# Patient Record
Sex: FEMALE | Race: WHITE | NOT HISPANIC OR LATINO
[De-identification: names, ages, dates, MRNs, and addresses within clinical notes are randomized per-mention and may not be internally consistent; named-entity substitution may affect disease eponyms.]

---

## 2017-03-23 ENCOUNTER — APPOINTMENT (OUTPATIENT)
Dept: UROLOGY | Facility: CLINIC | Age: 82
End: 2017-03-23

## 2017-06-01 ENCOUNTER — OUTPATIENT (OUTPATIENT)
Dept: OUTPATIENT SERVICES | Facility: HOSPITAL | Age: 82
LOS: 1 days | Discharge: HOME | End: 2017-06-01

## 2017-06-01 DIAGNOSIS — K92.2 GASTROINTESTINAL HEMORRHAGE, UNSPECIFIED: ICD-10-CM

## 2017-06-01 DIAGNOSIS — M15.9 POLYOSTEOARTHRITIS, UNSPECIFIED: ICD-10-CM

## 2017-06-01 DIAGNOSIS — E86.0 DEHYDRATION: ICD-10-CM

## 2017-06-01 DIAGNOSIS — R00.2 PALPITATIONS: ICD-10-CM

## 2017-06-01 DIAGNOSIS — K57.30 DIVERTICULOSIS OF LARGE INTESTINE WITHOUT PERFORATION OR ABSCESS WITHOUT BLEEDING: ICD-10-CM

## 2017-06-01 DIAGNOSIS — H81.10 BENIGN PAROXYSMAL VERTIGO, UNSPECIFIED EAR: ICD-10-CM

## 2017-06-28 DIAGNOSIS — L97.821 NON-PRESSURE CHRONIC ULCER OF OTHER PART OF LEFT LOWER LEG LIMITED TO BREAKDOWN OF SKIN: ICD-10-CM

## 2017-09-28 ENCOUNTER — OUTPATIENT (OUTPATIENT)
Dept: OUTPATIENT SERVICES | Facility: HOSPITAL | Age: 82
LOS: 1 days | Discharge: HOME | End: 2017-09-28

## 2017-09-28 DIAGNOSIS — E64.9 SEQUELAE OF UNSPECIFIED NUTRITIONAL DEFICIENCY: ICD-10-CM

## 2017-09-28 DIAGNOSIS — E03.9 HYPOTHYROIDISM, UNSPECIFIED: ICD-10-CM

## 2017-09-28 DIAGNOSIS — E86.0 DEHYDRATION: ICD-10-CM

## 2017-09-28 DIAGNOSIS — R00.2 PALPITATIONS: ICD-10-CM

## 2017-09-28 DIAGNOSIS — K92.2 GASTROINTESTINAL HEMORRHAGE, UNSPECIFIED: ICD-10-CM

## 2017-09-28 DIAGNOSIS — M15.9 POLYOSTEOARTHRITIS, UNSPECIFIED: ICD-10-CM

## 2017-09-28 DIAGNOSIS — K57.30 DIVERTICULOSIS OF LARGE INTESTINE WITHOUT PERFORATION OR ABSCESS WITHOUT BLEEDING: ICD-10-CM

## 2017-09-28 DIAGNOSIS — D50.9 IRON DEFICIENCY ANEMIA, UNSPECIFIED: ICD-10-CM

## 2017-09-28 DIAGNOSIS — H81.10 BENIGN PAROXYSMAL VERTIGO, UNSPECIFIED EAR: ICD-10-CM

## 2017-09-29 ENCOUNTER — INPATIENT (INPATIENT)
Facility: HOSPITAL | Age: 82
LOS: 2 days | Discharge: HOME | End: 2017-10-02
Attending: INTERNAL MEDICINE | Admitting: INTERNAL MEDICINE

## 2017-09-29 DIAGNOSIS — K92.2 GASTROINTESTINAL HEMORRHAGE, UNSPECIFIED: ICD-10-CM

## 2017-09-29 DIAGNOSIS — E86.0 DEHYDRATION: ICD-10-CM

## 2017-09-29 DIAGNOSIS — R00.2 PALPITATIONS: ICD-10-CM

## 2017-09-29 DIAGNOSIS — K57.30 DIVERTICULOSIS OF LARGE INTESTINE WITHOUT PERFORATION OR ABSCESS WITHOUT BLEEDING: ICD-10-CM

## 2017-09-29 DIAGNOSIS — H81.10 BENIGN PAROXYSMAL VERTIGO, UNSPECIFIED EAR: ICD-10-CM

## 2017-09-29 DIAGNOSIS — M15.9 POLYOSTEOARTHRITIS, UNSPECIFIED: ICD-10-CM

## 2017-10-02 DIAGNOSIS — Z02.9 ENCOUNTER FOR ADMINISTRATIVE EXAMINATIONS, UNSPECIFIED: ICD-10-CM

## 2017-10-05 DIAGNOSIS — N18.9 CHRONIC KIDNEY DISEASE, UNSPECIFIED: ICD-10-CM

## 2017-10-05 DIAGNOSIS — R53.1 WEAKNESS: ICD-10-CM

## 2017-10-05 DIAGNOSIS — Z85.3 PERSONAL HISTORY OF MALIGNANT NEOPLASM OF BREAST: ICD-10-CM

## 2017-10-05 DIAGNOSIS — N17.9 ACUTE KIDNEY FAILURE, UNSPECIFIED: ICD-10-CM

## 2017-10-05 DIAGNOSIS — I25.10 ATHEROSCLEROTIC HEART DISEASE OF NATIVE CORONARY ARTERY WITHOUT ANGINA PECTORIS: ICD-10-CM

## 2017-10-05 DIAGNOSIS — Z90.2 ACQUIRED ABSENCE OF LUNG [PART OF]: ICD-10-CM

## 2017-10-05 DIAGNOSIS — E87.5 HYPERKALEMIA: ICD-10-CM

## 2017-10-05 DIAGNOSIS — Z85.43 PERSONAL HISTORY OF MALIGNANT NEOPLASM OF OVARY: ICD-10-CM

## 2017-10-05 DIAGNOSIS — Z96.652 PRESENCE OF LEFT ARTIFICIAL KNEE JOINT: ICD-10-CM

## 2017-10-05 DIAGNOSIS — Z85.118 PERSONAL HISTORY OF OTHER MALIGNANT NEOPLASM OF BRONCHUS AND LUNG: ICD-10-CM

## 2017-10-05 DIAGNOSIS — Z96.641 PRESENCE OF RIGHT ARTIFICIAL HIP JOINT: ICD-10-CM

## 2017-10-05 DIAGNOSIS — Z85.528 PERSONAL HISTORY OF OTHER MALIGNANT NEOPLASM OF KIDNEY: ICD-10-CM

## 2017-10-05 DIAGNOSIS — Z90.5 ACQUIRED ABSENCE OF KIDNEY: ICD-10-CM

## 2017-10-05 DIAGNOSIS — Z87.891 PERSONAL HISTORY OF NICOTINE DEPENDENCE: ICD-10-CM

## 2017-10-05 DIAGNOSIS — J44.9 CHRONIC OBSTRUCTIVE PULMONARY DISEASE, UNSPECIFIED: ICD-10-CM

## 2017-10-05 DIAGNOSIS — E03.9 HYPOTHYROIDISM, UNSPECIFIED: ICD-10-CM

## 2017-10-05 DIAGNOSIS — R62.7 ADULT FAILURE TO THRIVE: ICD-10-CM

## 2017-10-05 DIAGNOSIS — E86.0 DEHYDRATION: ICD-10-CM

## 2017-10-23 ENCOUNTER — INPATIENT (INPATIENT)
Facility: HOSPITAL | Age: 82
LOS: 1 days | Discharge: HOME | End: 2017-10-25
Attending: HOSPITALIST | Admitting: INTERNAL MEDICINE

## 2017-10-23 DIAGNOSIS — R00.2 PALPITATIONS: ICD-10-CM

## 2017-10-23 DIAGNOSIS — K92.2 GASTROINTESTINAL HEMORRHAGE, UNSPECIFIED: ICD-10-CM

## 2017-10-23 DIAGNOSIS — M15.9 POLYOSTEOARTHRITIS, UNSPECIFIED: ICD-10-CM

## 2017-10-23 DIAGNOSIS — E86.0 DEHYDRATION: ICD-10-CM

## 2017-10-23 DIAGNOSIS — K57.30 DIVERTICULOSIS OF LARGE INTESTINE WITHOUT PERFORATION OR ABSCESS WITHOUT BLEEDING: ICD-10-CM

## 2017-10-23 DIAGNOSIS — H81.10 BENIGN PAROXYSMAL VERTIGO, UNSPECIFIED EAR: ICD-10-CM

## 2017-11-06 DIAGNOSIS — Z87.891 PERSONAL HISTORY OF NICOTINE DEPENDENCE: ICD-10-CM

## 2017-11-06 DIAGNOSIS — Z90.2 ACQUIRED ABSENCE OF LUNG [PART OF]: ICD-10-CM

## 2017-11-06 DIAGNOSIS — N17.9 ACUTE KIDNEY FAILURE, UNSPECIFIED: ICD-10-CM

## 2017-11-06 DIAGNOSIS — Z85.3 PERSONAL HISTORY OF MALIGNANT NEOPLASM OF BREAST: ICD-10-CM

## 2017-11-06 DIAGNOSIS — I25.10 ATHEROSCLEROTIC HEART DISEASE OF NATIVE CORONARY ARTERY WITHOUT ANGINA PECTORIS: ICD-10-CM

## 2017-11-06 DIAGNOSIS — Z96.652 PRESENCE OF LEFT ARTIFICIAL KNEE JOINT: ICD-10-CM

## 2017-11-06 DIAGNOSIS — Z90.5 ACQUIRED ABSENCE OF KIDNEY: ICD-10-CM

## 2017-11-06 DIAGNOSIS — Z96.641 PRESENCE OF RIGHT ARTIFICIAL HIP JOINT: ICD-10-CM

## 2017-11-06 DIAGNOSIS — I12.9 HYPERTENSIVE CHRONIC KIDNEY DISEASE WITH STAGE 1 THROUGH STAGE 4 CHRONIC KIDNEY DISEASE, OR UNSPECIFIED CHRONIC KIDNEY DISEASE: ICD-10-CM

## 2017-11-06 DIAGNOSIS — R00.2 PALPITATIONS: ICD-10-CM

## 2017-11-06 DIAGNOSIS — E03.9 HYPOTHYROIDISM, UNSPECIFIED: ICD-10-CM

## 2017-11-06 DIAGNOSIS — N18.9 CHRONIC KIDNEY DISEASE, UNSPECIFIED: ICD-10-CM

## 2017-11-06 DIAGNOSIS — J44.9 CHRONIC OBSTRUCTIVE PULMONARY DISEASE, UNSPECIFIED: ICD-10-CM

## 2017-11-06 DIAGNOSIS — T50.905A ADVERSE EFFECT OF UNSPECIFIED DRUGS, MEDICAMENTS AND BIOLOGICAL SUBSTANCES, INITIAL ENCOUNTER: ICD-10-CM

## 2017-11-06 DIAGNOSIS — Z85.528 PERSONAL HISTORY OF OTHER MALIGNANT NEOPLASM OF KIDNEY: ICD-10-CM

## 2017-11-06 DIAGNOSIS — Z85.118 PERSONAL HISTORY OF OTHER MALIGNANT NEOPLASM OF BRONCHUS AND LUNG: ICD-10-CM

## 2017-11-06 DIAGNOSIS — N39.0 URINARY TRACT INFECTION, SITE NOT SPECIFIED: ICD-10-CM

## 2017-11-06 DIAGNOSIS — E83.52 HYPERCALCEMIA: ICD-10-CM

## 2017-11-07 ENCOUNTER — OUTPATIENT (OUTPATIENT)
Dept: OUTPATIENT SERVICES | Facility: HOSPITAL | Age: 82
LOS: 1 days | Discharge: HOME | End: 2017-11-07

## 2017-11-07 DIAGNOSIS — K92.2 GASTROINTESTINAL HEMORRHAGE, UNSPECIFIED: ICD-10-CM

## 2017-11-07 DIAGNOSIS — R00.2 PALPITATIONS: ICD-10-CM

## 2017-11-07 DIAGNOSIS — R06.02 SHORTNESS OF BREATH: ICD-10-CM

## 2017-11-07 DIAGNOSIS — H81.10 BENIGN PAROXYSMAL VERTIGO, UNSPECIFIED EAR: ICD-10-CM

## 2017-11-07 DIAGNOSIS — M15.9 POLYOSTEOARTHRITIS, UNSPECIFIED: ICD-10-CM

## 2017-11-07 DIAGNOSIS — E86.0 DEHYDRATION: ICD-10-CM

## 2017-11-07 DIAGNOSIS — I10 ESSENTIAL (PRIMARY) HYPERTENSION: ICD-10-CM

## 2017-11-07 DIAGNOSIS — I35.0 NONRHEUMATIC AORTIC (VALVE) STENOSIS: ICD-10-CM

## 2017-11-07 DIAGNOSIS — K57.30 DIVERTICULOSIS OF LARGE INTESTINE WITHOUT PERFORATION OR ABSCESS WITHOUT BLEEDING: ICD-10-CM

## 2017-12-12 ENCOUNTER — EMERGENCY (EMERGENCY)
Facility: HOSPITAL | Age: 82
LOS: 0 days | Discharge: HOME | End: 2017-12-13
Admitting: INTERNAL MEDICINE

## 2017-12-12 ENCOUNTER — INPATIENT (INPATIENT)
Facility: HOSPITAL | Age: 82
LOS: 0 days | Discharge: HOME | End: 2017-12-13
Attending: INTERNAL MEDICINE | Admitting: INTERNAL MEDICINE

## 2017-12-12 DIAGNOSIS — E86.0 DEHYDRATION: ICD-10-CM

## 2017-12-12 DIAGNOSIS — I10 ESSENTIAL (PRIMARY) HYPERTENSION: ICD-10-CM

## 2017-12-12 DIAGNOSIS — R00.2 PALPITATIONS: ICD-10-CM

## 2017-12-12 DIAGNOSIS — M15.9 POLYOSTEOARTHRITIS, UNSPECIFIED: ICD-10-CM

## 2017-12-12 DIAGNOSIS — H81.10 BENIGN PAROXYSMAL VERTIGO, UNSPECIFIED EAR: ICD-10-CM

## 2017-12-12 DIAGNOSIS — R53.1 WEAKNESS: ICD-10-CM

## 2017-12-12 DIAGNOSIS — R11.0 NAUSEA: ICD-10-CM

## 2017-12-12 DIAGNOSIS — R42 DIZZINESS AND GIDDINESS: ICD-10-CM

## 2017-12-12 DIAGNOSIS — K92.2 GASTROINTESTINAL HEMORRHAGE, UNSPECIFIED: ICD-10-CM

## 2017-12-12 DIAGNOSIS — K57.30 DIVERTICULOSIS OF LARGE INTESTINE WITHOUT PERFORATION OR ABSCESS WITHOUT BLEEDING: ICD-10-CM

## 2017-12-12 DIAGNOSIS — R51 HEADACHE: ICD-10-CM

## 2017-12-27 DIAGNOSIS — Z85.528 PERSONAL HISTORY OF OTHER MALIGNANT NEOPLASM OF KIDNEY: ICD-10-CM

## 2017-12-27 DIAGNOSIS — I12.9 HYPERTENSIVE CHRONIC KIDNEY DISEASE WITH STAGE 1 THROUGH STAGE 4 CHRONIC KIDNEY DISEASE, OR UNSPECIFIED CHRONIC KIDNEY DISEASE: ICD-10-CM

## 2017-12-27 DIAGNOSIS — I25.10 ATHEROSCLEROTIC HEART DISEASE OF NATIVE CORONARY ARTERY WITHOUT ANGINA PECTORIS: ICD-10-CM

## 2017-12-27 DIAGNOSIS — Z90.5 ACQUIRED ABSENCE OF KIDNEY: ICD-10-CM

## 2017-12-27 DIAGNOSIS — N18.3 CHRONIC KIDNEY DISEASE, STAGE 3 (MODERATE): ICD-10-CM

## 2017-12-27 DIAGNOSIS — Z90.2 ACQUIRED ABSENCE OF LUNG [PART OF]: ICD-10-CM

## 2017-12-27 DIAGNOSIS — E87.1 HYPO-OSMOLALITY AND HYPONATREMIA: ICD-10-CM

## 2017-12-27 DIAGNOSIS — Z87.891 PERSONAL HISTORY OF NICOTINE DEPENDENCE: ICD-10-CM

## 2017-12-27 DIAGNOSIS — Z96.641 PRESENCE OF RIGHT ARTIFICIAL HIP JOINT: ICD-10-CM

## 2017-12-27 DIAGNOSIS — Z79.52 LONG TERM (CURRENT) USE OF SYSTEMIC STEROIDS: ICD-10-CM

## 2017-12-27 DIAGNOSIS — Z85.3 PERSONAL HISTORY OF MALIGNANT NEOPLASM OF BREAST: ICD-10-CM

## 2017-12-27 DIAGNOSIS — Z96.652 PRESENCE OF LEFT ARTIFICIAL KNEE JOINT: ICD-10-CM

## 2017-12-27 DIAGNOSIS — Z90.49 ACQUIRED ABSENCE OF OTHER SPECIFIED PARTS OF DIGESTIVE TRACT: ICD-10-CM

## 2017-12-27 DIAGNOSIS — J44.9 CHRONIC OBSTRUCTIVE PULMONARY DISEASE, UNSPECIFIED: ICD-10-CM

## 2017-12-27 DIAGNOSIS — H81.10 BENIGN PAROXYSMAL VERTIGO, UNSPECIFIED EAR: ICD-10-CM

## 2017-12-27 DIAGNOSIS — Z85.118 PERSONAL HISTORY OF OTHER MALIGNANT NEOPLASM OF BRONCHUS AND LUNG: ICD-10-CM

## 2017-12-27 DIAGNOSIS — R42 DIZZINESS AND GIDDINESS: ICD-10-CM

## 2017-12-27 DIAGNOSIS — E03.9 HYPOTHYROIDISM, UNSPECIFIED: ICD-10-CM

## 2017-12-27 DIAGNOSIS — Z91.041 RADIOGRAPHIC DYE ALLERGY STATUS: ICD-10-CM

## 2018-02-01 ENCOUNTER — APPOINTMENT (OUTPATIENT)
Dept: UROLOGY | Facility: CLINIC | Age: 83
End: 2018-02-01

## 2018-02-13 ENCOUNTER — OUTPATIENT (OUTPATIENT)
Dept: OUTPATIENT SERVICES | Facility: HOSPITAL | Age: 83
LOS: 1 days | Discharge: HOME | End: 2018-02-13

## 2018-02-13 DIAGNOSIS — I10 ESSENTIAL (PRIMARY) HYPERTENSION: ICD-10-CM

## 2018-02-13 DIAGNOSIS — I34.0 NONRHEUMATIC MITRAL (VALVE) INSUFFICIENCY: ICD-10-CM

## 2018-02-13 DIAGNOSIS — I51.7 CARDIOMEGALY: ICD-10-CM

## 2018-02-13 DIAGNOSIS — I35.2 NONRHEUMATIC AORTIC (VALVE) STENOSIS WITH INSUFFICIENCY: ICD-10-CM

## 2018-03-22 ENCOUNTER — APPOINTMENT (OUTPATIENT)
Dept: UROLOGY | Facility: CLINIC | Age: 83
End: 2018-03-22

## 2018-03-27 ENCOUNTER — OUTPATIENT (OUTPATIENT)
Dept: OUTPATIENT SERVICES | Facility: HOSPITAL | Age: 83
LOS: 1 days | Discharge: HOME | End: 2018-03-27

## 2018-03-27 DIAGNOSIS — R07.89 OTHER CHEST PAIN: ICD-10-CM

## 2018-03-27 DIAGNOSIS — R06.02 SHORTNESS OF BREATH: ICD-10-CM

## 2018-03-27 DIAGNOSIS — I73.9 PERIPHERAL VASCULAR DISEASE, UNSPECIFIED: ICD-10-CM

## 2018-03-27 DIAGNOSIS — I10 ESSENTIAL (PRIMARY) HYPERTENSION: ICD-10-CM

## 2018-03-27 DIAGNOSIS — R42 DIZZINESS AND GIDDINESS: ICD-10-CM

## 2018-03-27 DIAGNOSIS — I51.7 CARDIOMEGALY: ICD-10-CM

## 2018-03-27 DIAGNOSIS — I35.0 NONRHEUMATIC AORTIC (VALVE) STENOSIS: ICD-10-CM

## 2018-03-27 DIAGNOSIS — I36.1 NONRHEUMATIC TRICUSPID (VALVE) INSUFFICIENCY: ICD-10-CM

## 2018-03-27 DIAGNOSIS — I35.2 NONRHEUMATIC AORTIC (VALVE) STENOSIS WITH INSUFFICIENCY: ICD-10-CM

## 2018-03-27 DIAGNOSIS — R00.2 PALPITATIONS: ICD-10-CM

## 2018-04-26 ENCOUNTER — APPOINTMENT (OUTPATIENT)
Dept: UROLOGY | Facility: CLINIC | Age: 83
End: 2018-04-26
Payer: MEDICARE

## 2018-04-26 DIAGNOSIS — R35.0 FREQUENCY OF MICTURITION: ICD-10-CM

## 2018-04-26 PROCEDURE — 99213 OFFICE O/P EST LOW 20 MIN: CPT

## 2018-05-15 ENCOUNTER — OUTPATIENT (OUTPATIENT)
Dept: OUTPATIENT SERVICES | Facility: HOSPITAL | Age: 83
LOS: 1 days | Discharge: HOME | End: 2018-05-15

## 2018-05-15 DIAGNOSIS — I10 ESSENTIAL (PRIMARY) HYPERTENSION: ICD-10-CM

## 2018-05-15 DIAGNOSIS — R00.2 PALPITATIONS: ICD-10-CM

## 2018-05-15 DIAGNOSIS — I35.0 NONRHEUMATIC AORTIC (VALVE) STENOSIS: ICD-10-CM

## 2018-05-15 DIAGNOSIS — R42 DIZZINESS AND GIDDINESS: ICD-10-CM

## 2018-06-07 ENCOUNTER — APPOINTMENT (OUTPATIENT)
Dept: UROLOGY | Facility: CLINIC | Age: 83
End: 2018-06-07
Payer: MEDICARE

## 2018-06-07 VITALS
SYSTOLIC BLOOD PRESSURE: 194 MMHG | WEIGHT: 142 LBS | DIASTOLIC BLOOD PRESSURE: 80 MMHG | HEIGHT: 64 IN | BODY MASS INDEX: 24.24 KG/M2 | HEART RATE: 64 BPM

## 2018-06-07 LAB
BILIRUB UR QL STRIP: NORMAL
CLARITY UR: CLEAR
COLLECTION METHOD: NORMAL
GLUCOSE UR-MCNC: NORMAL
HCG UR QL: NORMAL EU/DL
HGB UR QL STRIP.AUTO: NORMAL
KETONES UR-MCNC: NORMAL
LEUKOCYTE ESTERASE UR QL STRIP: NORMAL
NITRITE UR QL STRIP: NORMAL
PH UR STRIP: 5
PROT UR STRIP-MCNC: NORMAL
SP GR UR STRIP: 1.02

## 2018-06-07 PROCEDURE — 81003 URINALYSIS AUTO W/O SCOPE: CPT | Mod: QW

## 2018-06-07 PROCEDURE — 52000 CYSTOURETHROSCOPY: CPT

## 2018-06-07 RX ORDER — MIRABEGRON 50 MG/1
50 TABLET, FILM COATED, EXTENDED RELEASE ORAL
Qty: 30 | Refills: 6 | Status: ACTIVE | COMMUNITY
Start: 2018-06-07 | End: 1900-01-01

## 2018-06-07 RX ORDER — AMOXICILLIN AND CLAVULANATE POTASSIUM 875; 125 MG/1; MG/1
875-125 TABLET, COATED ORAL
Qty: 6 | Refills: 0 | Status: ACTIVE | COMMUNITY
Start: 2018-06-07 | End: 1900-01-01

## 2018-09-05 ENCOUNTER — APPOINTMENT (OUTPATIENT)
Dept: UROLOGY | Facility: HOSPITAL | Age: 83
End: 2018-09-05

## 2018-09-26 ENCOUNTER — FORM ENCOUNTER (OUTPATIENT)
Age: 83
End: 2018-09-26

## 2018-09-27 ENCOUNTER — OUTPATIENT (OUTPATIENT)
Dept: OUTPATIENT SERVICES | Facility: HOSPITAL | Age: 83
LOS: 1 days | Discharge: HOME | End: 2018-09-27

## 2018-09-27 VITALS
DIASTOLIC BLOOD PRESSURE: 56 MMHG | HEIGHT: 64 IN | HEART RATE: 69 BPM | RESPIRATION RATE: 18 BRPM | TEMPERATURE: 98 F | OXYGEN SATURATION: 100 % | SYSTOLIC BLOOD PRESSURE: 119 MMHG | WEIGHT: 138.89 LBS

## 2018-09-27 DIAGNOSIS — Z01.818 ENCOUNTER FOR OTHER PREPROCEDURAL EXAMINATION: ICD-10-CM

## 2018-09-27 DIAGNOSIS — C67.9 MALIGNANT NEOPLASM OF BLADDER, UNSPECIFIED: ICD-10-CM

## 2018-09-27 DIAGNOSIS — Z96.652 PRESENCE OF LEFT ARTIFICIAL KNEE JOINT: Chronic | ICD-10-CM

## 2018-09-27 DIAGNOSIS — Z98.890 OTHER SPECIFIED POSTPROCEDURAL STATES: Chronic | ICD-10-CM

## 2018-09-27 DIAGNOSIS — Z96.641 PRESENCE OF RIGHT ARTIFICIAL HIP JOINT: Chronic | ICD-10-CM

## 2018-09-27 DIAGNOSIS — C67.9 MALIGNANT NEOPLASM OF BLADDER, UNSPECIFIED: Chronic | ICD-10-CM

## 2018-09-27 DIAGNOSIS — Z90.5 ACQUIRED ABSENCE OF KIDNEY: Chronic | ICD-10-CM

## 2018-09-27 LAB
ALBUMIN SERPL ELPH-MCNC: 4.6 G/DL — SIGNIFICANT CHANGE UP (ref 3.5–5.2)
ALP SERPL-CCNC: 110 U/L — SIGNIFICANT CHANGE UP (ref 30–115)
ALT FLD-CCNC: 7 U/L — SIGNIFICANT CHANGE UP (ref 0–41)
ANION GAP SERPL CALC-SCNC: 14 MMOL/L — SIGNIFICANT CHANGE UP (ref 7–14)
APPEARANCE UR: CLEAR — SIGNIFICANT CHANGE UP
APTT BLD: 36.5 SEC — SIGNIFICANT CHANGE UP (ref 27–39.2)
AST SERPL-CCNC: 12 U/L — SIGNIFICANT CHANGE UP (ref 0–41)
BACTERIA # UR AUTO: SIGNIFICANT CHANGE UP /HPF
BASOPHILS # BLD AUTO: 0.05 K/UL — SIGNIFICANT CHANGE UP (ref 0–0.2)
BASOPHILS NFR BLD AUTO: 0.8 % — SIGNIFICANT CHANGE UP (ref 0–1)
BILIRUB SERPL-MCNC: 0.2 MG/DL — SIGNIFICANT CHANGE UP (ref 0.2–1.2)
BILIRUB UR-MCNC: NEGATIVE — SIGNIFICANT CHANGE UP
BUN SERPL-MCNC: 52 MG/DL — HIGH (ref 10–20)
CALCIUM SERPL-MCNC: 10 MG/DL — SIGNIFICANT CHANGE UP (ref 8.5–10.1)
CHLORIDE SERPL-SCNC: 99 MMOL/L — SIGNIFICANT CHANGE UP (ref 98–110)
CO2 SERPL-SCNC: 25 MMOL/L — SIGNIFICANT CHANGE UP (ref 17–32)
COLOR SPEC: YELLOW — SIGNIFICANT CHANGE UP
CREAT SERPL-MCNC: 1.9 MG/DL — HIGH (ref 0.7–1.5)
DIFF PNL FLD: NEGATIVE — SIGNIFICANT CHANGE UP
EOSINOPHIL # BLD AUTO: 0.13 K/UL — SIGNIFICANT CHANGE UP (ref 0–0.7)
EOSINOPHIL NFR BLD AUTO: 2 % — SIGNIFICANT CHANGE UP (ref 0–8)
EPI CELLS # UR: ABNORMAL /HPF
GLUCOSE SERPL-MCNC: 113 MG/DL — HIGH (ref 70–99)
GLUCOSE UR QL: NEGATIVE MG/DL — SIGNIFICANT CHANGE UP
HCT VFR BLD CALC: 26.3 % — LOW (ref 37–47)
HGB BLD-MCNC: 8.9 G/DL — LOW (ref 12–16)
IMM GRANULOCYTES NFR BLD AUTO: 0.6 % — HIGH (ref 0.1–0.3)
INR BLD: 1.02 RATIO — SIGNIFICANT CHANGE UP (ref 0.65–1.3)
KETONES UR-MCNC: NEGATIVE — SIGNIFICANT CHANGE UP
LEUKOCYTE ESTERASE UR-ACNC: ABNORMAL
LYMPHOCYTES # BLD AUTO: 1.53 K/UL — SIGNIFICANT CHANGE UP (ref 1.2–3.4)
LYMPHOCYTES # BLD AUTO: 24 % — SIGNIFICANT CHANGE UP (ref 20.5–51.1)
MCHC RBC-ENTMCNC: 31.3 PG — HIGH (ref 27–31)
MCHC RBC-ENTMCNC: 33.8 G/DL — SIGNIFICANT CHANGE UP (ref 32–37)
MCV RBC AUTO: 92.6 FL — SIGNIFICANT CHANGE UP (ref 81–99)
MONOCYTES # BLD AUTO: 0.68 K/UL — HIGH (ref 0.1–0.6)
MONOCYTES NFR BLD AUTO: 10.7 % — HIGH (ref 1.7–9.3)
NEUTROPHILS # BLD AUTO: 3.94 K/UL — SIGNIFICANT CHANGE UP (ref 1.4–6.5)
NEUTROPHILS NFR BLD AUTO: 61.9 % — SIGNIFICANT CHANGE UP (ref 42.2–75.2)
NITRITE UR-MCNC: NEGATIVE — SIGNIFICANT CHANGE UP
NRBC # BLD: 0 /100 WBCS — SIGNIFICANT CHANGE UP (ref 0–0)
PH UR: 6 — SIGNIFICANT CHANGE UP (ref 5–8)
PLATELET # BLD AUTO: 222 K/UL — SIGNIFICANT CHANGE UP (ref 130–400)
POTASSIUM SERPL-MCNC: 4.9 MMOL/L — SIGNIFICANT CHANGE UP (ref 3.5–5)
POTASSIUM SERPL-SCNC: 4.9 MMOL/L — SIGNIFICANT CHANGE UP (ref 3.5–5)
PROT SERPL-MCNC: 7.2 G/DL — SIGNIFICANT CHANGE UP (ref 6–8)
PROT UR-MCNC: 100 MG/DL
PROTHROM AB SERPL-ACNC: 11 SEC — SIGNIFICANT CHANGE UP (ref 9.95–12.87)
RBC # BLD: 2.84 M/UL — LOW (ref 4.2–5.4)
RBC # FLD: 16.5 % — HIGH (ref 11.5–14.5)
SODIUM SERPL-SCNC: 138 MMOL/L — SIGNIFICANT CHANGE UP (ref 135–146)
SP GR SPEC: 1.01 — SIGNIFICANT CHANGE UP (ref 1.01–1.03)
UROBILINOGEN FLD QL: 0.2 MG/DL — SIGNIFICANT CHANGE UP (ref 0.2–0.2)
WBC # BLD: 6.37 K/UL — SIGNIFICANT CHANGE UP (ref 4.8–10.8)
WBC # FLD AUTO: 6.37 K/UL — SIGNIFICANT CHANGE UP (ref 4.8–10.8)
WBC UR QL: ABNORMAL /HPF

## 2018-09-27 NOTE — H&P PST ADULT - NSANTHOSAYNRD_GEN_A_CORE
No. JEIMY screening performed.  STOP BANG Legend: 0-2 = LOW Risk; 3-4 = INTERMEDIATE Risk; 5-8 = HIGH Risk

## 2018-09-27 NOTE — H&P PST ADULT - REASON FOR ADMISSION
89 yo f presents to PAST for cystoscopy, bladder biopsy fulguration, possible transurethral resection of bladder tumor under GA by Dr. Russ

## 2018-09-27 NOTE — H&P PST ADULT - HISTORY OF PRESENT ILLNESS
Patient with h/o bladder cancer since 1981and removed x few times at [facility]. Recntly changed urologist and found a regrow of bladder tumor and scheduled for the above procedure. Patient denies any c/o cp, palpitations fever, uti or Dysuria. Patient c/o Sob and SNYDER.   No JEIMY. Patient with h/o recurrent bladder cancer since 1981and removed few times at [facility]. Recently changed urologist and found a regrow of bladder tumor and scheduled for the above procedure. Patient denies any c/o cp, palpitations fever, uti or Dysuria. Patient c/o Sob and SNYDER. Ambulate with cane.   No JEIMY.

## 2018-09-27 NOTE — H&P PST ADULT - PSH
H/O breast surgery  25% calcification removed  History of hip replacement, total, right    History of nephrectomy  Left  History of total left knee replacement (TKR)    Malignant neoplasm of urinary bladder, unspecified site  Removal x 3

## 2018-09-27 NOTE — H&P PST ADULT - PMH
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

## 2018-09-29 LAB
CULTURE RESULTS: SIGNIFICANT CHANGE UP
SPECIMEN SOURCE: SIGNIFICANT CHANGE UP

## 2018-10-10 ENCOUNTER — APPOINTMENT (OUTPATIENT)
Dept: UROLOGY | Facility: HOSPITAL | Age: 83
End: 2018-10-10
Payer: MEDICARE

## 2018-10-10 ENCOUNTER — RESULT REVIEW (OUTPATIENT)
Age: 83
End: 2018-10-10

## 2018-10-10 ENCOUNTER — OUTPATIENT (OUTPATIENT)
Dept: OUTPATIENT SERVICES | Facility: HOSPITAL | Age: 83
LOS: 1 days | Discharge: HOME | End: 2018-10-10

## 2018-10-10 VITALS
DIASTOLIC BLOOD PRESSURE: 88 MMHG | RESPIRATION RATE: 16 BRPM | HEART RATE: 57 BPM | TEMPERATURE: 98 F | WEIGHT: 139.99 LBS | SYSTOLIC BLOOD PRESSURE: 172 MMHG | HEIGHT: 64 IN

## 2018-10-10 VITALS — SYSTOLIC BLOOD PRESSURE: 141 MMHG | HEART RATE: 51 BPM | RESPIRATION RATE: 20 BRPM | DIASTOLIC BLOOD PRESSURE: 74 MMHG

## 2018-10-10 DIAGNOSIS — C67.9 MALIGNANT NEOPLASM OF BLADDER, UNSPECIFIED: Chronic | ICD-10-CM

## 2018-10-10 DIAGNOSIS — Z98.890 OTHER SPECIFIED POSTPROCEDURAL STATES: Chronic | ICD-10-CM

## 2018-10-10 DIAGNOSIS — Z96.652 PRESENCE OF LEFT ARTIFICIAL KNEE JOINT: Chronic | ICD-10-CM

## 2018-10-10 DIAGNOSIS — Z96.641 PRESENCE OF RIGHT ARTIFICIAL HIP JOINT: Chronic | ICD-10-CM

## 2018-10-10 DIAGNOSIS — Z90.5 ACQUIRED ABSENCE OF KIDNEY: Chronic | ICD-10-CM

## 2018-10-10 PROCEDURE — 52224 CYSTOSCOPY AND TREATMENT: CPT | Mod: 59

## 2018-10-10 PROCEDURE — 52234 CYSTOSCOPY AND TREATMENT: CPT

## 2018-10-10 RX ORDER — MEPERIDINE HYDROCHLORIDE 50 MG/ML
12.5 INJECTION INTRAMUSCULAR; INTRAVENOUS; SUBCUTANEOUS
Qty: 0 | Refills: 0 | Status: DISCONTINUED | OUTPATIENT
Start: 2018-10-10 | End: 2018-10-10

## 2018-10-10 RX ORDER — SODIUM CHLORIDE 9 MG/ML
1000 INJECTION, SOLUTION INTRAVENOUS
Qty: 0 | Refills: 0 | Status: DISCONTINUED | OUTPATIENT
Start: 2018-10-10 | End: 2018-10-10

## 2018-10-10 RX ORDER — ONDANSETRON 8 MG/1
4 TABLET, FILM COATED ORAL ONCE
Qty: 0 | Refills: 0 | Status: DISCONTINUED | OUTPATIENT
Start: 2018-10-10 | End: 2018-10-10

## 2018-10-10 RX ORDER — MORPHINE SULFATE 50 MG/1
2 CAPSULE, EXTENDED RELEASE ORAL
Qty: 0 | Refills: 0 | Status: DISCONTINUED | OUTPATIENT
Start: 2018-10-10 | End: 2018-10-10

## 2018-10-10 RX ORDER — ACETAMINOPHEN 500 MG
650 TABLET ORAL ONCE
Qty: 0 | Refills: 0 | Status: DISCONTINUED | OUTPATIENT
Start: 2018-10-10 | End: 2018-10-25

## 2018-10-10 RX ORDER — MORPHINE SULFATE 50 MG/1
4 CAPSULE, EXTENDED RELEASE ORAL
Qty: 0 | Refills: 0 | Status: DISCONTINUED | OUTPATIENT
Start: 2018-10-10 | End: 2018-10-10

## 2018-10-10 RX ORDER — DEXAMETHASONE 0.5 MG/5ML
8 ELIXIR ORAL ONCE
Qty: 0 | Refills: 0 | Status: DISCONTINUED | OUTPATIENT
Start: 2018-10-10 | End: 2018-10-10

## 2018-10-10 RX ADMIN — SODIUM CHLORIDE 125 MILLILITER(S): 9 INJECTION, SOLUTION INTRAVENOUS at 12:40

## 2018-10-10 NOTE — ASU PATIENT PROFILE, ADULT - PROVIDER NOTIFICATION
Pt given urine speciman cup, mariusz soap towelettewipe, and instructions for [facility]; understanding verbalized     Declines

## 2018-10-10 NOTE — BRIEF OPERATIVE NOTE - PROCEDURE
<<-----Click on this checkbox to enter Procedure JERRIT, using monopolar cautery probe  10/10/2018    Active  SILVIA

## 2018-10-11 LAB — SURGICAL PATHOLOGY STUDY: SIGNIFICANT CHANGE UP

## 2018-10-15 DIAGNOSIS — J44.9 CHRONIC OBSTRUCTIVE PULMONARY DISEASE, UNSPECIFIED: ICD-10-CM

## 2018-10-15 DIAGNOSIS — C67.9 MALIGNANT NEOPLASM OF BLADDER, UNSPECIFIED: ICD-10-CM

## 2018-10-15 DIAGNOSIS — D49.4 NEOPLASM OF UNSPECIFIED BEHAVIOR OF BLADDER: ICD-10-CM

## 2018-10-15 DIAGNOSIS — I10 ESSENTIAL (PRIMARY) HYPERTENSION: ICD-10-CM

## 2019-07-20 ENCOUNTER — INPATIENT (INPATIENT)
Facility: HOSPITAL | Age: 84
LOS: 4 days | Discharge: HOME | End: 2019-07-25
Attending: INTERNAL MEDICINE | Admitting: INTERNAL MEDICINE
Payer: MEDICARE

## 2019-07-20 VITALS
SYSTOLIC BLOOD PRESSURE: 148 MMHG | HEART RATE: 78 BPM | TEMPERATURE: 98 F | DIASTOLIC BLOOD PRESSURE: 68 MMHG | RESPIRATION RATE: 18 BRPM | OXYGEN SATURATION: 96 % | WEIGHT: 139.99 LBS

## 2019-07-20 DIAGNOSIS — Z96.641 PRESENCE OF RIGHT ARTIFICIAL HIP JOINT: Chronic | ICD-10-CM

## 2019-07-20 DIAGNOSIS — Z96.652 PRESENCE OF LEFT ARTIFICIAL KNEE JOINT: Chronic | ICD-10-CM

## 2019-07-20 DIAGNOSIS — C67.9 MALIGNANT NEOPLASM OF BLADDER, UNSPECIFIED: Chronic | ICD-10-CM

## 2019-07-20 DIAGNOSIS — Z90.5 ACQUIRED ABSENCE OF KIDNEY: Chronic | ICD-10-CM

## 2019-07-20 DIAGNOSIS — Z98.890 OTHER SPECIFIED POSTPROCEDURAL STATES: Chronic | ICD-10-CM

## 2019-07-20 PROCEDURE — 99285 EMERGENCY DEPT VISIT HI MDM: CPT

## 2019-07-21 PROBLEM — I10 ESSENTIAL (PRIMARY) HYPERTENSION: Chronic | Status: ACTIVE | Noted: 2018-09-27

## 2019-07-21 LAB
ALBUMIN SERPL ELPH-MCNC: 4.1 G/DL — SIGNIFICANT CHANGE UP (ref 3.5–5.2)
ALP SERPL-CCNC: 150 U/L — HIGH (ref 30–115)
ALT FLD-CCNC: 8 U/L — SIGNIFICANT CHANGE UP (ref 0–41)
ANION GAP SERPL CALC-SCNC: 15 MMOL/L — HIGH (ref 7–14)
ANION GAP SERPL CALC-SCNC: 16 MMOL/L — HIGH (ref 7–14)
ANION GAP SERPL CALC-SCNC: 16 MMOL/L — HIGH (ref 7–14)
APTT BLD: 30.9 SEC — SIGNIFICANT CHANGE UP (ref 27–39.2)
AST SERPL-CCNC: 14 U/L — SIGNIFICANT CHANGE UP (ref 0–41)
BASOPHILS # BLD AUTO: 0.01 K/UL — SIGNIFICANT CHANGE UP (ref 0–0.2)
BASOPHILS NFR BLD AUTO: 0.1 % — SIGNIFICANT CHANGE UP (ref 0–1)
BILIRUB SERPL-MCNC: <0.2 MG/DL — SIGNIFICANT CHANGE UP (ref 0.2–1.2)
BUN SERPL-MCNC: 54 MG/DL — HIGH (ref 10–20)
BUN SERPL-MCNC: 56 MG/DL — HIGH (ref 10–20)
BUN SERPL-MCNC: 56 MG/DL — HIGH (ref 10–20)
C DIFF BY PCR RESULT: POSITIVE
C DIFF TOX GENS STL QL NAA+PROBE: SIGNIFICANT CHANGE UP
CALCIUM SERPL-MCNC: 8.8 MG/DL — SIGNIFICANT CHANGE UP (ref 8.5–10.1)
CALCIUM SERPL-MCNC: 9 MG/DL — SIGNIFICANT CHANGE UP (ref 8.5–10.1)
CALCIUM SERPL-MCNC: 9.6 MG/DL — SIGNIFICANT CHANGE UP (ref 8.5–10.1)
CHLORIDE SERPL-SCNC: 102 MMOL/L — SIGNIFICANT CHANGE UP (ref 98–110)
CHLORIDE SERPL-SCNC: 103 MMOL/L — SIGNIFICANT CHANGE UP (ref 98–110)
CHLORIDE SERPL-SCNC: 104 MMOL/L — SIGNIFICANT CHANGE UP (ref 98–110)
CO2 SERPL-SCNC: 16 MMOL/L — LOW (ref 17–32)
CO2 SERPL-SCNC: 17 MMOL/L — SIGNIFICANT CHANGE UP (ref 17–32)
CO2 SERPL-SCNC: 18 MMOL/L — SIGNIFICANT CHANGE UP (ref 17–32)
CREAT SERPL-MCNC: 2.4 MG/DL — HIGH (ref 0.7–1.5)
CREAT SERPL-MCNC: 2.7 MG/DL — HIGH (ref 0.7–1.5)
CREAT SERPL-MCNC: 2.9 MG/DL — HIGH (ref 0.7–1.5)
EOSINOPHIL # BLD AUTO: 0.01 K/UL — SIGNIFICANT CHANGE UP (ref 0–0.7)
EOSINOPHIL NFR BLD AUTO: 0.1 % — SIGNIFICANT CHANGE UP (ref 0–8)
GLUCOSE BLDC GLUCOMTR-MCNC: 99 MG/DL — SIGNIFICANT CHANGE UP (ref 70–99)
GLUCOSE SERPL-MCNC: 215 MG/DL — HIGH (ref 70–99)
GLUCOSE SERPL-MCNC: 228 MG/DL — HIGH (ref 70–99)
GLUCOSE SERPL-MCNC: 95 MG/DL — SIGNIFICANT CHANGE UP (ref 70–99)
HCT VFR BLD CALC: 27.7 % — LOW (ref 37–47)
HGB BLD-MCNC: 9.2 G/DL — LOW (ref 12–16)
IMM GRANULOCYTES NFR BLD AUTO: 0.4 % — HIGH (ref 0.1–0.3)
INR BLD: 1.04 RATIO — SIGNIFICANT CHANGE UP (ref 0.65–1.3)
LACTATE SERPL-SCNC: 1.8 MMOL/L — SIGNIFICANT CHANGE UP (ref 0.5–2.2)
LYMPHOCYTES # BLD AUTO: 0.25 K/UL — LOW (ref 1.2–3.4)
LYMPHOCYTES # BLD AUTO: 2.4 % — LOW (ref 20.5–51.1)
MCHC RBC-ENTMCNC: 30.9 PG — SIGNIFICANT CHANGE UP (ref 27–31)
MCHC RBC-ENTMCNC: 33.2 G/DL — SIGNIFICANT CHANGE UP (ref 32–37)
MCV RBC AUTO: 93 FL — SIGNIFICANT CHANGE UP (ref 81–99)
MONOCYTES # BLD AUTO: 0.8 K/UL — HIGH (ref 0.1–0.6)
MONOCYTES NFR BLD AUTO: 7.6 % — SIGNIFICANT CHANGE UP (ref 1.7–9.3)
NEUTROPHILS # BLD AUTO: 9.43 K/UL — HIGH (ref 1.4–6.5)
NEUTROPHILS NFR BLD AUTO: 89.4 % — HIGH (ref 42.2–75.2)
NRBC # BLD: 0 /100 WBCS — SIGNIFICANT CHANGE UP (ref 0–0)
PLATELET # BLD AUTO: 194 K/UL — SIGNIFICANT CHANGE UP (ref 130–400)
POTASSIUM SERPL-MCNC: 4.8 MMOL/L — SIGNIFICANT CHANGE UP (ref 3.5–5)
POTASSIUM SERPL-MCNC: 4.9 MMOL/L — SIGNIFICANT CHANGE UP (ref 3.5–5)
POTASSIUM SERPL-MCNC: 5.2 MMOL/L — HIGH (ref 3.5–5)
POTASSIUM SERPL-SCNC: 4.8 MMOL/L — SIGNIFICANT CHANGE UP (ref 3.5–5)
POTASSIUM SERPL-SCNC: 4.9 MMOL/L — SIGNIFICANT CHANGE UP (ref 3.5–5)
POTASSIUM SERPL-SCNC: 5.2 MMOL/L — HIGH (ref 3.5–5)
PROT SERPL-MCNC: 7 G/DL — SIGNIFICANT CHANGE UP (ref 6–8)
PROTHROM AB SERPL-ACNC: 12 SEC — SIGNIFICANT CHANGE UP (ref 9.95–12.87)
RBC # BLD: 2.98 M/UL — LOW (ref 4.2–5.4)
RBC # FLD: 16.8 % — HIGH (ref 11.5–14.5)
SODIUM SERPL-SCNC: 135 MMOL/L — SIGNIFICANT CHANGE UP (ref 135–146)
SODIUM SERPL-SCNC: 136 MMOL/L — SIGNIFICANT CHANGE UP (ref 135–146)
SODIUM SERPL-SCNC: 136 MMOL/L — SIGNIFICANT CHANGE UP (ref 135–146)
WBC # BLD: 10.54 K/UL — SIGNIFICANT CHANGE UP (ref 4.8–10.8)
WBC # FLD AUTO: 10.54 K/UL — SIGNIFICANT CHANGE UP (ref 4.8–10.8)

## 2019-07-21 PROCEDURE — 74176 CT ABD & PELVIS W/O CONTRAST: CPT | Mod: 26

## 2019-07-21 PROCEDURE — 71045 X-RAY EXAM CHEST 1 VIEW: CPT | Mod: 26

## 2019-07-21 RX ORDER — MORPHINE SULFATE 50 MG/1
2 CAPSULE, EXTENDED RELEASE ORAL ONCE
Refills: 0 | Status: DISCONTINUED | OUTPATIENT
Start: 2019-07-21 | End: 2019-07-21

## 2019-07-21 RX ORDER — CIPROFLOXACIN LACTATE 400MG/40ML
200 VIAL (ML) INTRAVENOUS EVERY 24 HOURS
Refills: 0 | Status: DISCONTINUED | OUTPATIENT
Start: 2019-07-21 | End: 2019-07-21

## 2019-07-21 RX ORDER — LEVOTHYROXINE SODIUM 125 MCG
75 TABLET ORAL DAILY
Refills: 0 | Status: DISCONTINUED | OUTPATIENT
Start: 2019-07-21 | End: 2019-07-25

## 2019-07-21 RX ORDER — ONDANSETRON 8 MG/1
4 TABLET, FILM COATED ORAL ONCE
Refills: 0 | Status: COMPLETED | OUTPATIENT
Start: 2019-07-21 | End: 2019-07-21

## 2019-07-21 RX ORDER — DULOXETINE HYDROCHLORIDE 30 MG/1
60 CAPSULE, DELAYED RELEASE ORAL DAILY
Refills: 0 | Status: DISCONTINUED | OUTPATIENT
Start: 2019-07-21 | End: 2019-07-25

## 2019-07-21 RX ORDER — METOPROLOL TARTRATE 50 MG
50 TABLET ORAL ONCE
Refills: 0 | Status: COMPLETED | OUTPATIENT
Start: 2019-07-21 | End: 2019-07-21

## 2019-07-21 RX ORDER — SODIUM CHLORIDE 9 MG/ML
1000 INJECTION, SOLUTION INTRAVENOUS ONCE
Refills: 0 | Status: COMPLETED | OUTPATIENT
Start: 2019-07-21 | End: 2019-07-21

## 2019-07-21 RX ORDER — METRONIDAZOLE 500 MG
500 TABLET ORAL ONCE
Refills: 0 | Status: COMPLETED | OUTPATIENT
Start: 2019-07-21 | End: 2019-07-21

## 2019-07-21 RX ORDER — PANTOPRAZOLE SODIUM 20 MG/1
40 TABLET, DELAYED RELEASE ORAL
Refills: 0 | Status: DISCONTINUED | OUTPATIENT
Start: 2019-07-21 | End: 2019-07-25

## 2019-07-21 RX ORDER — VANCOMYCIN HCL 1 G
125 VIAL (EA) INTRAVENOUS EVERY 6 HOURS
Refills: 0 | Status: DISCONTINUED | OUTPATIENT
Start: 2019-07-21 | End: 2019-07-25

## 2019-07-21 RX ORDER — IOHEXOL 300 MG/ML
30 INJECTION, SOLUTION INTRAVENOUS ONCE
Refills: 0 | Status: COMPLETED | OUTPATIENT
Start: 2019-07-21 | End: 2019-07-21

## 2019-07-21 RX ORDER — HEPARIN SODIUM 5000 [USP'U]/ML
5000 INJECTION INTRAVENOUS; SUBCUTANEOUS EVERY 8 HOURS
Refills: 0 | Status: DISCONTINUED | OUTPATIENT
Start: 2019-07-21 | End: 2019-07-25

## 2019-07-21 RX ORDER — IPRATROPIUM/ALBUTEROL SULFATE 18-103MCG
3 AEROSOL WITH ADAPTER (GRAM) INHALATION EVERY 6 HOURS
Refills: 0 | Status: DISCONTINUED | OUTPATIENT
Start: 2019-07-21 | End: 2019-07-25

## 2019-07-21 RX ORDER — AMLODIPINE BESYLATE 2.5 MG/1
2.5 TABLET ORAL
Refills: 0 | Status: DISCONTINUED | OUTPATIENT
Start: 2019-07-21 | End: 2019-07-25

## 2019-07-21 RX ORDER — METRONIDAZOLE 500 MG
500 TABLET ORAL EVERY 8 HOURS
Refills: 0 | Status: DISCONTINUED | OUTPATIENT
Start: 2019-07-21 | End: 2019-07-21

## 2019-07-21 RX ORDER — SODIUM CHLORIDE 9 MG/ML
1000 INJECTION, SOLUTION INTRAVENOUS
Refills: 0 | Status: DISCONTINUED | OUTPATIENT
Start: 2019-07-21 | End: 2019-07-21

## 2019-07-21 RX ORDER — SODIUM CHLORIDE 9 MG/ML
1000 INJECTION INTRAMUSCULAR; INTRAVENOUS; SUBCUTANEOUS
Refills: 0 | Status: DISCONTINUED | OUTPATIENT
Start: 2019-07-21 | End: 2019-07-23

## 2019-07-21 RX ORDER — METOPROLOL TARTRATE 50 MG
25 TABLET ORAL
Refills: 0 | Status: DISCONTINUED | OUTPATIENT
Start: 2019-07-21 | End: 2019-07-21

## 2019-07-21 RX ORDER — CHLORHEXIDINE GLUCONATE 213 G/1000ML
1 SOLUTION TOPICAL
Refills: 0 | Status: DISCONTINUED | OUTPATIENT
Start: 2019-07-21 | End: 2019-07-25

## 2019-07-21 RX ORDER — SODIUM CHLORIDE 9 MG/ML
1000 INJECTION INTRAMUSCULAR; INTRAVENOUS; SUBCUTANEOUS
Refills: 0 | Status: DISCONTINUED | OUTPATIENT
Start: 2019-07-21 | End: 2019-07-21

## 2019-07-21 RX ORDER — SODIUM CHLORIDE 9 MG/ML
1000 INJECTION INTRAMUSCULAR; INTRAVENOUS; SUBCUTANEOUS ONCE
Refills: 0 | Status: COMPLETED | OUTPATIENT
Start: 2019-07-21 | End: 2019-07-21

## 2019-07-21 RX ORDER — CIPROFLOXACIN LACTATE 400MG/40ML
400 VIAL (ML) INTRAVENOUS ONCE
Refills: 0 | Status: COMPLETED | OUTPATIENT
Start: 2019-07-21 | End: 2019-07-21

## 2019-07-21 RX ADMIN — ONDANSETRON 4 MILLIGRAM(S): 8 TABLET, FILM COATED ORAL at 01:21

## 2019-07-21 RX ADMIN — SODIUM CHLORIDE 1000 MILLILITER(S): 9 INJECTION, SOLUTION INTRAVENOUS at 02:33

## 2019-07-21 RX ADMIN — SODIUM CHLORIDE 1000 MILLILITER(S): 9 INJECTION INTRAMUSCULAR; INTRAVENOUS; SUBCUTANEOUS at 03:11

## 2019-07-21 RX ADMIN — DULOXETINE HYDROCHLORIDE 60 MILLIGRAM(S): 30 CAPSULE, DELAYED RELEASE ORAL at 13:50

## 2019-07-21 RX ADMIN — AMLODIPINE BESYLATE 2.5 MILLIGRAM(S): 2.5 TABLET ORAL at 17:21

## 2019-07-21 RX ADMIN — Medication 125 MILLIGRAM(S): at 14:53

## 2019-07-21 RX ADMIN — IOHEXOL 30 MILLILITER(S): 300 INJECTION, SOLUTION INTRAVENOUS at 01:21

## 2019-07-21 RX ADMIN — Medication 20 MILLIGRAM(S): at 02:33

## 2019-07-21 RX ADMIN — Medication 50 MILLIGRAM(S): at 17:22

## 2019-07-21 RX ADMIN — Medication 125 MILLIGRAM(S): at 23:52

## 2019-07-21 RX ADMIN — HEPARIN SODIUM 5000 UNIT(S): 5000 INJECTION INTRAVENOUS; SUBCUTANEOUS at 22:48

## 2019-07-21 RX ADMIN — MORPHINE SULFATE 2 MILLIGRAM(S): 50 CAPSULE, EXTENDED RELEASE ORAL at 01:36

## 2019-07-21 RX ADMIN — Medication 500 MILLIGRAM(S): at 13:50

## 2019-07-21 RX ADMIN — Medication 100 MILLIGRAM(S): at 13:50

## 2019-07-21 RX ADMIN — Medication 200 MILLIGRAM(S): at 07:25

## 2019-07-21 RX ADMIN — MORPHINE SULFATE 2 MILLIGRAM(S): 50 CAPSULE, EXTENDED RELEASE ORAL at 01:21

## 2019-07-21 RX ADMIN — SODIUM CHLORIDE 1000 MILLILITER(S): 9 INJECTION, SOLUTION INTRAVENOUS at 03:11

## 2019-07-21 RX ADMIN — SODIUM CHLORIDE 75 MILLILITER(S): 9 INJECTION INTRAMUSCULAR; INTRAVENOUS; SUBCUTANEOUS at 22:47

## 2019-07-21 RX ADMIN — SODIUM CHLORIDE 50 MILLILITER(S): 9 INJECTION INTRAMUSCULAR; INTRAVENOUS; SUBCUTANEOUS at 14:53

## 2019-07-21 RX ADMIN — SODIUM CHLORIDE 1000 MILLILITER(S): 9 INJECTION INTRAMUSCULAR; INTRAVENOUS; SUBCUTANEOUS at 01:22

## 2019-07-21 RX ADMIN — Medication 100 MILLIGRAM(S): at 07:25

## 2019-07-21 NOTE — CONSULT NOTE ADULT - ASSESSMENT
89 F PMH of HTN, OA, COPD on nebs at home (ex-smoker), CKD , lung cancer s/p partial lobectomy, bladder ca s/p nephrectomy, CKD and s/p cholecystectomy present c/o 10/10 lower ab pain associated with nausea/vomiting (1 episode) and diarrhea, since last night. She is hemodynamically stable. Her WBC is normal, C diff is positive and CT abdomen showed Mild circumferential wall thickening of the suboptimally   distended splenic flexure and descending colon compatible with colitis.    Non severe C diff Colitis:   - Keep NPO  -Can start on PO vancomycin 125mg PO four times a day.  - Can DC Cipro and Flagyl.  - Start IVF with RL at 75cc/hr  - monitor vital signs and look for dehydration.  - will follow up. 89 F PMH of HTN, OA, COPD on nebs at home (ex-smoker), CKD , lung cancer s/p partial lobectomy, bladder ca s/p nephrectomy, CKD and s/p cholecystectomy present c/o 10/10 lower ab pain associated with nausea/vomiting (1 episode) and diarrhea, since last night. She is hemodynamically stable. Her WBC is normal, C diff is positive and CT abdomen showed Mild circumferential wall thickening of the suboptimally   distended splenic flexure and descending colon compatible with colitis. Patent is not having diarrhea since last night.    First Episode of Non severe C diff Colitis: Resolving  - advance to clear liquids  -C/w PO vancomycin 125mg PO four times a day fro 14 days  -c/w RL at 75cc/hr and DC when patient is tolerating clear liquids  - monitor vital signs and look for dehydration.  - Repeat WBC and Cr everyday  - Follow up with Dr. Kendall (GI) as outpatient  - Please call prn with any questions.

## 2019-07-21 NOTE — ED PROVIDER NOTE - OBJECTIVE STATEMENT
89 yo female hx of HTN/Colitis/hypothyroid/hx of lung cancer s/p partial lobectomy/s/p nephrectomy and s/p cholecystectomy present c/o lower abdominal pain with constipation this evening. daughter reported patient has had similar complaints in the past and usually improved after she takes her stomach medication. patient usually has diarrhea daily but has had constipation in the past 2 days. also reported vomiting x 1 this evening and concerning for obstruction so they brought patient to ED for evaluation. patient has had diarrhea in ED and felt little better continue with abdominal pain.  denies fever/chill/chest pain/sob and other urinary sxs.

## 2019-07-21 NOTE — H&P ADULT - NSHPPHYSICALEXAM_GEN_ALL_CORE
PHYSICAL EXAM:  GENERAL: No acute distress, well-developed  HEAD:  Atraumatic, Normocephalic  EYES: EOMI, PERRLA, conjunctiva and sclera clear  NECK: Supple, no lymphadenopathy, no JVD  CHEST/LUNG: CTAB; No wheezes, rales, or rhonchi  HEART: Regular rate and rhythm; No murmurs, rubs, or gallops  ABDOMEN: Soft, tender to palpation diffusely however more prominent in lower quadrant, non-distended; normal bowel sounds, no organomegaly  EXTREMITIES:  2+ peripheral pulses b/l, No clubbing, cyanosis, mild edema b/l   NEUROLOGY: A&O x 3, no focal deficits  SKIN: No rashes or lesions PHYSICAL EXAM:  GENERAL: No acute distress, well-developed  HEAD:  Atraumatic, Normocephalic  EYES: EOMI, PERRLA, conjunctiva and sclera clear  NECK: Supple, no lymphadenopathy, no JVD  CHEST/LUNG: CTAB; No wheezes, rales, or rhonchi  HEART: Regular rate and rhythm; No murmurs, rubs, or gallops  ABDOMEN: Soft, tender to palpation diffusely however more prominent in lower quadrant, non-distended; normal bowel sounds, no organomegaly  EXTREMITIES:  2+ peripheral pulses b/l, No clubbing, cyanosis, mild edema b/l   NEUROLOGY:  no focal deficits  PSYCH: A&O x 3

## 2019-07-21 NOTE — H&P ADULT - NSHPLABSRESULTS_GEN_ALL_CORE
Labs:      07-21    135  |  103  |  56<H>  ----------------------------<  228<H>  4.8   |  16<L>  |  2.7<H>    Ca    8.8      21 Jul 2019 03:00    TPro  7.0  /  Alb  4.1  /  TBili  <0.2  /  DBili  x   /  AST  14  /  ALT  8   /  AlkPhos  150<H>  07-21      PT/INR - ( 21 Jul 2019 00:40 )   PT: 12.00 sec;   INR: 1.04 ratio         PTT - ( 21 Jul 2019 00:40 )  PTT:30.9 sec          LIVER FUNCTIONS - ( 21 Jul 2019 00:40 )  Alb: 4.1 g/dL / Pro: 7.0 g/dL / ALK PHOS: 150 U/L / ALT: 8 U/L / AST: 14 U/L / GGT: x             < from: CT Abdomen and Pelvis w/ Oral Cont (07.21.19 @ 04:46) >    IMPRESSION:     Findings compatible with left-sided colitis.    Age indeterminant T10 and L2 vertebra.    Aneurysmal dilatation of the infrarenal abdominal aorta      < end of copied text >    < from: Xray Chest 1 View-PORTABLE IMMEDIATE (07.21.19 @ 02:06) >    Impression:      No radiographic evidence of acute cardiopulmonary disease.

## 2019-07-21 NOTE — H&P ADULT - ATTENDING COMMENTS
PHYSICAL EXAM:    CONSTITUTIONAL: NAD, nontoxic appearing, comfortable lying in stretcher  ENMT: EOMI, PERRLA, No tonsillar erythema, exudates, or enlargement, neck supple, No JVD  PSYCH: Alert & Oriented X3   RESPIRATORY: Clear to percussion bilaterally; No rales, rhonchi, wheezing, or rubs  CARDIOVASCULAR: Regular rate and rhythm; No murmurs, rubs, or gallops, negative edema  GASTROINTESTINAL: Soft, Mild tenderness to palpation over left quadrant, Nondistended; Bowel sounds present  EXTREMITIES:  2+ Peripheral Pulses, No clubbing, cyanosis  SKIN: No rashes     89 yo F with PMHx of HTN, OA, COPD not on home O2, Hypothyroidism, lung cancer s/p partial lobectomy, bladder ca s/p left nephrectomy (states procedure required left nephrectomy), CKD IV, Diverticulosis presents with complaint of worsening diarrhea since yesterday, patient reports having had 4-5 episodes of watery diarrhea associated with lower abdominal pain. Patient states she was recently seen by her PMD last week for reasons she cannot remember and was prescribed a 3 day course of antibiotics. Patient reported one episode of nausea and nonbloody, nonbilious vomitus as well last week. Patient denies fevers, shortness of breath, chest pain, palpitations; endorses baseline chills.      #C. 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 Chronic left inferior pubic ramus and body fracture. Chronic   left sacral ala fracture.  -Patient denies back or hip pain  -Consider neurosurgery and orthopedic follow up as outpatient    # OA  - Tylenol prn    # Hypothyroid  -Continue synthroid     Code Status: Full Code    Disposition: Home when medically stable PHYSICAL EXAM:    CONSTITUTIONAL: NAD, nontoxic appearing, comfortable lying in stretcher  ENMT: EOMI, PERRLA, No tonsillar erythema, exudates, or enlargement, neck supple, No JVD  PSYCH: Alert & Oriented X3   RESPIRATORY: Clear to percussion bilaterally; No rales, rhonchi, wheezing, or rubs  CARDIOVASCULAR: Regular rate and rhythm; No murmurs, rubs, or gallops, negative edema  GASTROINTESTINAL: Soft, Mild tenderness to palpation over left quadrant, Nondistended; Bowel sounds present  EXTREMITIES:  2+ Peripheral Pulses, No clubbing, cyanosis  SKIN: No rashes     89 yo F with PMHx of HTN, OA, COPD not on home O2, Hypothyroidism, lung cancer s/p partial lobectomy, bladder ca s/p left nephrectomy (states procedure required left nephrectomy), CKD IV, Diverticulosis presents with complaint of worsening diarrhea since yesterday, patient reports having had 4-5 episodes of watery diarrhea associated with lower abdominal pain. Patient states she was recently seen by her PMD last week for reasons she cannot remember and was prescribed a 3 day course of antibiotics. Patient reported one episode of nausea and nonbloody, nonbilious vomitus as well last week. Patient denies fevers, shortness of breath, chest pain, palpitations; endorses baseline chills.      #C. 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 Chronic left inferior pubic ramus and body fracture. Chronic   left sacral ala fracture.  -Patient denies back or hip pain  -Consider neurosurgery and orthopedic follow up as outpatient    # OA  - Tylenol prn    # Hypothyroid  -Continue synthroid     Code Status: Full Code    Disposition: Home when medically stable PHYSICAL EXAM:    CONSTITUTIONAL: NAD, nontoxic appearing, comfortable lying in stretcher  ENMT: EOMI, PERRLA, No tonsillar erythema, exudates, or enlargement, neck supple, No JVD  PSYCH: Alert & Oriented X3   RESPIRATORY: Clear to percussion bilaterally; No rales, rhonchi, wheezing, or rubs  CARDIOVASCULAR: Regular rate and rhythm; No murmurs, rubs, or gallops, negative edema  GASTROINTESTINAL: Soft, Mild tenderness to palpation over left quadrant, Nondistended; Bowel sounds present  EXTREMITIES:  2+ Peripheral Pulses, No clubbing, cyanosis  SKIN: No rashes     89 yo F with PMHx of HTN, OA, COPD not on home O2, Hypothyroidism, lung cancer s/p partial lobectomy, bladder ca s/p left nephrectomy (states procedure required left nephrectomy), CKD IV, Diverticulosis presents with complaint of worsening diarrhea since yesterday, patient reports having had 4-5 episodes of watery diarrhea associated with lower abdominal pain. Patient states she was recently seen by her PMD last week for reasons she cannot remember and was prescribed a 3 day course of antibiotics. Patient reported one episode of nausea and nonbloody, nonbilious vomitus as well last week. Patient denies fevers, shortness of breath, chest pain, palpitations; endorses baseline chills.      #C. Diff Colitis  -Gastroenterology recommendations noted  -Continue PO Vancomycin  -NPO, IVF    #MANA on CKD IV  -Baseline Cr around 1.9, Cr trending down  -Continue gentle IVF  -Monitor BMP, urine studies, f/u Renal/Bladder US  -Consider Nephrology evaluation if worsening status.    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 Chronic left inferior pubic ramus and body fracture. Chronic   left sacral ala fracture.  -Patient denies back or hip pain  -Consider neurosurgery and orthopedic follow up as outpatient    # OA  - Tylenol prn    # Hypothyroid  -Continue synthroid     Code Status: Full Code    Disposition: Home when medically stable

## 2019-07-21 NOTE — ED PROVIDER NOTE - CARE PLAN
Principal Discharge DX:	Acute on chronic kidney failure  Secondary Diagnosis:	Colitis Principal Discharge DX:	Acute on chronic kidney failure  Secondary Diagnosis:	Colitis  Secondary Diagnosis:	Dehydration

## 2019-07-21 NOTE — H&P ADULT - NSHPSOCIALHISTORY_GEN_ALL_CORE
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

## 2019-07-21 NOTE — H&P ADULT - HISTORY OF PRESENT ILLNESS
· HPI Objective Statement: 89 yo female hx of HTN/Colitis/hypothyroid/hx of lung cancer s/p partial lobectomy/s/p nephrectomy and s/p cholecystectomy present c/o lower abdominal pain with constipation this evening. daughter reported patient has had similar complaints in the past and usually improved after she takes her stomach medication. patient usually has diarrhea daily but has had constipation in the past 2 days. also reported vomiting x 1 this evening and concerning for obstruction so they brought patient to ED for evaluation. patient has had diarrhea in ED and felt little better continue with abdominal pain. denies fever/chill/chest pain/sob and other urinary sxs. 89 F PMH of HTN, OA, COPD on nebs at home (ex-smoker), Colitis, hypothyroid, lung cancer s/p partial lobectomy, bladder ca s/p nephrectomy, CKD and s/p cholecystectomy present c/o 10/10 lower ab pain associated with nausea/vomiting (1 episode) and diarrhea, since last night. The pt has a h/o of colitis, she follows with Dr. Kendall (GI) every other month and she does have ab pain at baseline. ROS were negative for current nausea, vomiting, watery diarrhea, hematochezia. The pt endorses urinary frequency, denies dysuria, hematuria. However, the pain is worse from baseline. While in the ED, the pt was given 2L IVF, (LR and NS), Cipro and flagyl, Zofran. 89 F PMH of HTN, OA, COPD on nebs at home (ex-smoker), Colitis, hypothyroid, lung cancer s/p partial lobectomy, bladder ca s/p left nephrectomy, CKD and s/p cholecystectomy present c/o 10/10 lower ab pain associated with nausea/vomiting (1 episode) and diarrhea, since last night. The pt has a h/o of colitis, she follows with Dr. Kendall (GI) every other month and she does have ab pain at baseline. ROS were negative for current nausea, vomiting, watery diarrhea, hematochezia. The pt endorses urinary frequency, denies dysuria, hematuria. However, the pain is worse from baseline. While in the ED, the pt was given 2L IVF, (LR and NS), Cipro and flagyl, Zofran.

## 2019-07-21 NOTE — ED ADULT NURSE NOTE - OBJECTIVE STATEMENT
pt. c/o lower abdominal pain w/ constipation, bowel movement in ED x3, MD made aware, pt. c/o tenderness to lower abd area

## 2019-07-21 NOTE — ED PROVIDER NOTE - ATTENDING CONTRIBUTION TO CARE
89f w HTN, hypothyroid, lung cancer in remission s/p L lobe resection, and bladder cancer in remission s/p L nephrectomy. Pt w hx of recurrent colitis. Pt presents w lower abdominal pain tonight. Pain is sharp, moderate, constant, no radiating, no exacerbating/alleviating. Pain is similar to prior episodes of colitis. Pt initially had constipation and vomiting x1 (no blood/bile). Pt currently having diarrhea in ED. No black/bloody stools. No recent travel/hosp/immobilization. Pt currently on abx.     Review of Systems  Constitutional:  No fever or chills.   Eyes:  Negative.   ENMT:  No nasal congestion, discharge, or throat pain.   Cardiac:  No chest pain, syncope, or edema.  Respiratory:  No dyspnea, wheezing, or cough. No hemoptysis.  GI:  See HPI  :  No dysuria or hematuria.   Musculoskeletal:  No joint swelling, joint pain, or back pain.  Skin:  No skin rash, jaundice, or lesions.  Neuro:  No headache, loss of sensation, or focal weakness.  No change in mental status.     Physical Exam  General: Awake, alert, NAD, elderly/frail, non-toxic appearing, NCAT  Eyes: PERRL, EOMI, no icterus, lids and conjunctivae are normal  ENT: External inspection normal, pink/dry membranes, pharynx normal  CV: S1S2, regular rate and rhythm, no murmur/gallops/rubs, no JVD, 2+ pulses b/l, no edema/cords/homans, warm/well-perfused  Respiratory: Normal respiratory rate/effort, no respiratory distress, normal voice, speaking full sentences, lungs clear to auscultation b/l, no wheezing/rales/rhonchi, no retractions, no stridor  Abdomen: Soft abdomen, diffuse lower tender, no distended/guarding/rebound, no CVA tender  Musculoskeletal: FROM all 4 extremities, N/V intact  Neck: FROM neck, supple, no meningismus, trachea midline, no JVD  Integumentary: Color normal for race, warm and dry, no rash  Neuro: Oriented x3, CN 2-12 grossly intact, normal motor, normal sensory  Psych: Oriented x3, mood normal, affect normal     89f w lower abd pain/tender and diarrhea concerning for colitis. --Labs, CT r/o colitis/diverticulitis/abscess/perf, IV fluids, abx, Analgesia/antiemetics as needed, observe/re-assess.

## 2019-07-21 NOTE — H&P ADULT - NSICDXFAMILYHX_GEN_ALL_CORE_FT
FAMILY HISTORY:  Sibling  Still living? No  Family history of cancer in sister, Age at diagnosis: Age Unknown

## 2019-07-21 NOTE — ED PROVIDER NOTE - PROGRESS NOTE DETAILS
last creatinine 1.6 today creatinine 2.9. patient only has one kidney. will admit for further management and monitoring.

## 2019-07-21 NOTE — ED ADULT NURSE NOTE - NSIMPLEMENTINTERV_GEN_ALL_ED
Implemented All Fall with Harm Risk Interventions:  [place] to call system. Call bell, personal items and telephone within reach. Instruct patient to call for assistance. Room bathroom lighting operational. Non-slip footwear when patient is off stretcher. Physically safe environment: no spills, clutter or unnecessary equipment. Stretcher in lowest position, wheels locked, appropriate side rails in place. Provide visual cue, wrist band, yellow gown, etc. Monitor gait and stability. Monitor for mental status changes and reorient to person, place, and time. Review medications for side effects contributing to fall risk. Reinforce activity limits and safety measures with patient and family. Provide visual clues: red socks.

## 2019-07-21 NOTE — H&P ADULT - NSICDXPASTSURGICALHX_GEN_ALL_CORE_FT
PAST SURGICAL HISTORY:  H/O breast surgery 25% calcification removed    History of hip replacement, total, right     History of nephrectomy Left    History of total left knee replacement (TKR)     Malignant neoplasm of urinary bladder, unspecified site Removal x 3

## 2019-07-21 NOTE — H&P ADULT - ASSESSMENT
89 F PMH of HTN, OA, COPD on nebs at home (ex-smoker), Colitis, hypothyroid, lung cancer s/p partial lobectomy, bladder ca s/p nephrectomy, CKD and s/p cholecystectomy presents for increased lower quad ab pain secondary to colitis    # C.diff colitis/ischemic colitis with poss combined acute diverticulitis   - c/w cipro, flagyl and oral vanc   - hydration   - monitor BMs   - C.diff positive (first episode per GI)  - c/s GI service, Dr. Kendall does not come to this hospital   - CT a/p demonstrated L-sided colitis     # COPD, ex-smoker   - uses inhalers at home    # OA  - Tylenol prn    # CKD (one kidney) vs MANA  - baseline crt.     # hypothyroid- c/w synthroid     Diet: Keep NPO   Ambulate as tolerated   Full Code   Dispo: home 89 F PMH of HTN, OA, COPD on nebs at home (ex-smoker), Colitis, hypothyroid, lung cancer s/p partial lobectomy, bladder ca s/p nephrectomy, CKD and s/p cholecystectomy presents for increased lower quad ab pain secondary to colitis    # C.diff colitis/ischemic colitis with poss combined acute diverticulitis   - c/w cipro, flagyl and oral vanc   - hydration   - NPO for now  - monitor BMs   - C.diff positive (first episode per GI)  - c/s GI service, Dr. 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 Russ, on last visit with him, an abnormal lesion was seen however, she never followed up for lesion in the bladder    # T10 and L2 vert compression deformity- unknown chronicity, with chronic L inf pubic ramus and body fx, L sacral ala fx   - pt denies pain    # hypergylcemia, no h/o DM  - f/u FS    # OA hands, knees (s/p L total knee replacement )  - Tylenol prn    # MANA on CKD (one kidney)   - baseline crt. 1.7-1.9  - light hydration (IVF), monitor BMP    # hypothyroid- c/w synthroid     Diet: Keep NPO   Ambulate as tolerated   Full Code   Dispo: home

## 2019-07-21 NOTE — H&P ADULT - NSICDXPASTMEDICALHX_GEN_ALL_CORE_FT
PAST MEDICAL HISTORY:  Cancer Bladder 1981    Colitis with diarrhea    COPD (chronic obstructive pulmonary disease)     COPD with emphysema     HTN (hypertension)     Other type of osteoarthritis

## 2019-07-22 LAB
ANION GAP SERPL CALC-SCNC: 10 MMOL/L — SIGNIFICANT CHANGE UP (ref 7–14)
ANION GAP SERPL CALC-SCNC: 11 MMOL/L — SIGNIFICANT CHANGE UP (ref 7–14)
BASOPHILS # BLD AUTO: 0.02 K/UL — SIGNIFICANT CHANGE UP (ref 0–0.2)
BASOPHILS # BLD AUTO: 0.02 K/UL — SIGNIFICANT CHANGE UP (ref 0–0.2)
BASOPHILS NFR BLD AUTO: 0.2 % — SIGNIFICANT CHANGE UP (ref 0–1)
BASOPHILS NFR BLD AUTO: 0.3 % — SIGNIFICANT CHANGE UP (ref 0–1)
BLD GP AB SCN SERPL QL: SIGNIFICANT CHANGE UP
BUN SERPL-MCNC: 45 MG/DL — HIGH (ref 10–20)
BUN SERPL-MCNC: 47 MG/DL — HIGH (ref 10–20)
CALCIUM SERPL-MCNC: 9 MG/DL — SIGNIFICANT CHANGE UP (ref 8.5–10.1)
CALCIUM SERPL-MCNC: 9.2 MG/DL — SIGNIFICANT CHANGE UP (ref 8.5–10.1)
CHLORIDE SERPL-SCNC: 109 MMOL/L — SIGNIFICANT CHANGE UP (ref 98–110)
CHLORIDE SERPL-SCNC: 110 MMOL/L — SIGNIFICANT CHANGE UP (ref 98–110)
CO2 SERPL-SCNC: 17 MMOL/L — SIGNIFICANT CHANGE UP (ref 17–32)
CO2 SERPL-SCNC: 18 MMOL/L — SIGNIFICANT CHANGE UP (ref 17–32)
CREAT SERPL-MCNC: 2.2 MG/DL — HIGH (ref 0.7–1.5)
CREAT SERPL-MCNC: 2.2 MG/DL — HIGH (ref 0.7–1.5)
EOSINOPHIL # BLD AUTO: 0.1 K/UL — SIGNIFICANT CHANGE UP (ref 0–0.7)
EOSINOPHIL # BLD AUTO: 0.11 K/UL — SIGNIFICANT CHANGE UP (ref 0–0.7)
EOSINOPHIL NFR BLD AUTO: 1.2 % — SIGNIFICANT CHANGE UP (ref 0–8)
EOSINOPHIL NFR BLD AUTO: 1.5 % — SIGNIFICANT CHANGE UP (ref 0–8)
ESTIMATED AVERAGE GLUCOSE: 105 MG/DL — SIGNIFICANT CHANGE UP (ref 68–114)
GLUCOSE BLDC GLUCOMTR-MCNC: 108 MG/DL — HIGH (ref 70–99)
GLUCOSE BLDC GLUCOMTR-MCNC: 109 MG/DL — HIGH (ref 70–99)
GLUCOSE BLDC GLUCOMTR-MCNC: 92 MG/DL — SIGNIFICANT CHANGE UP (ref 70–99)
GLUCOSE SERPL-MCNC: 100 MG/DL — HIGH (ref 70–99)
GLUCOSE SERPL-MCNC: 116 MG/DL — HIGH (ref 70–99)
HBA1C BLD-MCNC: 5.3 % — SIGNIFICANT CHANGE UP (ref 4–5.6)
HCT VFR BLD CALC: 21.8 % — LOW (ref 37–47)
HCT VFR BLD CALC: 22.4 % — LOW (ref 37–47)
HCT VFR BLD CALC: 22.9 % — LOW (ref 37–47)
HGB BLD-MCNC: 6.9 G/DL — CRITICAL LOW (ref 12–16)
HGB BLD-MCNC: 7.1 G/DL — LOW (ref 12–16)
HGB BLD-MCNC: 7.2 G/DL — LOW (ref 12–16)
IMM GRANULOCYTES NFR BLD AUTO: 0.3 % — SIGNIFICANT CHANGE UP (ref 0.1–0.3)
IMM GRANULOCYTES NFR BLD AUTO: 0.5 % — HIGH (ref 0.1–0.3)
LYMPHOCYTES # BLD AUTO: 1.03 K/UL — LOW (ref 1.2–3.4)
LYMPHOCYTES # BLD AUTO: 1.44 K/UL — SIGNIFICANT CHANGE UP (ref 1.2–3.4)
LYMPHOCYTES # BLD AUTO: 12.8 % — LOW (ref 20.5–51.1)
LYMPHOCYTES # BLD AUTO: 20 % — LOW (ref 20.5–51.1)
MCHC RBC-ENTMCNC: 29.8 PG — SIGNIFICANT CHANGE UP (ref 27–31)
MCHC RBC-ENTMCNC: 30.5 PG — SIGNIFICANT CHANGE UP (ref 27–31)
MCHC RBC-ENTMCNC: 30.7 PG — SIGNIFICANT CHANGE UP (ref 27–31)
MCHC RBC-ENTMCNC: 31 G/DL — LOW (ref 32–37)
MCHC RBC-ENTMCNC: 31.7 G/DL — LOW (ref 32–37)
MCHC RBC-ENTMCNC: 32.1 G/DL — SIGNIFICANT CHANGE UP (ref 32–37)
MCV RBC AUTO: 94.9 FL — SIGNIFICANT CHANGE UP (ref 81–99)
MCV RBC AUTO: 96.2 FL — SIGNIFICANT CHANGE UP (ref 81–99)
MCV RBC AUTO: 96.9 FL — SIGNIFICANT CHANGE UP (ref 81–99)
MONOCYTES # BLD AUTO: 0.85 K/UL — HIGH (ref 0.1–0.6)
MONOCYTES # BLD AUTO: 0.98 K/UL — HIGH (ref 0.1–0.6)
MONOCYTES NFR BLD AUTO: 11.8 % — HIGH (ref 1.7–9.3)
MONOCYTES NFR BLD AUTO: 12.2 % — HIGH (ref 1.7–9.3)
NEUTROPHILS # BLD AUTO: 4.77 K/UL — SIGNIFICANT CHANGE UP (ref 1.4–6.5)
NEUTROPHILS # BLD AUTO: 5.89 K/UL — SIGNIFICANT CHANGE UP (ref 1.4–6.5)
NEUTROPHILS NFR BLD AUTO: 66.1 % — SIGNIFICANT CHANGE UP (ref 42.2–75.2)
NEUTROPHILS NFR BLD AUTO: 73.1 % — SIGNIFICANT CHANGE UP (ref 42.2–75.2)
NRBC # BLD: 0 /100 WBCS — SIGNIFICANT CHANGE UP (ref 0–0)
PLATELET # BLD AUTO: 161 K/UL — SIGNIFICANT CHANGE UP (ref 130–400)
PLATELET # BLD AUTO: 167 K/UL — SIGNIFICANT CHANGE UP (ref 130–400)
PLATELET # BLD AUTO: 168 K/UL — SIGNIFICANT CHANGE UP (ref 130–400)
POTASSIUM SERPL-MCNC: 5.3 MMOL/L — HIGH (ref 3.5–5)
POTASSIUM SERPL-MCNC: 5.4 MMOL/L — HIGH (ref 3.5–5)
POTASSIUM SERPL-SCNC: 5.3 MMOL/L — HIGH (ref 3.5–5)
POTASSIUM SERPL-SCNC: 5.4 MMOL/L — HIGH (ref 3.5–5)
RBC # BLD: 2.25 M/UL — LOW (ref 4.2–5.4)
RBC # BLD: 2.36 M/UL — LOW (ref 4.2–5.4)
RBC # BLD: 2.38 M/UL — LOW (ref 4.2–5.4)
RBC # FLD: 17.2 % — HIGH (ref 11.5–14.5)
RBC # FLD: 17.2 % — HIGH (ref 11.5–14.5)
RBC # FLD: 17.3 % — HIGH (ref 11.5–14.5)
SODIUM SERPL-SCNC: 137 MMOL/L — SIGNIFICANT CHANGE UP (ref 135–146)
SODIUM SERPL-SCNC: 138 MMOL/L — SIGNIFICANT CHANGE UP (ref 135–146)
WBC # BLD: 7.21 K/UL — SIGNIFICANT CHANGE UP (ref 4.8–10.8)
WBC # BLD: 8.06 K/UL — SIGNIFICANT CHANGE UP (ref 4.8–10.8)
WBC # BLD: 8.18 K/UL — SIGNIFICANT CHANGE UP (ref 4.8–10.8)
WBC # FLD AUTO: 7.21 K/UL — SIGNIFICANT CHANGE UP (ref 4.8–10.8)
WBC # FLD AUTO: 8.06 K/UL — SIGNIFICANT CHANGE UP (ref 4.8–10.8)
WBC # FLD AUTO: 8.18 K/UL — SIGNIFICANT CHANGE UP (ref 4.8–10.8)

## 2019-07-22 PROCEDURE — 99223 1ST HOSP IP/OBS HIGH 75: CPT | Mod: AI

## 2019-07-22 PROCEDURE — 99223 1ST HOSP IP/OBS HIGH 75: CPT

## 2019-07-22 PROCEDURE — 76770 US EXAM ABDO BACK WALL COMP: CPT | Mod: 26

## 2019-07-22 RX ORDER — DEXTROSE 50 % IN WATER 50 %
50 SYRINGE (ML) INTRAVENOUS ONCE
Refills: 0 | Status: COMPLETED | OUTPATIENT
Start: 2019-07-22 | End: 2019-07-22

## 2019-07-22 RX ORDER — INSULIN HUMAN 100 [IU]/ML
10 INJECTION, SOLUTION SUBCUTANEOUS ONCE
Refills: 0 | Status: COMPLETED | OUTPATIENT
Start: 2019-07-22 | End: 2019-07-22

## 2019-07-22 RX ORDER — METOPROLOL TARTRATE 50 MG
25 TABLET ORAL DAILY
Refills: 0 | Status: COMPLETED | OUTPATIENT
Start: 2019-07-22 | End: 2019-07-22

## 2019-07-22 RX ADMIN — SODIUM CHLORIDE 75 MILLILITER(S): 9 INJECTION INTRAMUSCULAR; INTRAVENOUS; SUBCUTANEOUS at 22:33

## 2019-07-22 RX ADMIN — CHLORHEXIDINE GLUCONATE 1 APPLICATION(S): 213 SOLUTION TOPICAL at 05:21

## 2019-07-22 RX ADMIN — Medication 3 MILLILITER(S): at 10:40

## 2019-07-22 RX ADMIN — DULOXETINE HYDROCHLORIDE 60 MILLIGRAM(S): 30 CAPSULE, DELAYED RELEASE ORAL at 11:27

## 2019-07-22 RX ADMIN — HEPARIN SODIUM 5000 UNIT(S): 5000 INJECTION INTRAVENOUS; SUBCUTANEOUS at 22:33

## 2019-07-22 RX ADMIN — Medication 125 MILLIGRAM(S): at 17:06

## 2019-07-22 RX ADMIN — INSULIN HUMAN 10 UNIT(S): 100 INJECTION, SOLUTION SUBCUTANEOUS at 05:16

## 2019-07-22 RX ADMIN — AMLODIPINE BESYLATE 2.5 MILLIGRAM(S): 2.5 TABLET ORAL at 05:21

## 2019-07-22 RX ADMIN — Medication 3 MILLILITER(S): at 20:29

## 2019-07-22 RX ADMIN — Medication 125 MILLIGRAM(S): at 05:21

## 2019-07-22 RX ADMIN — AMLODIPINE BESYLATE 2.5 MILLIGRAM(S): 2.5 TABLET ORAL at 17:06

## 2019-07-22 RX ADMIN — Medication 50 MILLILITER(S): at 05:15

## 2019-07-22 RX ADMIN — Medication 75 MICROGRAM(S): at 05:21

## 2019-07-22 RX ADMIN — HEPARIN SODIUM 5000 UNIT(S): 5000 INJECTION INTRAVENOUS; SUBCUTANEOUS at 05:20

## 2019-07-22 RX ADMIN — HEPARIN SODIUM 5000 UNIT(S): 5000 INJECTION INTRAVENOUS; SUBCUTANEOUS at 13:12

## 2019-07-22 RX ADMIN — PANTOPRAZOLE SODIUM 40 MILLIGRAM(S): 20 TABLET, DELAYED RELEASE ORAL at 06:12

## 2019-07-22 RX ADMIN — Medication 125 MILLIGRAM(S): at 11:27

## 2019-07-22 NOTE — PROGRESS NOTE ADULT - SUBJECTIVE AND OBJECTIVE BOX
SUBJECTIVE:  Patient is a 89y old Female who presents with a chief complaint of Colitis and MANA (21 Jul 2019 16:10)  Currently admitted to medicine with the primary diagnosis of Acute on chronic kidney failure/C. diff colitis       Today is hospital day 1d. This morning she is resting in bed, denies any BM in last 24 hours. denies any overnight events      PAST MEDICAL & SURGICAL HISTORY  COPD with emphysema  Colitis: with diarrhea  Other type of osteoarthritis  HTN (hypertension)  Cancer: Bladder 1981  COPD (chronic obstructive pulmonary disease)  H/O breast surgery: 25% calcification removed  Malignant neoplasm of urinary bladder, unspecified site: Removal x 3  History of nephrectomy: Left  History of total left knee replacement (TKR)  History of hip replacement, total, right    SOCIAL HISTORY:  Negative for smoking/alcohol/drug use.     ALLERGIES:  Contrast Dye (Hives; Rash)  No Known Drug Allergies    MEDICATIONS:  STANDING MEDICATIONS  ALBUTerol/ipratropium for Nebulization 3 milliLiter(s) Nebulizer every 6 hours  amLODIPine   Tablet 2.5 milliGRAM(s) Oral <User Schedule>  chlorhexidine 4% Liquid 1 Application(s) Topical <User Schedule>  DULoxetine 60 milliGRAM(s) Oral daily  heparin  Injectable 5000 Unit(s) SubCutaneous every 8 hours  levothyroxine 75 MICROGram(s) Oral daily  pantoprazole    Tablet 40 milliGRAM(s) Oral before breakfast  sodium chloride 0.9%. 1000 milliLiter(s) IV Continuous <Continuous>  vancomycin    Solution 125 milliGRAM(s) Oral every 6 hours    PRN MEDICATIONS    Home Medications:  amLODIPine 2.5 mg oral tablet: 1 tab(s) orally once a day (10 Oct 2018 10:29)  anti diarrheal: 2 milligram(s) orally once a day (10 Oct 2018 10:29)  Donnatal oral tablet: 1 tab(s) orally 2 times a day, As Needed (10 Oct 2018 10:29)  DULoxetine 60 mg oral delayed release capsule: 1 cap(s) orally once a day (10 Oct 2018 10:29)  levothyroxine 75 mcg (0.075 mg) oral tablet: 1 tab(s) orally once a day (10 Oct 2018 10:29)  lisinopril 20 mg oral tablet: 1 tab(s) orally once a day (10 Oct 2018 10:29)  metoprolol succinate 25 mg oral tablet, extended release: 1 tab(s) orally 2 times a day (10 Oct 2018 10:29)    Vital Signs Last 24 Hrs  T(C): 36.2 (22 Jul 2019 06:44), Max: 36.7 (22 Jul 2019 00:20)  T(F): 97.2 (22 Jul 2019 06:44), Max: 98.1 (22 Jul 2019 00:20)  HR: 70 (22 Jul 2019 06:44) (65 - 80)  BP: 135/88 (22 Jul 2019 06:44) (118/56 - 136/86)  BP(mean): --  RR: 18 (22 Jul 2019 06:44) (18 - 18)  SpO2: 95% (22 Jul 2019 06:44) (95% - 95%)      LABS:                        7.2    8.18  )-----------( 161      ( 22 Jul 2019 08:18 )             22.4     07-22    138  |  109  |  45<H>  ----------------------------<  100<H>  5.3<H>   |  18  |  2.2<H>    Ca    9.2      22 Jul 2019 08:18    TPro  7.0  /  Alb  4.1  /  TBili  <0.2  /  DBili  x   /  AST  14  /  ALT  8   /  AlkPhos  150<H>  07-21    PT/INR - ( 21 Jul 2019 00:40 )   PT: 12.00 sec;   INR: 1.04 ratio         PTT - ( 21 Jul 2019 00:40 )  PTT:30.9 sec          PHYSICAL EXAM:  GEN: No acute distress  LUNGS: Clear to auscultation bilaterally   HEART: S1/S2 present. RRR.   ABD: Soft, non-tender, non-distended. Bowel sounds present  NEURO: AAO x 3

## 2019-07-22 NOTE — PROGRESS NOTE ADULT - ASSESSMENT
89 F PMH of HTN, OA, COPD on nebs at home (ex-smoker), Colitis, hypothyroid, lung cancer s/p partial lobectomy, bladder ca s/p nephrectomy, CKD and s/p cholecystectomy presents for increased lower quad ab pain secondary to colitis      # Acute drop in Hb, normocytic  - 9.2 > 7.2  - Not sure of baseline as no records in [facility]  - no apparent source of bleeding. Pt hemodynamically stable.  - repeat CBC at 11  - active type and screen  - peripheral 18G iv line x 2      # C. diff colitis/ History of severe diverticulosis (as per GI team, no records in [facility]), Pt follows with Dr Kendall GI  - Dr. Kendall does not come to this hospital, GI team on board  - C. 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 Russ      # T10 and L2 vert compression deformity- unknown chronicity, with chronic L inf pubic ramus and body fx, L sacral ala fx   - pt denies pain      # hypergylcemia, no h/o DM  - f/u FS  - HbA1c ordered      # OA hands, knees (s/p L total knee replacement )  - Tylenol prn      # MANA on CKD (one kidney, s/p left nephrectomy)  - baseline creatt. 1.7-1.9  - cw ivf  - monitor BMP      # hypothyroid- c/w synthroid       DVT PPx: Heparin  GI PPx: PPI  Diet: advanced  Ambulate as tolerated   Full Code   Dispo: home  PT eval pending

## 2019-07-22 NOTE — PATIENT PROFILE ADULT - NSPROPOAPRESSUREINJURY_GEN_A_NUR
may be secondary to UTI or being bedbound and history of diabetes.  patient had difficult catheter placement.  [place] in place and draining .  Flomax 0.4mg daily  keep catheter 1 week to allow healing from swelling prior to TOV yes

## 2019-07-23 LAB
ANION GAP SERPL CALC-SCNC: 8 MMOL/L — SIGNIFICANT CHANGE UP (ref 7–14)
BASOPHILS # BLD AUTO: 0.03 K/UL — SIGNIFICANT CHANGE UP (ref 0–0.2)
BASOPHILS NFR BLD AUTO: 0.6 % — SIGNIFICANT CHANGE UP (ref 0–1)
BUN SERPL-MCNC: 36 MG/DL — HIGH (ref 10–20)
CALCIUM SERPL-MCNC: 9.1 MG/DL — SIGNIFICANT CHANGE UP (ref 8.5–10.1)
CHLORIDE SERPL-SCNC: 110 MMOL/L — SIGNIFICANT CHANGE UP (ref 98–110)
CO2 SERPL-SCNC: 19 MMOL/L — SIGNIFICANT CHANGE UP (ref 17–32)
CREAT SERPL-MCNC: 2.1 MG/DL — HIGH (ref 0.7–1.5)
EOSINOPHIL # BLD AUTO: 0.15 K/UL — SIGNIFICANT CHANGE UP (ref 0–0.7)
EOSINOPHIL NFR BLD AUTO: 2.8 % — SIGNIFICANT CHANGE UP (ref 0–8)
GLUCOSE SERPL-MCNC: 109 MG/DL — HIGH (ref 70–99)
HCT VFR BLD CALC: 23.2 % — LOW (ref 37–47)
HGB BLD-MCNC: 7.4 G/DL — LOW (ref 12–16)
IMM GRANULOCYTES NFR BLD AUTO: 0.4 % — HIGH (ref 0.1–0.3)
LYMPHOCYTES # BLD AUTO: 0.68 K/UL — LOW (ref 1.2–3.4)
LYMPHOCYTES # BLD AUTO: 12.7 % — LOW (ref 20.5–51.1)
MCHC RBC-ENTMCNC: 30.5 PG — SIGNIFICANT CHANGE UP (ref 27–31)
MCHC RBC-ENTMCNC: 31.9 G/DL — LOW (ref 32–37)
MCV RBC AUTO: 95.5 FL — SIGNIFICANT CHANGE UP (ref 81–99)
MONOCYTES # BLD AUTO: 0.73 K/UL — HIGH (ref 0.1–0.6)
MONOCYTES NFR BLD AUTO: 13.6 % — HIGH (ref 1.7–9.3)
NEUTROPHILS # BLD AUTO: 3.76 K/UL — SIGNIFICANT CHANGE UP (ref 1.4–6.5)
NEUTROPHILS NFR BLD AUTO: 69.9 % — SIGNIFICANT CHANGE UP (ref 42.2–75.2)
NRBC # BLD: 0 /100 WBCS — SIGNIFICANT CHANGE UP (ref 0–0)
PLATELET # BLD AUTO: 165 K/UL — SIGNIFICANT CHANGE UP (ref 130–400)
POTASSIUM SERPL-MCNC: 5.2 MMOL/L — HIGH (ref 3.5–5)
POTASSIUM SERPL-SCNC: 5.2 MMOL/L — HIGH (ref 3.5–5)
RBC # BLD: 2.43 M/UL — LOW (ref 4.2–5.4)
RBC # FLD: 17 % — HIGH (ref 11.5–14.5)
SODIUM SERPL-SCNC: 137 MMOL/L — SIGNIFICANT CHANGE UP (ref 135–146)
WBC # BLD: 5.37 K/UL — SIGNIFICANT CHANGE UP (ref 4.8–10.8)
WBC # FLD AUTO: 5.37 K/UL — SIGNIFICANT CHANGE UP (ref 4.8–10.8)

## 2019-07-23 PROCEDURE — 99233 SBSQ HOSP IP/OBS HIGH 50: CPT

## 2019-07-23 RX ORDER — LISINOPRIL 2.5 MG/1
1 TABLET ORAL
Qty: 0 | Refills: 0 | DISCHARGE

## 2019-07-23 RX ORDER — METOPROLOL TARTRATE 50 MG
25 TABLET ORAL ONCE
Refills: 0 | Status: COMPLETED | OUTPATIENT
Start: 2019-07-23 | End: 2019-07-23

## 2019-07-23 RX ORDER — VANCOMYCIN HCL 1 G
1 VIAL (EA) INTRAVENOUS
Qty: 24 | Refills: 0
Start: 2019-07-23 | End: 2019-07-28

## 2019-07-23 RX ORDER — ACETAMINOPHEN 500 MG
650 TABLET ORAL ONCE
Refills: 0 | Status: COMPLETED | OUTPATIENT
Start: 2019-07-23 | End: 2019-07-23

## 2019-07-23 RX ORDER — METOPROLOL TARTRATE 50 MG
25 TABLET ORAL
Refills: 0 | Status: DISCONTINUED | OUTPATIENT
Start: 2019-07-23 | End: 2019-07-25

## 2019-07-23 RX ADMIN — HEPARIN SODIUM 5000 UNIT(S): 5000 INJECTION INTRAVENOUS; SUBCUTANEOUS at 22:06

## 2019-07-23 RX ADMIN — Medication 650 MILLIGRAM(S): at 10:50

## 2019-07-23 RX ADMIN — Medication 25 MILLIGRAM(S): at 17:02

## 2019-07-23 RX ADMIN — HEPARIN SODIUM 5000 UNIT(S): 5000 INJECTION INTRAVENOUS; SUBCUTANEOUS at 13:03

## 2019-07-23 RX ADMIN — Medication 3 MILLILITER(S): at 20:47

## 2019-07-23 RX ADMIN — PANTOPRAZOLE SODIUM 40 MILLIGRAM(S): 20 TABLET, DELAYED RELEASE ORAL at 07:39

## 2019-07-23 RX ADMIN — Medication 75 MICROGRAM(S): at 05:26

## 2019-07-23 RX ADMIN — Medication 125 MILLIGRAM(S): at 11:37

## 2019-07-23 RX ADMIN — Medication 650 MILLIGRAM(S): at 11:20

## 2019-07-23 RX ADMIN — AMLODIPINE BESYLATE 2.5 MILLIGRAM(S): 2.5 TABLET ORAL at 05:26

## 2019-07-23 RX ADMIN — Medication 125 MILLIGRAM(S): at 05:26

## 2019-07-23 RX ADMIN — Medication 125 MILLIGRAM(S): at 17:02

## 2019-07-23 RX ADMIN — DULOXETINE HYDROCHLORIDE 60 MILLIGRAM(S): 30 CAPSULE, DELAYED RELEASE ORAL at 11:37

## 2019-07-23 RX ADMIN — AMLODIPINE BESYLATE 2.5 MILLIGRAM(S): 2.5 TABLET ORAL at 17:03

## 2019-07-23 RX ADMIN — Medication 25 MILLIGRAM(S): at 00:01

## 2019-07-23 RX ADMIN — Medication 125 MILLIGRAM(S): at 23:12

## 2019-07-23 RX ADMIN — HEPARIN SODIUM 5000 UNIT(S): 5000 INJECTION INTRAVENOUS; SUBCUTANEOUS at 05:26

## 2019-07-23 RX ADMIN — Medication 25 MILLIGRAM(S): at 09:25

## 2019-07-23 RX ADMIN — Medication 125 MILLIGRAM(S): at 00:27

## 2019-07-23 NOTE — DIETITIAN INITIAL EVALUATION ADULT. - CONTINUE CURRENT NUTRITION CARE PLAN
pt to consume and tolerate >75% of all meals and snacks upon f/u in 3 days. Meals and snacks. rd to monitor diet order, energy intake, body composition, NFPF (PO tolerance)

## 2019-07-23 NOTE — PROGRESS NOTE ADULT - ASSESSMENT
89 F PMH of HTN, OA, COPD on nebs at home (ex-smoker), Colitis, hypothyroid, lung cancer s/p partial lobectomy, bladder ca s/p nephrectomy, CKD and s/p cholecystectomy presents for increased lower quad ab pain secondary to colitis      # Acute drop in Hb, normocytic  - 9.2 > 7.2 > 6.9  - Pt refused blood transfusion last night  - Not sure of baseline as no records in [facility]  - no apparent source of bleeding. Pt hemodynamically stable.  - active type and screen  - peripheral 18G iv line x 2  - would send the hemolytic panel > LDH, haptoglobin      # C. diff colitis/ History of severe diverticulosis (as per GI team, no records in [facility]), Pt follows with Dr Kendall GI  - Dr. Kendall does not come to this hospital, GI team on board  - C. 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 Russ      # T10 and L2 vert compression deformity- unknown chronicity, with chronic L inf pubic ramus and body fx, L sacral ala fx   - pt denies pain      # hyperglycemia, no h/o DM  - f/u FS  - HbA1c ordered      # OA hands, knees (s/p L total knee replacement )  - Tylenol prn      # MANA on CKD (one kidney, s/p left nephrectomy)  - baseline creatt. 1.7-1.9  - cw ivf  - monitor BMP      # hypothyroid- c/w synthroid         DVT PPx: Heparin  GI PPx: PPI  Diet: advanced  Ambulate as tolerated   Full Code   Dispo: home  PT eval pending 89 F PMH of HTN, OA, COPD on nebs at home (ex-smoker), Colitis, hypothyroid, lung cancer s/p partial lobectomy, bladder ca s/p nephrectomy, CKD and s/p cholecystectomy presents for increased lower quad ab pain secondary to colitis      # Acute drop in Hb, normocytic  - 9.2 > 7.2 > 6.9  - Pt refused blood transfusion last night  - Not sure of baseline as no records in [facility]  - no apparent source of bleeding. Pt hemodynamically stable.  - active type and screen  - peripheral 18G iv line x 2  - would send the hemolytic panel > LDH, haptoglobin      # C. diff colitis/ History of severe diverticulosis (as per GI team, no records in [facility]), Pt follows with Dr Kendall GI  - Dr. Kendall does not come to this hospital, GI team on board  - C. 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 Russ      # T10 and L2 vert compression deformity- unknown chronicity, with chronic L inf pubic ramus and body fx, L sacral ala fx   - pt denies pain      # hyperglycemia, no h/o DM  - f/u FS  - HbA1c ordered      # HTN  - cw metoprolol, amlodipine  - Lisinopril on hold due to MANA      # MANA on CKD (one kidney, s/p left nephrectomy)  - baseline creatt. 1.7-1.9  - cw ivf  - monitor BMP      # OA hands, knees (s/p L total knee replacement )  - Tylenol prn      # hypothyroid  - c/w synthroid and metoprolol        DVT PPx: Heparin  GI PPx: PPI  Diet: advanced  Ambulate as tolerated   Full Code   Dispo: home  PT eval pending 89 F PMH of HTN, OA, COPD on nebs at home (ex-smoker), Colitis, hypothyroid, lung cancer s/p partial lobectomy, bladder ca s/p nephrectomy, CKD and s/p cholecystectomy presents for increased lower quad ab pain secondary to colitis      # Acute drop in Hb, normocytic  - 9.2 > 7.2 > 6.9 > 7.4  - Pt refused blood transfusion last night  - Not sure of baseline as no records in [facility]  - no apparent source of bleeding. Pt hemodynamically stable.  - active type and screen  - peripheral 18G iv line x 2  - would send the hemolytic panel > LDH, haptoglobin      # C. diff colitis/ History of severe diverticulosis (as per GI team, no records in [facility]), Pt follows with Dr Kendall GI  - Dr. Kendall does not come to this hospital, GI team on board  - C. 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 Russ      # T10 and L2 vert compression deformity- unknown chronicity, with chronic L inf pubic ramus and body fx, L sacral ala fx   - pt denies pain      # hyperglycemia, no h/o DM  - f/u FS  - HbA1c ordered      # HTN  - cw metoprolol, amlodipine  - Lisinopril on hold due to MANA      # MANA on CKD (one kidney, s/p left nephrectomy)  - baseline creatt. 1.7-1.9  - cw ivf  - monitor BMP      # OA hands, knees (s/p L total knee replacement )  - Tylenol prn      # hypothyroid  - c/w synthroid and metoprolol        DVT PPx: Heparin  GI PPx: PPI  Diet: advanced  Ambulate as tolerated   Full Code   Dispo: home  PT eval pending

## 2019-07-23 NOTE — DIETITIAN INITIAL EVALUATION ADULT. - ADD RECOMMEND
When medically feasible to adv from clear diet, start SOFT and then add LOW PROTEIN, LOW SODIUM, and mechanical dysphagia 3 cut up with thin.

## 2019-07-23 NOTE — DIETITIAN INITIAL EVALUATION ADULT. - REASON INDICATOR FOR ASSESSMENT
INAPPROPRIATE CONSULT for pressure ulcer - pt is alert and oriented on the chair, c/o being on clear since admission and happy to know she is getting SOFT diet later today. Excoriation, NO pressure injury noted by RN. YUVAL 7/22 per EMR. No edema. Requires full dentures to eat but the bottom section isn't fitting well.

## 2019-07-23 NOTE — CHART NOTE - NSCHARTNOTEFT_GEN_A_CORE
Patient's hgb was noted to be 6.9. Consent for transfusion was taken and patient agreed to transfusion. Was told to speak with her son, Jm over the phone. Spoke with son Jm Patient's hgb was noted to be 6.9. Consent for transfusion was taken and patient agreed to transfusion. Was told to speak with her son, Jm over the phone. Spoke with son Jm who after going through adverse reactions stated he would like to wait for a repeat of CBC tomorrow and decide then. Patient and son were explained the risks and benefits of getting transfusion but nevertheless patient also agreed to wait for a repeat of CBC tomorrow. No signs of active bleeding at the time and patient was stable and AAOX3. Patient's hgb was noted to be 6.9. Consent for transfusion was attempted to be taken and patient agreed to transfusion. Was told to speak with her son, Jm over the phone. Spoke with son Jm who after going through adverse reactions stated he would like to wait for a repeat of CBC tomorrow and decide then. Patient and son were explained the risks and benefits of getting transfusion but nevertheless patient also eventually agreed to wait for a repeat of CBC tomorrow. No signs of active bleeding at the time and patient was stable and AAOX3.

## 2019-07-23 NOTE — DIETITIAN INITIAL EVALUATION ADULT. - OTHER INFO
p/w worsening diarrhea and abd pain for 2 days. a/w n/v. monitoring h/h. c diff colitis/ hx of seizure diverticulosis. c/w abx. IVF. MANA on CKD (one kidney s/p L nephrectomy)

## 2019-07-23 NOTE — PROGRESS NOTE ADULT - ASSESSMENT
89 F PMH of HTN, OA, COPD on nebs at home (ex-smoker), Colitis, hypothyroid, lung cancer s/p partial lobectomy, bladder ca s/p left nephrectomy, CKD and s/p cholecystectomy present c/o 10/10 lower ab pain associated with nausea/vomiting (1 episode) and diarrhea,     #C.diff colitis-resolving  - c/w vancomycin  - advance diet to soft diet    #Acute kidney injury on CKD stage 3-resolving  - lisinopril held  - monitor BUN/creat    # Anemia  - check iron profile, b12, folate  - monitor cbc    # Hypertension  - pt restarted on metoprolol, norvasc    #Hypothyroidism  - c/w Synthroid    # COPD stable  - c/w duoneb    # 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 Russ    # Infrarenal AAA- 3.6 cm seen on CT scan   -outpt F/u with vascular    #DVT prophylaxis    #PT eval    # Full code    # Pending: anticipate for discharge in Am  #Discussed with patient   # Disposition: Home

## 2019-07-23 NOTE — PROGRESS NOTE ADULT - SUBJECTIVE AND OBJECTIVE BOX
SUBJECTIVE:  Patient is a 89y old Female who presents with a chief complaint of worsening diarrhea and abdominal pain for 2 days duration (22 Jul 2019 09:20)  Currently admitted to medicine with the primary diagnosis of Acute on chronic kidney failure/C. diff colitis       Today is hospital day 2d. This morning she is resting comfortably in bed       PAST MEDICAL & SURGICAL HISTORY  COPD with emphysema  Colitis: with diarrhea  Other type of osteoarthritis  HTN (hypertension)  Cancer: Bladder 1981  COPD (chronic obstructive pulmonary disease)  H/O breast surgery: 25% calcification removed  Malignant neoplasm of urinary bladder, unspecified site: Removal x 3  History of nephrectomy: Left  History of total left knee replacement (TKR)  History of hip replacement, total, right    SOCIAL HISTORY:  Negative for smoking/alcohol/drug use.     ALLERGIES:  Contrast Dye (Hives; Rash)  No Known Drug Allergies    MEDICATIONS:  STANDING MEDICATIONS  ALBUTerol/ipratropium for Nebulization 3 milliLiter(s) Nebulizer every 6 hours  amLODIPine   Tablet 2.5 milliGRAM(s) Oral <User Schedule>  chlorhexidine 4% Liquid 1 Application(s) Topical <User Schedule>  DULoxetine 60 milliGRAM(s) Oral daily  heparin  Injectable 5000 Unit(s) SubCutaneous every 8 hours  levothyroxine 75 MICROGram(s) Oral daily  pantoprazole    Tablet 40 milliGRAM(s) Oral before breakfast  sodium chloride 0.9%. 1000 milliLiter(s) IV Continuous <Continuous>  vancomycin    Solution 125 milliGRAM(s) Oral every 6 hours    PRN MEDICATIONS    Home Medications:  amLODIPine 2.5 mg oral tablet: 1 tab(s) orally once a day (10 Oct 2018 10:29)  anti diarrheal: 2 milligram(s) orally once a day (10 Oct 2018 10:29)  Donnatal oral tablet: 1 tab(s) orally 2 times a day, As Needed (10 Oct 2018 10:29)  DULoxetine 60 mg oral delayed release capsule: 1 cap(s) orally once a day (10 Oct 2018 10:29)  levothyroxine 75 mcg (0.075 mg) oral tablet: 1 tab(s) orally once a day (10 Oct 2018 10:29)  lisinopril 20 mg oral tablet: 1 tab(s) orally once a day (10 Oct 2018 10:29)  metoprolol succinate 25 mg oral tablet, extended release: 1 tab(s) orally 2 times a day (10 Oct 2018 10:29)    Vital Signs Last 24 Hrs  T(C): 36.2 (23 Jul 2019 05:21), Max: 36.7 (22 Jul 2019 22:06)  T(F): 97.2 (23 Jul 2019 05:21), Max: 98.1 (22 Jul 2019 22:06)  HR: 65 (23 Jul 2019 05:21) (65 - 79)  BP: 172/71 (23 Jul 2019 05:21) (144/66 - 177/74)  BP(mean): --  RR: 18 (23 Jul 2019 05:21) (18 - 18)  SpO2: 96% (22 Jul 2019 22:06) (96% - 96%)      LABS:                        6.9    7.21  )-----------( 168      ( 22 Jul 2019 21:39 )             21.8     07-22    138  |  109  |  45<H>  ----------------------------<  100<H>  5.3<H>   |  18  |  2.2<H>    Ca    9.2      22 Jul 2019 08:18          PHYSICAL EXAM:  GEN: No acute distress  LUNGS: Clear to auscultation bilaterally   HEART: S1/S2 present. RRR.   ABD: Soft, non-tender, non-distended. Bowel sounds present  NEURO: AAOX3

## 2019-07-23 NOTE — PHYSICAL THERAPY INITIAL EVALUATION ADULT - GENERAL OBSERVATIONS, REHAB EVAL
Pt was rec'd in b/s chair. Pt reported that she felt weak but was agreeable to participate in PT. Time in: 11:20 Time out: 11:45. Before PT BP: 109/64, HR:66

## 2019-07-23 NOTE — DIETITIAN INITIAL EVALUATION ADULT. - ENERGY NEEDS
3500-5011 kcal/day (MSJ x 1.2-1.3)  63-69 g/day (1.0-1.1 g/kg of ABW) - one kidney, renal function poor  1ml/kcal or per LIP

## 2019-07-23 NOTE — DIETITIAN INITIAL EVALUATION ADULT. - DIET TYPE
AT HOME pt is a good eater, no vitamin/supplement, dislike ensure shakes because she can eat. Also watches for salt due to high BP. Drinks electrolyte sport drinks in the morning with medications.

## 2019-07-24 ENCOUNTER — TRANSCRIPTION ENCOUNTER (OUTPATIENT)
Age: 84
End: 2019-07-24

## 2019-07-24 LAB
ANION GAP SERPL CALC-SCNC: 10 MMOL/L — SIGNIFICANT CHANGE UP (ref 7–14)
BASOPHILS # BLD AUTO: 0.04 K/UL — SIGNIFICANT CHANGE UP (ref 0–0.2)
BASOPHILS NFR BLD AUTO: 0.8 % — SIGNIFICANT CHANGE UP (ref 0–1)
BUN SERPL-MCNC: 33 MG/DL — HIGH (ref 10–20)
CALCIUM SERPL-MCNC: 9.7 MG/DL — SIGNIFICANT CHANGE UP (ref 8.5–10.1)
CHLORIDE SERPL-SCNC: 108 MMOL/L — SIGNIFICANT CHANGE UP (ref 98–110)
CO2 SERPL-SCNC: 20 MMOL/L — SIGNIFICANT CHANGE UP (ref 17–32)
CREAT SERPL-MCNC: 1.9 MG/DL — HIGH (ref 0.7–1.5)
EOSINOPHIL # BLD AUTO: 0.14 K/UL — SIGNIFICANT CHANGE UP (ref 0–0.7)
EOSINOPHIL NFR BLD AUTO: 2.9 % — SIGNIFICANT CHANGE UP (ref 0–8)
GLUCOSE BLDC GLUCOMTR-MCNC: 106 MG/DL — HIGH (ref 70–99)
GLUCOSE SERPL-MCNC: 109 MG/DL — HIGH (ref 70–99)
HCT VFR BLD CALC: 25.5 % — LOW (ref 37–47)
HGB BLD-MCNC: 8.1 G/DL — LOW (ref 12–16)
IMM GRANULOCYTES NFR BLD AUTO: 0.4 % — HIGH (ref 0.1–0.3)
LYMPHOCYTES # BLD AUTO: 0.93 K/UL — LOW (ref 1.2–3.4)
LYMPHOCYTES # BLD AUTO: 19.1 % — LOW (ref 20.5–51.1)
MCHC RBC-ENTMCNC: 29.9 PG — SIGNIFICANT CHANGE UP (ref 27–31)
MCHC RBC-ENTMCNC: 31.8 G/DL — LOW (ref 32–37)
MCV RBC AUTO: 94.1 FL — SIGNIFICANT CHANGE UP (ref 81–99)
MONOCYTES # BLD AUTO: 0.56 K/UL — SIGNIFICANT CHANGE UP (ref 0.1–0.6)
MONOCYTES NFR BLD AUTO: 11.5 % — HIGH (ref 1.7–9.3)
NEUTROPHILS # BLD AUTO: 3.19 K/UL — SIGNIFICANT CHANGE UP (ref 1.4–6.5)
NEUTROPHILS NFR BLD AUTO: 65.3 % — SIGNIFICANT CHANGE UP (ref 42.2–75.2)
NRBC # BLD: 0 /100 WBCS — SIGNIFICANT CHANGE UP (ref 0–0)
PLATELET # BLD AUTO: 191 K/UL — SIGNIFICANT CHANGE UP (ref 130–400)
POTASSIUM SERPL-MCNC: 5.2 MMOL/L — HIGH (ref 3.5–5)
POTASSIUM SERPL-SCNC: 5.2 MMOL/L — HIGH (ref 3.5–5)
RBC # BLD: 2.71 M/UL — LOW (ref 4.2–5.4)
RBC # FLD: 16.6 % — HIGH (ref 11.5–14.5)
SODIUM SERPL-SCNC: 138 MMOL/L — SIGNIFICANT CHANGE UP (ref 135–146)
WBC # BLD: 4.88 K/UL — SIGNIFICANT CHANGE UP (ref 4.8–10.8)
WBC # FLD AUTO: 4.88 K/UL — SIGNIFICANT CHANGE UP (ref 4.8–10.8)

## 2019-07-24 PROCEDURE — 99239 HOSP IP/OBS DSCHRG MGMT >30: CPT

## 2019-07-24 RX ORDER — METOPROLOL TARTRATE 50 MG
1 TABLET ORAL
Qty: 0 | Refills: 0 | DISCHARGE

## 2019-07-24 RX ORDER — AMLODIPINE BESYLATE 2.5 MG/1
1 TABLET ORAL
Qty: 0 | Refills: 0 | DISCHARGE

## 2019-07-24 RX ORDER — AMLODIPINE BESYLATE 2.5 MG/1
2.5 TABLET ORAL ONCE
Refills: 0 | Status: COMPLETED | OUTPATIENT
Start: 2019-07-24 | End: 2019-07-24

## 2019-07-24 RX ADMIN — AMLODIPINE BESYLATE 2.5 MILLIGRAM(S): 2.5 TABLET ORAL at 15:55

## 2019-07-24 RX ADMIN — Medication 75 MICROGRAM(S): at 05:57

## 2019-07-24 RX ADMIN — Medication 125 MILLIGRAM(S): at 05:57

## 2019-07-24 RX ADMIN — Medication 3 MILLILITER(S): at 10:02

## 2019-07-24 RX ADMIN — Medication 25 MILLIGRAM(S): at 05:57

## 2019-07-24 RX ADMIN — Medication 125 MILLIGRAM(S): at 23:35

## 2019-07-24 RX ADMIN — CHLORHEXIDINE GLUCONATE 1 APPLICATION(S): 213 SOLUTION TOPICAL at 09:39

## 2019-07-24 RX ADMIN — Medication 125 MILLIGRAM(S): at 17:51

## 2019-07-24 RX ADMIN — DULOXETINE HYDROCHLORIDE 60 MILLIGRAM(S): 30 CAPSULE, DELAYED RELEASE ORAL at 11:44

## 2019-07-24 RX ADMIN — HEPARIN SODIUM 5000 UNIT(S): 5000 INJECTION INTRAVENOUS; SUBCUTANEOUS at 15:43

## 2019-07-24 RX ADMIN — Medication 125 MILLIGRAM(S): at 11:45

## 2019-07-24 RX ADMIN — HEPARIN SODIUM 5000 UNIT(S): 5000 INJECTION INTRAVENOUS; SUBCUTANEOUS at 05:57

## 2019-07-24 RX ADMIN — HEPARIN SODIUM 5000 UNIT(S): 5000 INJECTION INTRAVENOUS; SUBCUTANEOUS at 23:34

## 2019-07-24 RX ADMIN — AMLODIPINE BESYLATE 2.5 MILLIGRAM(S): 2.5 TABLET ORAL at 18:03

## 2019-07-24 RX ADMIN — AMLODIPINE BESYLATE 2.5 MILLIGRAM(S): 2.5 TABLET ORAL at 05:57

## 2019-07-24 RX ADMIN — PANTOPRAZOLE SODIUM 40 MILLIGRAM(S): 20 TABLET, DELAYED RELEASE ORAL at 08:00

## 2019-07-24 RX ADMIN — Medication 25 MILLIGRAM(S): at 17:51

## 2019-07-24 NOTE — DISCHARGE NOTE PROVIDER - PROVIDER TOKENS
FREE:[LAST:[Enoch],FIRST:[Linda],PHONE:[phone]:[phone]   [phone]    -]],PROVIDER:[TOKEN:[[id number]],FOLLOWUP:[1 week]]

## 2019-07-24 NOTE — DISCHARGE NOTE PROVIDER - NSDCCPTREATMENT_GEN_ALL_CORE_FT
PRINCIPAL PROCEDURE  Procedure: CT abdomen and pelvis  Findings and Treatment: IMPRESSION:   Findings compatible with left-sided colitis.  Age indeterminant T10 and L2 vertebra.  Aneurysmal dilatation of the infrarenal abdominal aorta        SECONDARY PROCEDURE  Procedure: Complete ultrasound of abdomen  Findings and Treatment: IMPRESSION:  Status post left nephrectomy.  Increase in cortical echogenicity of the right kidney suggestive of   medical renal disease.

## 2019-07-24 NOTE — DISCHARGE NOTE PROVIDER - NSDCCPCAREPLAN_GEN_ALL_CORE_FT
PRINCIPAL DISCHARGE DIAGNOSIS  Diagnosis: Clostridium difficile colitis  Assessment and Plan of Treatment: Pt had C. diff colitis.  She was started on oral vancomycin.  advised to take medications as prescribed.  maintain good personal and hand hygiene.  If develop abdominal pain, diarrhea, fever, please visit PMD or come to ED.  Follow up with Dr Kendall within 1 week of discharge      SECONDARY DISCHARGE DIAGNOSES  Diagnosis: Abdominal aortic aneurysm  Assessment and Plan of Treatment: CT abdomen showed dilation of abdominal aorta of 3.6 cm  It develops in pt with prior history of smoking mainly.  Pt advised to follow up with vascular doctors as outpatients.  Check BP regularly at home.  avoid smoking cigarettes.  If develop abdominal pain, sweating, high BP, please visit ED    Diagnosis: Acute kidney injury  Assessment and Plan of Treatment: Pt developed acute kidney injury likely secondary to dehydration.  She received iv fluids and it has resolved now.  stay hydrated.  Lisinopril is being held due to acute kidney injury.  Please follow up with PMD in1 week to check BP and vital signs and confirm if it is to be restarted again PRINCIPAL DISCHARGE DIAGNOSIS  Diagnosis: Clostridium difficile colitis  Assessment and Plan of Treatment: Pt had C. diff colitis.  She was started on oral vancomycin.  advised to take medications as prescribed.  maintain good personal and hand hygiene.  If develop abdominal pain, diarrhea, fever, please visit PMD or come to ED.  Follow up with Dr Kendall within 1 week of discharge      SECONDARY DISCHARGE DIAGNOSES  Diagnosis: Hypertension  Assessment and Plan of Treatment: For hypertension, amlodipine has been increased to 5 mg daily and Lisinopril is being held due to increased potassium and acuet kidney injury.  Dr Be made aware of the changes in the medication regimen.  Advised to follow up with DR Be as outpatient.  Take low salt, low cholesterol and low potassium diet    Diagnosis: Abdominal aortic aneurysm  Assessment and Plan of Treatment: CT abdomen showed dilation of abdominal aorta of 3.6 cm  It develops in pt with prior history of smoking mainly.  Pt advised to follow up with vascular doctors as outpatients.  Check BP regularly at home.  avoid smoking cigarettes.  If develop abdominal pain, sweating, high BP, please visit ED    Diagnosis: Acute kidney injury  Assessment and Plan of Treatment: Pt developed acute kidney injury likely secondary to dehydration.  She received iv fluids and it has resolved now.  stay hydrated.  Lisinopril is being held due to acute kidney injury.  Please follow up with PMD in1 week to check BP and vital signs and confirm if it is to be restarted again.

## 2019-07-24 NOTE — PROGRESS NOTE ADULT - ASSESSMENT
89 F PMH of HTN, OA, COPD on nebs at home (ex-smoker), Colitis, hypothyroid, lung cancer s/p partial lobectomy, bladder ca s/p left nephrectomy, CKD and s/p cholecystectomy present c/o 10/10 lower ab pain associated with nausea/vomiting (1 episode) and diarrhea,     #C.diff colitis-resolving  - c/w vancomycin  - tolerating advance diet     #Acute kidney injury on CKD stage 3, hyperkalemia-resolving  - lisinopril held  - monitor BUN/creat    # Anemia  - HB/HCT stable    # Hypertension  - c/w metoprolol  -  norvasc increased to 5 mg daily    #Hypothyroidism  - c/w Synthroid    # COPD stable  - c/w duoneb    # 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 Russ    # Infrarenal AAA- 3.6 cm seen on CT scan   -outpt F/u with vascular    #DVT prophylaxis    # Full code    # Pending: anticipate for discharge in Am  #Discussed with patient   # Disposition: Home

## 2019-07-24 NOTE — DISCHARGE NOTE PROVIDER - HOSPITAL COURSE
89 F PMH of HTN, OA, COPD on nebs at home (ex-smoker), Colitis, hypothyroid, lung cancer s/p partial lobectomy, bladder ca s/p left nephrectomy, right breast cancer s/p lumpectomy, CKD and s/p cholecystectomy presented c/o 10/10 lower abdominal pain associated with nausea/vomiting (1 episode) and diarrhea, since last night. The pt has a h/o of colitis, she follows with Dr. Kendall (GI) every other month and she does have abdominal pain at baseline. While in the ED, the pt was given 2L IVF, (LR and NS), Cipro and flagyl, Zofran.                     # C. diff colitis/ History of severe diverticulosis     - Pt follows with Dr Kendall GI    - C. 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 Russ        # T10 and L2 vert compression deformity- unknown chronicity, with chronic L inf pubic ramus and body fx, L sacral ala fx     - pt asymptomatic            # HTN    - cw metoprolol, amlodipine    - Lisinopril on hold due to MANA            # MANA on CKD (one kidney, s/p left nephrectomy): resolved    - baseline creat. 1.7-1.9    - Lisinopril being held            # OA hands, knees (s/p L total knee replacement )    - Tylenol prn            # hypothyroid    - c/w synthroid and metoprolol 89 F PMH of HTN, OA, COPD on nebs at home (ex-smoker), Colitis, hypothyroid, lung cancer s/p partial lobectomy, bladder ca s/p left nephrectomy, right breast cancer s/p lumpectomy, CKD and s/p cholecystectomy presented c/o 10/10 lower abdominal pain associated with nausea/vomiting (1 episode) and diarrhea, since last night. The pt has a h/o of colitis, she follows with Dr. Kendall (GI) every other month and she does have abdominal pain at baseline. While in the ED, the pt was given 2L IVF, (LR and NS), Cipro and flagyl, Zofran.                     # C. diff colitis/ History of severe diverticulosis     - Pt follows with Dr Kendall GI    - C. 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 Russ        # T10 and L2 vert compression deformity- unknown chronicity, with chronic L inf pubic ramus and body fx, L sacral ala fx     - pt asymptomatic        # OA hands, knees (s/p L total knee replacement )    - Tylenol prn            # hypothyroid    - c/w synthroid 89 F PMH of HTN, OA, COPD on nebs at home (ex-smoker), Colitis, hypothyroid, lung cancer s/p partial lobectomy, bladder ca s/p left nephrectomy, right breast cancer s/p lumpectomy, CKD and s/p cholecystectomy presented c/o 10/10 lower abdominal pain associated with nausea/vomiting (1 episode) and diarrhea, since last night. The pt has a h/o of colitis, she follows with Dr. Kendall (GI) every other month and she does have abdominal pain at baseline. While in the ED, the pt was given 2L IVF, (LR and NS), Cipro and flagyl, Zofran.                     # C. diff colitis/ History of severe diverticulosis     - Pt follows with Dr Kendall GI    - C. diff positive (first episode per GI)    - CT a/p demonstrated L-sided colitis and diverticulosis    - c/w po vanco    - Pt afebrile, No Leukocytosis        #Acute kidney injury on CKD stage 3-resolving    - lisinopril held    - recheck BMP in 1 week    -follow up with Dr Be as outpt. 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 Russ        # T10 and L2 vert compression deformity- unknown chronicity, with chronic L inf pubic ramus and body fx, L sacral ala fx     - pt asymptomatic        # OA hands, knees (s/p L total knee replacement )    - Tylenol prn            # hypothyroid    - c/w synthroid 89 F PMH of HTN, OA, COPD on nebs at home (ex-smoker), Colitis, hypothyroid, lung cancer s/p partial lobectomy, bladder ca s/p left nephrectomy, right breast cancer s/p lumpectomy, CKD and s/p cholecystectomy presented c/o 10/10 lower abdominal pain associated with nausea/vomiting (1 episode) and diarrhea, since last night. The pt has a h/o of colitis, she follows with Dr. Kendall (GI) every other month and she does have abdominal pain at baseline. While in the ED, the pt was given 2L IVF, (LR and NS), Cipro and flagyl, Zofran.                     # C. diff colitis/ History of severe diverticulosis     - Pt follows with Dr Kendall GI    - C. diff positive (first episode per GI)    - CT a/p demonstrated L-sided colitis and diverticulosis    - c/w po vanco    - Pt afebrile, No Leukocytosis        #Acute kidney injury on CKD stage 3-resolving    - lisinopril held    - recheck BMP in 1 week    -follow up with Dr Be as outpt. she was called and made aware of the changes        # Hypertension    - c/w  metoprolol, increase norvasc to 5 mg daily    - lisinopril held sec to hyperkalemia and MANA    - Pt BP on discharge was 191/77 mmhg. Talked to Dr Be, she is ok discharging the pt on metoprolol and amlodipine, said she would see the pt and add medications accordingly.            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 Russ        # T10 and L2 vert compression deformity- unknown chronicity, with chronic L inf pubic ramus and body fx, L sacral ala fx     - pt asymptomatic        # OA hands, knees (s/p L total knee replacement )    - Tylenol prn            # hypothyroid    - c/w synthroid 89 F PMH of HTN, OA, COPD on nebs at home (ex-smoker), Colitis, hypothyroid, lung cancer s/p partial lobectomy, bladder ca s/p left nephrectomy, right breast cancer s/p lumpectomy, CKD and s/p cholecystectomy presented c/o 10/10 lower abdominal pain associated with nausea/vomiting (1 episode) and diarrhea, since last night. The pt has a h/o of colitis, she follows with Dr. Kendall (GI) every other month and she does have abdominal pain at baseline. While in the ED, the pt was given 2L IVF, (LR and NS), Cipro and flagyl, Zofran.                     # C. diff colitis/ History of severe diverticulosis     - Pt follows with Dr Kendall GI    - C. diff positive (first episode per GI)    - CT a/p demonstrated L-sided colitis and diverticulosis    - c/w po vanco    - Pt afebrile, No Leukocytosis        #Acute kidney injury on CKD stage 3-resolving    - lisinopril held    - recheck BMP in 1 week    -follow up with Dr Be as outpt. she was called and made aware of the changes        # Hypertension    - c/w  metoprolol, increase norvasc to 5 mg q12    - lisinopril held sec to hyperkalemia and MANA    - Pt BP on discharge was 191/77 mmhg. Talked to Dr Be, she is ok discharging the pt on metoprolol and amlodipine, said she would see the pt and add medications accordingly.            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 Russ        # T10 and L2 vert compression deformity- unknown chronicity, with chronic L inf pubic ramus and body fx, L sacral ala fx     - pt asymptomatic        # OA hands, knees (s/p L total knee replacement )    - Tylenol prn            # hypothyroid    - c/w synthroid

## 2019-07-24 NOTE — DISCHARGE NOTE PROVIDER - CARE PROVIDER_API CALL
Linda Kendall  Phone: [phone]  Fax: (   )    -  Follow Up Time:     Arjun Luu)  Surgery; Vascular Surgery  [address] [place] [address], [place] [address]  Phone: [phone]  Fax: [phone]  Follow Up Time: 1 week

## 2019-07-24 NOTE — DISCHARGE NOTE NURSING/CASE MANAGEMENT/SOCIAL WORK - NSDCDPATPORTLINK_GEN_ALL_CORE
You can access the [facility] Patient Portal, offered by [facility], by registering with the following website: [email or web address]

## 2019-07-24 NOTE — PROGRESS NOTE ADULT - REASON FOR ADMISSION
worsening diarrhea and abdominal pain for 2 days duration

## 2019-07-25 ENCOUNTER — INBOUND DOCUMENT (OUTPATIENT)
Age: 84
End: 2019-07-25

## 2019-07-25 VITALS — DIASTOLIC BLOOD PRESSURE: 77 MMHG | SYSTOLIC BLOOD PRESSURE: 191 MMHG | HEART RATE: 65 BPM

## 2019-07-25 LAB — GLUCOSE BLDC GLUCOMTR-MCNC: 113 MG/DL — HIGH (ref 70–99)

## 2019-07-25 PROCEDURE — 99239 HOSP IP/OBS DSCHRG MGMT >30: CPT

## 2019-07-25 RX ORDER — LISINOPRIL 2.5 MG/1
20 TABLET ORAL DAILY
Refills: 0 | Status: DISCONTINUED | OUTPATIENT
Start: 2019-07-25 | End: 2019-07-25

## 2019-07-25 RX ORDER — AMLODIPINE BESYLATE 2.5 MG/1
1 TABLET ORAL
Qty: 60 | Refills: 0
Start: 2019-07-25 | End: 2019-08-23

## 2019-07-25 RX ORDER — AMLODIPINE BESYLATE 2.5 MG/1
5 TABLET ORAL DAILY
Refills: 0 | Status: DISCONTINUED | OUTPATIENT
Start: 2019-07-26 | End: 2019-07-25

## 2019-07-25 RX ORDER — AMLODIPINE BESYLATE 2.5 MG/1
2.5 TABLET ORAL ONCE
Refills: 0 | Status: COMPLETED | OUTPATIENT
Start: 2019-07-25 | End: 2019-07-25

## 2019-07-25 RX ORDER — AMLODIPINE BESYLATE 2.5 MG/1
5 TABLET ORAL ONCE
Refills: 0 | Status: COMPLETED | OUTPATIENT
Start: 2019-07-25 | End: 2019-07-25

## 2019-07-25 RX ORDER — AMLODIPINE BESYLATE 2.5 MG/1
2 TABLET ORAL
Qty: 0 | Refills: 0 | DISCHARGE

## 2019-07-25 RX ORDER — AMLODIPINE BESYLATE 2.5 MG/1
1 TABLET ORAL
Qty: 30 | Refills: 0
Start: 2019-07-25 | End: 2019-08-23

## 2019-07-25 RX ADMIN — Medication 75 MICROGRAM(S): at 05:57

## 2019-07-25 RX ADMIN — AMLODIPINE BESYLATE 2.5 MILLIGRAM(S): 2.5 TABLET ORAL at 09:44

## 2019-07-25 RX ADMIN — DULOXETINE HYDROCHLORIDE 60 MILLIGRAM(S): 30 CAPSULE, DELAYED RELEASE ORAL at 11:16

## 2019-07-25 RX ADMIN — AMLODIPINE BESYLATE 5 MILLIGRAM(S): 2.5 TABLET ORAL at 10:52

## 2019-07-25 RX ADMIN — AMLODIPINE BESYLATE 2.5 MILLIGRAM(S): 2.5 TABLET ORAL at 05:57

## 2019-07-25 RX ADMIN — PANTOPRAZOLE SODIUM 40 MILLIGRAM(S): 20 TABLET, DELAYED RELEASE ORAL at 07:57

## 2019-07-25 RX ADMIN — Medication 125 MILLIGRAM(S): at 05:57

## 2019-07-25 RX ADMIN — Medication 25 MILLIGRAM(S): at 05:57

## 2019-07-25 RX ADMIN — HEPARIN SODIUM 5000 UNIT(S): 5000 INJECTION INTRAVENOUS; SUBCUTANEOUS at 05:57

## 2019-07-25 RX ADMIN — Medication 125 MILLIGRAM(S): at 11:17

## 2019-07-31 DIAGNOSIS — M48.54XA COLLAPSED VERTEBRA, NOT ELSEWHERE CLASSIFIED, THORACIC REGION, INITIAL ENCOUNTER FOR FRACTURE: ICD-10-CM

## 2019-07-31 DIAGNOSIS — Z85.51 PERSONAL HISTORY OF MALIGNANT NEOPLASM OF BLADDER: ICD-10-CM

## 2019-07-31 DIAGNOSIS — N18.4 CHRONIC KIDNEY DISEASE, STAGE 4 (SEVERE): ICD-10-CM

## 2019-07-31 DIAGNOSIS — E83.42 HYPOMAGNESEMIA: ICD-10-CM

## 2019-07-31 DIAGNOSIS — R73.9 HYPERGLYCEMIA, UNSPECIFIED: ICD-10-CM

## 2019-07-31 DIAGNOSIS — M17.10 UNILATERAL PRIMARY OSTEOARTHRITIS, UNSPECIFIED KNEE: ICD-10-CM

## 2019-07-31 DIAGNOSIS — E03.9 HYPOTHYROIDISM, UNSPECIFIED: ICD-10-CM

## 2019-07-31 DIAGNOSIS — I12.9 HYPERTENSIVE CHRONIC KIDNEY DISEASE WITH STAGE 1 THROUGH STAGE 4 CHRONIC KIDNEY DISEASE, OR UNSPECIFIED CHRONIC KIDNEY DISEASE: ICD-10-CM

## 2019-07-31 DIAGNOSIS — N17.9 ACUTE KIDNEY FAILURE, UNSPECIFIED: ICD-10-CM

## 2019-07-31 DIAGNOSIS — Z87.891 PERSONAL HISTORY OF NICOTINE DEPENDENCE: ICD-10-CM

## 2019-07-31 DIAGNOSIS — A04.72 ENTEROCOLITIS DUE TO CLOSTRIDIUM DIFFICILE, NOT SPECIFIED AS RECURRENT: ICD-10-CM

## 2019-07-31 DIAGNOSIS — Z85.118 PERSONAL HISTORY OF OTHER MALIGNANT NEOPLASM OF BRONCHUS AND LUNG: ICD-10-CM

## 2019-07-31 DIAGNOSIS — I72.2 ANEURYSM OF RENAL ARTERY: ICD-10-CM

## 2019-07-31 DIAGNOSIS — R71.0 PRECIPITOUS DROP IN HEMATOCRIT: ICD-10-CM

## 2019-07-31 DIAGNOSIS — R10.9 UNSPECIFIED ABDOMINAL PAIN: ICD-10-CM

## 2019-11-19 ENCOUNTER — EMERGENCY (EMERGENCY)
Facility: HOSPITAL | Age: 84
LOS: 0 days | Discharge: HOME | End: 2019-11-19
Attending: EMERGENCY MEDICINE | Admitting: EMERGENCY MEDICINE
Payer: MEDICARE

## 2019-11-19 VITALS
WEIGHT: 134.92 LBS | TEMPERATURE: 96 F | SYSTOLIC BLOOD PRESSURE: 184 MMHG | HEIGHT: 63 IN | HEART RATE: 60 BPM | OXYGEN SATURATION: 98 % | RESPIRATION RATE: 19 BRPM | DIASTOLIC BLOOD PRESSURE: 74 MMHG

## 2019-11-19 VITALS
DIASTOLIC BLOOD PRESSURE: 61 MMHG | HEART RATE: 60 BPM | TEMPERATURE: 97 F | RESPIRATION RATE: 20 BRPM | OXYGEN SATURATION: 98 % | SYSTOLIC BLOOD PRESSURE: 134 MMHG

## 2019-11-19 DIAGNOSIS — C67.9 MALIGNANT NEOPLASM OF BLADDER, UNSPECIFIED: Chronic | ICD-10-CM

## 2019-11-19 DIAGNOSIS — Z98.890 OTHER SPECIFIED POSTPROCEDURAL STATES: Chronic | ICD-10-CM

## 2019-11-19 DIAGNOSIS — Z96.652 PRESENCE OF LEFT ARTIFICIAL KNEE JOINT: Chronic | ICD-10-CM

## 2019-11-19 DIAGNOSIS — Z90.5 ACQUIRED ABSENCE OF KIDNEY: Chronic | ICD-10-CM

## 2019-11-19 DIAGNOSIS — Z88.2 ALLERGY STATUS TO SULFONAMIDES: ICD-10-CM

## 2019-11-19 DIAGNOSIS — Z91.041 RADIOGRAPHIC DYE ALLERGY STATUS: ICD-10-CM

## 2019-11-19 DIAGNOSIS — Z87.891 PERSONAL HISTORY OF NICOTINE DEPENDENCE: ICD-10-CM

## 2019-11-19 DIAGNOSIS — R19.7 DIARRHEA, UNSPECIFIED: ICD-10-CM

## 2019-11-19 DIAGNOSIS — Z96.641 PRESENCE OF RIGHT ARTIFICIAL HIP JOINT: Chronic | ICD-10-CM

## 2019-11-19 PROBLEM — J43.9 EMPHYSEMA, UNSPECIFIED: Chronic | Status: ACTIVE | Noted: 2019-07-21

## 2019-11-19 LAB
ALBUMIN SERPL ELPH-MCNC: 4.5 G/DL — SIGNIFICANT CHANGE UP (ref 3.5–5.2)
ALP SERPL-CCNC: 101 U/L — SIGNIFICANT CHANGE UP (ref 30–115)
ALT FLD-CCNC: 7 U/L — SIGNIFICANT CHANGE UP (ref 0–41)
ANION GAP SERPL CALC-SCNC: 16 MMOL/L — HIGH (ref 7–14)
AST SERPL-CCNC: 14 U/L — SIGNIFICANT CHANGE UP (ref 0–41)
BASOPHILS # BLD AUTO: 0.02 K/UL — SIGNIFICANT CHANGE UP (ref 0–0.2)
BASOPHILS NFR BLD AUTO: 0.2 % — SIGNIFICANT CHANGE UP (ref 0–1)
BILIRUB SERPL-MCNC: 0.3 MG/DL — SIGNIFICANT CHANGE UP (ref 0.2–1.2)
BUN SERPL-MCNC: 64 MG/DL — CRITICAL HIGH (ref 10–20)
C DIFF BY PCR RESULT: NEGATIVE — SIGNIFICANT CHANGE UP
C DIFF TOX GENS STL QL NAA+PROBE: SIGNIFICANT CHANGE UP
CALCIUM SERPL-MCNC: 10 MG/DL — SIGNIFICANT CHANGE UP (ref 8.5–10.1)
CHLORIDE SERPL-SCNC: 103 MMOL/L — SIGNIFICANT CHANGE UP (ref 98–110)
CO2 SERPL-SCNC: 19 MMOL/L — SIGNIFICANT CHANGE UP (ref 17–32)
CREAT SERPL-MCNC: 1.8 MG/DL — HIGH (ref 0.7–1.5)
EOSINOPHIL # BLD AUTO: 0.19 K/UL — SIGNIFICANT CHANGE UP (ref 0–0.7)
EOSINOPHIL NFR BLD AUTO: 2 % — SIGNIFICANT CHANGE UP (ref 0–8)
GLUCOSE SERPL-MCNC: 136 MG/DL — HIGH (ref 70–99)
HCT VFR BLD CALC: 27.8 % — LOW (ref 37–47)
HGB BLD-MCNC: 9.1 G/DL — LOW (ref 12–16)
IMM GRANULOCYTES NFR BLD AUTO: 0.4 % — HIGH (ref 0.1–0.3)
LACTATE SERPL-SCNC: 1 MMOL/L — SIGNIFICANT CHANGE UP (ref 0.5–2.2)
LIDOCAIN IGE QN: 48 U/L — SIGNIFICANT CHANGE UP (ref 7–60)
LYMPHOCYTES # BLD AUTO: 1.05 K/UL — LOW (ref 1.2–3.4)
LYMPHOCYTES # BLD AUTO: 11.1 % — LOW (ref 20.5–51.1)
MCHC RBC-ENTMCNC: 30.2 PG — SIGNIFICANT CHANGE UP (ref 27–31)
MCHC RBC-ENTMCNC: 32.7 G/DL — SIGNIFICANT CHANGE UP (ref 32–37)
MCV RBC AUTO: 92.4 FL — SIGNIFICANT CHANGE UP (ref 81–99)
MONOCYTES # BLD AUTO: 1.04 K/UL — HIGH (ref 0.1–0.6)
MONOCYTES NFR BLD AUTO: 10.9 % — HIGH (ref 1.7–9.3)
NEUTROPHILS # BLD AUTO: 7.16 K/UL — HIGH (ref 1.4–6.5)
NEUTROPHILS NFR BLD AUTO: 75.4 % — HIGH (ref 42.2–75.2)
NRBC # BLD: 0 /100 WBCS — SIGNIFICANT CHANGE UP (ref 0–0)
PLATELET # BLD AUTO: 192 K/UL — SIGNIFICANT CHANGE UP (ref 130–400)
POTASSIUM SERPL-MCNC: 5.2 MMOL/L — HIGH (ref 3.5–5)
POTASSIUM SERPL-SCNC: 5.2 MMOL/L — HIGH (ref 3.5–5)
PROT SERPL-MCNC: 7.1 G/DL — SIGNIFICANT CHANGE UP (ref 6–8)
RBC # BLD: 3.01 M/UL — LOW (ref 4.2–5.4)
RBC # FLD: 17.1 % — HIGH (ref 11.5–14.5)
SODIUM SERPL-SCNC: 138 MMOL/L — SIGNIFICANT CHANGE UP (ref 135–146)
WBC # BLD: 9.5 K/UL — SIGNIFICANT CHANGE UP (ref 4.8–10.8)
WBC # FLD AUTO: 9.5 K/UL — SIGNIFICANT CHANGE UP (ref 4.8–10.8)

## 2019-11-19 PROCEDURE — 74176 CT ABD & PELVIS W/O CONTRAST: CPT | Mod: 26

## 2019-11-19 PROCEDURE — 99284 EMERGENCY DEPT VISIT MOD MDM: CPT | Mod: GC

## 2019-11-19 PROCEDURE — 71045 X-RAY EXAM CHEST 1 VIEW: CPT | Mod: 26

## 2019-11-19 RX ORDER — IOHEXOL 300 MG/ML
30 INJECTION, SOLUTION INTRAVENOUS ONCE
Refills: 0 | Status: COMPLETED | OUTPATIENT
Start: 2019-11-19 | End: 2019-11-19

## 2019-11-19 RX ORDER — SODIUM CHLORIDE 9 MG/ML
1900 INJECTION, SOLUTION INTRAVENOUS ONCE
Refills: 0 | Status: COMPLETED | OUTPATIENT
Start: 2019-11-19 | End: 2019-11-19

## 2019-11-19 RX ADMIN — IOHEXOL 30 MILLILITER(S): 300 INJECTION, SOLUTION INTRAVENOUS at 05:40

## 2019-11-19 RX ADMIN — SODIUM CHLORIDE 1900 MILLILITER(S): 9 INJECTION, SOLUTION INTRAVENOUS at 05:52

## 2019-11-19 NOTE — ED PROVIDER NOTE - PATIENT PORTAL LINK FT
You can access the FollowMyHealth Patient Portal offered by [facility] by registering at the following website: [email or web address]. By joining [employer]’s FollowMyHealth portal, you will also be able to view your health information using other applications (apps) compatible with our system.

## 2019-11-19 NOTE — ED ADULT NURSE NOTE - OBJECTIVE STATEMENT
BIBA c/o diarrhea since 2am. Hx C.diff. No obvious deformities or trauma noted. Alert and oriented x3.

## 2019-11-19 NOTE — ED PROVIDER NOTE - PROGRESS NOTE DETAILS
Pt s/o to Dr. Rea to follow up labs, imaging, reassess and dispo. CT study being done without IV contrast due to low pt GFR Authored by Dr. Rea: s/o to me dr olmedo -- pt with hx of nephrectomy sec to ca, copd, htn, cdiff in july 2019, now with diarrhea. abd cramps, no vomiting. on exam, stable, pallor, abd soft, + mild distension, no tenderness, no peritoneal signs. labs reviewed. renal failure stable. bun elev. pending ct.

## 2019-11-19 NOTE — ED PROVIDER NOTE - NSFOLLOWUPINSTRUCTIONS_ED_ALL_ED_FT
Diarrhea    Diarrhea is frequent loose or watery bowel movements that has many causes. Diarrhea can make you feel weak and cause you to become dehydrated. Diarrhea typically lasts 2–3 days, but can last longer if it is a sign of something more serious. Drink clear fluids to prevent dehydration. Eat bland, easy-to-digest foods as tolerated.     SEEK IMMEDIATE MEDICAL CARE IF YOU HAVE ANY OF THE FOLLOWING SYMPTOMS: high fevers, lightheadedness/dizziness, chest pain, black or bloody stools, shortness of breath, severe abdominal or back pain, or any signs of dehydration.    Please take probiotics every day to replenish the natural gut bacteria for 2 weeks

## 2019-11-19 NOTE — ED PROVIDER NOTE - ATTENDING CONTRIBUTION TO CARE
89 yo female with PMH UC, hx Cdiff (7/19), COPD, HTN presents c/o acute watery nonbloody diarrhea that started around 1 AM. + lower abdominal discomfort worse in LLQ. No N/V, fevers or chills. Denies any recent antibiotic use or change in diet. No CP. SOB, palpitations or urinary complaints.     VITAL SIGNS: noted  CONSTITUTIONAL: Well-developed; well-nourished; in no acute distress  HEAD: Normocephalic; atraumatic  EYES: PERRL, EOM intact; conjunctiva and sclera clear  ENT: No nasal discharge; airway clear. MMM  NECK: Supple; non tender. No anterior cervical lymphadenopathy noted  CARD: S1, S2 normal; no murmurs, gallops, or rubs. Regular rate and rhythm  RESP: CTAB/L, no wheezes, rales or rhonchi  ABD: Normal bowel sounds; soft; non-distended; + mild diffuse tenderness, no rebound or guarding; no hepatosplenomegaly.    EXT: Normal ROM. No calf tenderness or edema. Distal pulses intact  NEURO: Alert, oriented. Grossly unremarkable. No focal deficits  SKIN: Skin exam is warm and dry

## 2019-11-19 NOTE — ED ADULT NURSE NOTE - PMH
Cancer  Bladder 1981  Colitis  with diarrhea  COPD (chronic obstructive pulmonary disease)    COPD with emphysema    HTN (hypertension)    Other type of osteoarthritis

## 2019-11-19 NOTE — ED PROVIDER NOTE - NS ED ROS FT
Eyes:  No visual changes, eye pain or discharge.  ENMT:  No hearing changes, pain, discharge or infections. No neck pain or stiffness.  Cardiac:  No chest pain, SOB or edema. No chest pain with exertion.  Respiratory:  No cough or respiratory distress. No hemoptysis. No history of asthma or RAD.  GI:  Diarrhea, abd pain. No n/v, melena, hematochezia.   :  No dysuria, frequency or burning.  MS:  No myalgia, muscle weakness, joint pain or back pain.  Neuro:  No headache or weakness.  No LOC.  Skin:  No skin rash.   Endocrine: No history of thyroid disease or diabetes.

## 2019-11-19 NOTE — ED PROVIDER NOTE - CLINICAL SUMMARY MEDICAL DECISION MAKING FREE TEXT BOX
diarrhea, no evid of c.diff infection. labs reviewed. ct findings unchanged. pt has f/u with GI dr Kendall in 2 days.  daughter in law at bedside.  labs and imaging reviewed with pt and her daughter in law. given good instructions when to return to ED and importance of f/u. pt and d-in law verbalized understanding.

## 2019-11-19 NOTE — ED PROVIDER NOTE - OBJECTIVE STATEMENT
89 y f pmh cdiff in July, UC, copd not on home o2, htn pw diarrhea. Constant diarrhea since 1 am. Associated with diffuse abdominal pain. Worst over LLQ. Achy, cramping. No alleviating or exacerbating factors. Feels similar to the last time she had cdiff. Denies recent abx use. Denies fever, chills, n/v, back pain, cp, sob, melena, hematochezia.

## 2019-11-19 NOTE — ED PROVIDER NOTE - PHYSICAL EXAMINATION
CONSTITUTIONAL: Well-developed; well-nourished; in no acute distress.   SKIN: warm, dry  HEAD: Normocephalic; atraumatic.  EYES: PERRL, EOMI, normal sclera and conjunctiva   ENT: No nasal discharge; airway clear.  NECK: Supple; non tender.  CARD: S1, S2 normal; no murmurs, gallops, or rubs. Regular rate and rhythm.   RESP: No wheezes, rales or rhonchi.  ABD: soft, nondistended. TTP diffusely, worst over LLQ. No rebound/guarding.   EXT: Normal ROM.  No clubbing, cyanosis or edema.   LYMPH: No acute cervical adenopathy.  NEURO: Alert, oriented, grossly unremarkable  PSYCH: Cooperative, appropriate.

## 2020-09-28 ENCOUNTER — APPOINTMENT (OUTPATIENT)
Dept: UROLOGY | Facility: CLINIC | Age: 85
End: 2020-09-28

## 2020-10-05 ENCOUNTER — APPOINTMENT (OUTPATIENT)
Dept: UROLOGY | Facility: CLINIC | Age: 85
End: 2020-10-05
Payer: MEDICARE

## 2020-10-05 VITALS — HEIGHT: 64 IN | WEIGHT: 135 LBS | TEMPERATURE: 97.9 F | BODY MASS INDEX: 23.05 KG/M2

## 2020-10-05 PROCEDURE — 99213 OFFICE O/P EST LOW 20 MIN: CPT

## 2020-10-05 NOTE — ASSESSMENT
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

## 2020-10-05 NOTE — PHYSICAL EXAM
[General Appearance - Well Developed] : well developed [General Appearance - Well Nourished] : well nourished [Bowel Sounds] : normal bowel sounds [Skin Color & Pigmentation] : normal skin color and pigmentation [Oriented To Time, Place, And Person] : oriented to person, place, and time [Not Anxious] : not anxious [Normal Station and Gait] : the gait and station were normal for the patient's age [No Focal Deficits] : no focal deficits

## 2020-10-05 NOTE — HISTORY OF PRESENT ILLNESS
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

## 2020-10-19 ENCOUNTER — APPOINTMENT (OUTPATIENT)
Dept: UROLOGY | Facility: CLINIC | Age: 85
End: 2020-10-19
Payer: MEDICARE

## 2020-10-19 VITALS — HEIGHT: 64 IN

## 2020-10-19 PROCEDURE — 52000 CYSTOURETHROSCOPY: CPT

## 2020-10-19 PROCEDURE — 99072 ADDL SUPL MATRL&STAF TM PHE: CPT

## 2020-10-19 RX ORDER — CEFUROXIME AXETIL 250 MG/1
250 TABLET ORAL
Qty: 6 | Refills: 0 | Status: ACTIVE | COMMUNITY
Start: 2020-10-19 | End: 1900-01-01

## 2020-11-01 ENCOUNTER — LABORATORY RESULT (OUTPATIENT)
Age: 85
End: 2020-11-01

## 2020-11-02 ENCOUNTER — APPOINTMENT (OUTPATIENT)
Dept: UROLOGY | Facility: CLINIC | Age: 85
End: 2020-11-02
Payer: MEDICARE

## 2020-11-02 VITALS — BODY MASS INDEX: 23.05 KG/M2 | WEIGHT: 135 LBS | TEMPERATURE: 97 F | HEIGHT: 64 IN

## 2020-11-02 DIAGNOSIS — C67.9 MALIGNANT NEOPLASM OF BLADDER, UNSPECIFIED: ICD-10-CM

## 2020-11-02 DIAGNOSIS — C65.9 MALIGNANT NEOPLASM OF UNSPECIFIED RENAL PELVIS: ICD-10-CM

## 2020-11-02 PROCEDURE — 99072 ADDL SUPL MATRL&STAF TM PHE: CPT

## 2020-11-02 PROCEDURE — 99214 OFFICE O/P EST MOD 30 MIN: CPT

## 2020-11-04 ENCOUNTER — RESULT REVIEW (OUTPATIENT)
Age: 85
End: 2020-11-04

## 2020-11-04 ENCOUNTER — OUTPATIENT (OUTPATIENT)
Dept: OUTPATIENT SERVICES | Facility: HOSPITAL | Age: 85
LOS: 1 days | Discharge: HOME | End: 2020-11-04
Payer: MEDICARE

## 2020-11-04 VITALS
OXYGEN SATURATION: 97 % | HEIGHT: 63 IN | DIASTOLIC BLOOD PRESSURE: 77 MMHG | TEMPERATURE: 98 F | WEIGHT: 139.99 LBS | SYSTOLIC BLOOD PRESSURE: 171 MMHG | HEART RATE: 62 BPM | RESPIRATION RATE: 16 BRPM

## 2020-11-04 DIAGNOSIS — Z01.818 ENCOUNTER FOR OTHER PREPROCEDURAL EXAMINATION: ICD-10-CM

## 2020-11-04 DIAGNOSIS — Z98.890 OTHER SPECIFIED POSTPROCEDURAL STATES: Chronic | ICD-10-CM

## 2020-11-04 DIAGNOSIS — Z96.641 PRESENCE OF RIGHT ARTIFICIAL HIP JOINT: Chronic | ICD-10-CM

## 2020-11-04 DIAGNOSIS — Z96.652 PRESENCE OF LEFT ARTIFICIAL KNEE JOINT: Chronic | ICD-10-CM

## 2020-11-04 DIAGNOSIS — C67.9 MALIGNANT NEOPLASM OF BLADDER, UNSPECIFIED: ICD-10-CM

## 2020-11-04 DIAGNOSIS — Z90.5 ACQUIRED ABSENCE OF KIDNEY: Chronic | ICD-10-CM

## 2020-11-04 DIAGNOSIS — C67.9 MALIGNANT NEOPLASM OF BLADDER, UNSPECIFIED: Chronic | ICD-10-CM

## 2020-11-04 LAB
ALBUMIN SERPL ELPH-MCNC: 4.2 G/DL — SIGNIFICANT CHANGE UP (ref 3.5–5.2)
ALP SERPL-CCNC: 100 U/L — SIGNIFICANT CHANGE UP (ref 30–115)
ALT FLD-CCNC: 10 U/L — SIGNIFICANT CHANGE UP (ref 0–41)
ANION GAP SERPL CALC-SCNC: 12 MMOL/L — SIGNIFICANT CHANGE UP (ref 7–14)
APPEARANCE UR: ABNORMAL
APTT BLD: 32.7 SEC — SIGNIFICANT CHANGE UP (ref 27–39.2)
AST SERPL-CCNC: 14 U/L — SIGNIFICANT CHANGE UP (ref 0–41)
BACTERIA # UR AUTO: ABNORMAL
BASOPHILS # BLD AUTO: 0.05 K/UL — SIGNIFICANT CHANGE UP (ref 0–0.2)
BASOPHILS NFR BLD AUTO: 0.8 % — SIGNIFICANT CHANGE UP (ref 0–1)
BILIRUB SERPL-MCNC: 0.4 MG/DL — SIGNIFICANT CHANGE UP (ref 0.2–1.2)
BILIRUB UR-MCNC: NEGATIVE — SIGNIFICANT CHANGE UP
BUN SERPL-MCNC: 45 MG/DL — HIGH (ref 10–20)
CALCIUM SERPL-MCNC: 9.3 MG/DL — SIGNIFICANT CHANGE UP (ref 8.5–10.1)
CHLORIDE SERPL-SCNC: 105 MMOL/L — SIGNIFICANT CHANGE UP (ref 98–110)
CO2 SERPL-SCNC: 20 MMOL/L — SIGNIFICANT CHANGE UP (ref 17–32)
COLOR SPEC: YELLOW — SIGNIFICANT CHANGE UP
CREAT SERPL-MCNC: 2 MG/DL — HIGH (ref 0.7–1.5)
DIFF PNL FLD: ABNORMAL
EOSINOPHIL # BLD AUTO: 0.21 K/UL — SIGNIFICANT CHANGE UP (ref 0–0.7)
EOSINOPHIL NFR BLD AUTO: 3.2 % — SIGNIFICANT CHANGE UP (ref 0–8)
EPI CELLS # UR: 1 /HPF — SIGNIFICANT CHANGE UP (ref 0–5)
GLUCOSE SERPL-MCNC: 94 MG/DL — SIGNIFICANT CHANGE UP (ref 70–99)
GLUCOSE UR QL: NEGATIVE — SIGNIFICANT CHANGE UP
HCT VFR BLD CALC: 26.7 % — LOW (ref 37–47)
HGB BLD-MCNC: 8.2 G/DL — LOW (ref 12–16)
HYALINE CASTS # UR AUTO: 2 /LPF — SIGNIFICANT CHANGE UP (ref 0–7)
IMM GRANULOCYTES NFR BLD AUTO: 0.8 % — HIGH (ref 0.1–0.3)
INR BLD: 0.94 RATIO — SIGNIFICANT CHANGE UP (ref 0.65–1.3)
KETONES UR-MCNC: NEGATIVE — SIGNIFICANT CHANGE UP
LEUKOCYTE ESTERASE UR-ACNC: ABNORMAL
LYMPHOCYTES # BLD AUTO: 1.32 K/UL — SIGNIFICANT CHANGE UP (ref 1.2–3.4)
LYMPHOCYTES # BLD AUTO: 20.4 % — LOW (ref 20.5–51.1)
MCHC RBC-ENTMCNC: 29.5 PG — SIGNIFICANT CHANGE UP (ref 27–31)
MCHC RBC-ENTMCNC: 30.7 G/DL — LOW (ref 32–37)
MCV RBC AUTO: 96 FL — SIGNIFICANT CHANGE UP (ref 81–99)
MONOCYTES # BLD AUTO: 0.67 K/UL — HIGH (ref 0.1–0.6)
MONOCYTES NFR BLD AUTO: 10.4 % — HIGH (ref 1.7–9.3)
NEUTROPHILS # BLD AUTO: 4.17 K/UL — SIGNIFICANT CHANGE UP (ref 1.4–6.5)
NEUTROPHILS NFR BLD AUTO: 64.4 % — SIGNIFICANT CHANGE UP (ref 42.2–75.2)
NITRITE UR-MCNC: NEGATIVE — SIGNIFICANT CHANGE UP
NRBC # BLD: 0 /100 WBCS — SIGNIFICANT CHANGE UP (ref 0–0)
PH UR: 6 — SIGNIFICANT CHANGE UP (ref 5–8)
PLATELET # BLD AUTO: 208 K/UL — SIGNIFICANT CHANGE UP (ref 130–400)
POTASSIUM SERPL-MCNC: 5.9 MMOL/L — HIGH (ref 3.5–5)
POTASSIUM SERPL-SCNC: 5.9 MMOL/L — HIGH (ref 3.5–5)
PROT SERPL-MCNC: 6.5 G/DL — SIGNIFICANT CHANGE UP (ref 6–8)
PROT UR-MCNC: ABNORMAL
PROTHROM AB SERPL-ACNC: 10.8 SEC — SIGNIFICANT CHANGE UP (ref 9.95–12.87)
RBC # BLD: 2.78 M/UL — LOW (ref 4.2–5.4)
RBC # FLD: 17.7 % — HIGH (ref 11.5–14.5)
RBC CASTS # UR COMP ASSIST: 18 /HPF — HIGH (ref 0–4)
SODIUM SERPL-SCNC: 137 MMOL/L — SIGNIFICANT CHANGE UP (ref 135–146)
SP GR SPEC: 1.01 — SIGNIFICANT CHANGE UP (ref 1.01–1.03)
UROBILINOGEN FLD QL: SIGNIFICANT CHANGE UP
WBC # BLD: 6.47 K/UL — SIGNIFICANT CHANGE UP (ref 4.8–10.8)
WBC # FLD AUTO: 6.47 K/UL — SIGNIFICANT CHANGE UP (ref 4.8–10.8)
WBC UR QL: >720 /HPF — HIGH (ref 0–5)

## 2020-11-04 PROCEDURE — 71046 X-RAY EXAM CHEST 2 VIEWS: CPT | Mod: 26

## 2020-11-04 PROCEDURE — 93010 ELECTROCARDIOGRAM REPORT: CPT

## 2020-11-04 NOTE — H&P PST ADULT - REASON FOR ADMISSION
89 yo female presents for PAST in preparation for cystoscopy transurethral resection of bladder tumor on 11/18/2020 under general anesthesia by Dr. Russ

## 2020-11-04 NOTE — H&P PST ADULT - NSICDXPASTMEDICALHX_GEN_ALL_CORE_FT
PAST MEDICAL HISTORY:  Cancer Bladder 1981    Colitis with diarrhea    COPD with emphysema     HTN (hypertension)     Hypothyroidism     Other type of osteoarthritis

## 2020-11-04 NOTE — H&P PST ADULT - HISTORY OF PRESENT ILLNESS
Pt has had hx of bladder CA for many years. It was recently determined that she had multiple tumors in the bladder which needs to be removed now. Pt denies any urinary symptoms. Denies any chest pain, difficulty breathing, SOB, palpitations, dysuria, URI, or any other infections in the last 2 weeks. Denies any recent travel, contact, or exposure to any persons with known or suspected COVID-19. Pt also denies COVID testing within the last 2 weeks. Denies any suicidal or homicidal ideations. Pt advised to self quarantine until day of procedure. Exercise tolerance of 0.5-1 flight of stairs without dyspnea but limited d/t unstable gait. JEIMY reviewed with patient. Pt verbalized understanding of all pre-operative instructions.    Anesthesia Alert  NO--Difficult Airway  NO--History of neck surgery or radiation  NO--Limited ROM of neck  NO--History of Malignant hyperthermia  NO--No personal or family history of Pseudocholinesterase deficiency.  NO--Prior Anesthesia Complication  NO--Latex Allergy  NO--Loose teeth  NO--History of Rheumatoid Arthritis  NO--JEIMY  NO--Other_____

## 2020-11-04 NOTE — H&P PST ADULT - NSICDXPASTSURGICALHX_GEN_ALL_CORE_FT
PAST SURGICAL HISTORY:  H/O breast surgery 25% calcification removed    History of hip replacement, total, right     History of lung surgery     History of nephrectomy Left    History of total left knee replacement (TKR)     Malignant neoplasm of urinary bladder, unspecified site Removal x 3

## 2020-11-09 ENCOUNTER — OUTPATIENT (OUTPATIENT)
Dept: OUTPATIENT SERVICES | Facility: HOSPITAL | Age: 85
LOS: 1 days | Discharge: HOME | End: 2020-11-09

## 2020-11-09 DIAGNOSIS — Z02.9 ENCOUNTER FOR ADMINISTRATIVE EXAMINATIONS, UNSPECIFIED: ICD-10-CM

## 2020-11-09 DIAGNOSIS — Z90.5 ACQUIRED ABSENCE OF KIDNEY: Chronic | ICD-10-CM

## 2020-11-09 DIAGNOSIS — Z96.641 PRESENCE OF RIGHT ARTIFICIAL HIP JOINT: Chronic | ICD-10-CM

## 2020-11-09 DIAGNOSIS — Z98.890 OTHER SPECIFIED POSTPROCEDURAL STATES: Chronic | ICD-10-CM

## 2020-11-09 DIAGNOSIS — C67.9 MALIGNANT NEOPLASM OF BLADDER, UNSPECIFIED: Chronic | ICD-10-CM

## 2020-11-09 DIAGNOSIS — Z96.652 PRESENCE OF LEFT ARTIFICIAL KNEE JOINT: Chronic | ICD-10-CM

## 2020-11-09 PROBLEM — E03.9 HYPOTHYROIDISM, UNSPECIFIED: Chronic | Status: ACTIVE | Noted: 2020-11-04

## 2020-11-09 PROBLEM — C65.9 CARCINOMA OF RENAL PELVIS: Status: ACTIVE | Noted: 2018-04-26

## 2020-11-09 LAB
APPEARANCE: ABNORMAL
BACTERIA UR CULT: ABNORMAL
BILIRUBIN URINE: NEGATIVE
BLOOD URINE: ABNORMAL
COLOR: YELLOW
GLUCOSE QUALITATIVE U: NEGATIVE
KETONES URINE: NEGATIVE
LEUKOCYTE ESTERASE URINE: ABNORMAL
NITRITE URINE: NEGATIVE
PH URINE: 6
POTASSIUM SERPL-MCNC: 5 MMOL/L — SIGNIFICANT CHANGE UP (ref 3.5–5)
POTASSIUM SERPL-SCNC: 5 MMOL/L — SIGNIFICANT CHANGE UP (ref 3.5–5)
PROTEIN URINE: ABNORMAL
SPECIFIC GRAVITY URINE: 1.02
UROBILINOGEN URINE: NORMAL

## 2020-11-09 NOTE — ASSESSMENT
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

## 2020-11-09 NOTE — HISTORY OF PRESENT ILLNESS
[Urinary Urgency] : urinary urgency [Nocturia] : nocturia [FreeTextEntry1] : 89 yo with history of renal pelvic TCC - s/p left nephroureterectomy >10 ys ago\par \par recurrent bladder cancer\par \par s/p TURBT 10/2018\par path - high grade TCC (non invasive)\par \par she is here with persistent nocturia - no daytime complaints\par Myrbetriq ordered but not filled\par \par no treatment for her bladder cancer - patient failed to follow up \par

## 2020-11-12 ENCOUNTER — NON-APPOINTMENT (OUTPATIENT)
Age: 85
End: 2020-11-12

## 2020-11-18 ENCOUNTER — APPOINTMENT (OUTPATIENT)
Dept: UROLOGY | Facility: HOSPITAL | Age: 85
End: 2020-11-18

## 2021-07-15 NOTE — H&P PST ADULT - NS MD HP INPLANTS MED DEV
Pt would like to know if having an ultrasound done is still necessary. Pt states that after her change in medication she if feeling a lot better. Please advise.    Thanks!   None

## 2021-12-08 NOTE — ED ADULT NURSE NOTE - PRO INTERPRETER NEED 2
English

## 2021-12-22 ENCOUNTER — INPATIENT (INPATIENT)
Facility: HOSPITAL | Age: 86
LOS: 2 days | Discharge: HOME HEALTH | End: 2021-12-25
Attending: INTERNAL MEDICINE | Admitting: INTERNAL MEDICINE
Payer: MEDICARE

## 2021-12-22 VITALS
HEIGHT: 63 IN | RESPIRATION RATE: 18 BRPM | TEMPERATURE: 97 F | SYSTOLIC BLOOD PRESSURE: 102 MMHG | HEART RATE: 61 BPM | OXYGEN SATURATION: 100 % | WEIGHT: 138.01 LBS | DIASTOLIC BLOOD PRESSURE: 60 MMHG

## 2021-12-22 DIAGNOSIS — Z96.641 PRESENCE OF RIGHT ARTIFICIAL HIP JOINT: Chronic | ICD-10-CM

## 2021-12-22 DIAGNOSIS — Z96.652 PRESENCE OF LEFT ARTIFICIAL KNEE JOINT: Chronic | ICD-10-CM

## 2021-12-22 DIAGNOSIS — Z98.890 OTHER SPECIFIED POSTPROCEDURAL STATES: Chronic | ICD-10-CM

## 2021-12-22 DIAGNOSIS — C67.9 MALIGNANT NEOPLASM OF BLADDER, UNSPECIFIED: Chronic | ICD-10-CM

## 2021-12-22 DIAGNOSIS — Z90.5 ACQUIRED ABSENCE OF KIDNEY: Chronic | ICD-10-CM

## 2021-12-22 LAB
ALBUMIN SERPL ELPH-MCNC: 3.8 G/DL — SIGNIFICANT CHANGE UP (ref 3.5–5.2)
ALP SERPL-CCNC: 76 U/L — SIGNIFICANT CHANGE UP (ref 30–115)
ALT FLD-CCNC: 11 U/L — SIGNIFICANT CHANGE UP (ref 0–41)
ANION GAP SERPL CALC-SCNC: 10 MMOL/L — SIGNIFICANT CHANGE UP (ref 7–14)
APPEARANCE UR: ABNORMAL
APTT BLD: 32.5 SEC — SIGNIFICANT CHANGE UP (ref 27–39.2)
AST SERPL-CCNC: 16 U/L — SIGNIFICANT CHANGE UP (ref 0–41)
BACTERIA # UR AUTO: ABNORMAL /HPF
BASE EXCESS BLDV CALC-SCNC: -0.9 MMOL/L — SIGNIFICANT CHANGE UP (ref -2–3)
BASOPHILS # BLD AUTO: 0.02 K/UL — SIGNIFICANT CHANGE UP (ref 0–0.2)
BASOPHILS NFR BLD AUTO: 0.3 % — SIGNIFICANT CHANGE UP (ref 0–1)
BILIRUB SERPL-MCNC: 0.2 MG/DL — SIGNIFICANT CHANGE UP (ref 0.2–1.2)
BILIRUB UR-MCNC: NEGATIVE — SIGNIFICANT CHANGE UP
BUN SERPL-MCNC: 60 MG/DL — HIGH (ref 10–20)
CA-I SERPL-SCNC: 1.32 MMOL/L — SIGNIFICANT CHANGE UP (ref 1.15–1.33)
CALCIUM SERPL-MCNC: 9.3 MG/DL — SIGNIFICANT CHANGE UP (ref 8.5–10.1)
CHLORIDE SERPL-SCNC: 104 MMOL/L — SIGNIFICANT CHANGE UP (ref 98–110)
CK SERPL-CCNC: 70 U/L — SIGNIFICANT CHANGE UP (ref 0–225)
CO2 SERPL-SCNC: 20 MMOL/L — SIGNIFICANT CHANGE UP (ref 17–32)
COD CRY URNS QL: NEGATIVE — SIGNIFICANT CHANGE UP
COLOR SPEC: YELLOW — SIGNIFICANT CHANGE UP
CREAT SERPL-MCNC: 2.9 MG/DL — HIGH (ref 0.7–1.5)
DIFF PNL FLD: ABNORMAL
EOSINOPHIL # BLD AUTO: 0.12 K/UL — SIGNIFICANT CHANGE UP (ref 0–0.7)
EOSINOPHIL NFR BLD AUTO: 2 % — SIGNIFICANT CHANGE UP (ref 0–8)
EPI CELLS # UR: ABNORMAL /HPF
GAS PNL BLDV: 134 MMOL/L — LOW (ref 136–145)
GAS PNL BLDV: SIGNIFICANT CHANGE UP
GLUCOSE SERPL-MCNC: 137 MG/DL — HIGH (ref 70–99)
GLUCOSE UR QL: NEGATIVE MG/DL — SIGNIFICANT CHANGE UP
GRAN CASTS # UR COMP ASSIST: NEGATIVE — SIGNIFICANT CHANGE UP
HCO3 BLDV-SCNC: 26 MMOL/L — SIGNIFICANT CHANGE UP (ref 22–29)
HCT VFR BLD CALC: 22.9 % — LOW (ref 37–47)
HCT VFR BLDA CALC: 30 % — LOW (ref 34.5–46.5)
HGB BLD CALC-MCNC: 9.9 G/DL — LOW (ref 11.7–16.1)
HGB BLD-MCNC: 7.4 G/DL — LOW (ref 12–16)
HYALINE CASTS # UR AUTO: NEGATIVE — SIGNIFICANT CHANGE UP
IMM GRANULOCYTES NFR BLD AUTO: 0.3 % — SIGNIFICANT CHANGE UP (ref 0.1–0.3)
INR BLD: 1.03 RATIO — SIGNIFICANT CHANGE UP (ref 0.65–1.3)
KETONES UR-MCNC: NEGATIVE — SIGNIFICANT CHANGE UP
LACTATE BLDV-MCNC: 1.2 MMOL/L — SIGNIFICANT CHANGE UP (ref 0.5–2)
LEUKOCYTE ESTERASE UR-ACNC: ABNORMAL
LYMPHOCYTES # BLD AUTO: 1.11 K/UL — LOW (ref 1.2–3.4)
LYMPHOCYTES # BLD AUTO: 18.9 % — LOW (ref 20.5–51.1)
MCHC RBC-ENTMCNC: 30.2 PG — SIGNIFICANT CHANGE UP (ref 27–31)
MCHC RBC-ENTMCNC: 32.3 G/DL — SIGNIFICANT CHANGE UP (ref 32–37)
MCV RBC AUTO: 93.5 FL — SIGNIFICANT CHANGE UP (ref 81–99)
MONOCYTES # BLD AUTO: 0.83 K/UL — HIGH (ref 0.1–0.6)
MONOCYTES NFR BLD AUTO: 14.1 % — HIGH (ref 1.7–9.3)
NEUTROPHILS # BLD AUTO: 3.77 K/UL — SIGNIFICANT CHANGE UP (ref 1.4–6.5)
NEUTROPHILS NFR BLD AUTO: 64.4 % — SIGNIFICANT CHANGE UP (ref 42.2–75.2)
NITRITE UR-MCNC: NEGATIVE — SIGNIFICANT CHANGE UP
NRBC # BLD: 0 /100 WBCS — SIGNIFICANT CHANGE UP (ref 0–0)
PCO2 BLDV: 51 MMHG — HIGH (ref 39–42)
PH BLDV: 7.31 — LOW (ref 7.32–7.43)
PH UR: 5.5 — SIGNIFICANT CHANGE UP (ref 5–8)
PLATELET # BLD AUTO: 185 K/UL — SIGNIFICANT CHANGE UP (ref 130–400)
PO2 BLDV: 18 MMHG — SIGNIFICANT CHANGE UP
POTASSIUM BLDV-SCNC: 4.4 MMOL/L — SIGNIFICANT CHANGE UP (ref 3.5–5.1)
POTASSIUM SERPL-MCNC: 4.3 MMOL/L — SIGNIFICANT CHANGE UP (ref 3.5–5)
POTASSIUM SERPL-SCNC: 4.3 MMOL/L — SIGNIFICANT CHANGE UP (ref 3.5–5)
PROT SERPL-MCNC: 6.4 G/DL — SIGNIFICANT CHANGE UP (ref 6–8)
PROT UR-MCNC: >=300 MG/DL
PROTHROM AB SERPL-ACNC: 11.8 SEC — SIGNIFICANT CHANGE UP (ref 9.95–12.87)
RBC # BLD: 2.45 M/UL — LOW (ref 4.2–5.4)
RBC # FLD: 16.9 % — HIGH (ref 11.5–14.5)
RBC CASTS # UR COMP ASSIST: ABNORMAL /HPF
SAO2 % BLDV: 21.7 % — SIGNIFICANT CHANGE UP
SARS-COV-2 RNA SPEC QL NAA+PROBE: DETECTED
SODIUM SERPL-SCNC: 134 MMOL/L — LOW (ref 135–146)
SP GR SPEC: >=1.03 (ref 1.01–1.03)
TRI-PHOS CRY UR QL COMP ASSIST: NEGATIVE — SIGNIFICANT CHANGE UP
TROPONIN T SERPL-MCNC: 0.01 NG/ML — SIGNIFICANT CHANGE UP
URATE CRY FLD QL MICRO: NEGATIVE — SIGNIFICANT CHANGE UP
UROBILINOGEN FLD QL: 0.2 MG/DL — SIGNIFICANT CHANGE UP
WBC # BLD: 5.87 K/UL — SIGNIFICANT CHANGE UP (ref 4.8–10.8)
WBC # FLD AUTO: 5.87 K/UL — SIGNIFICANT CHANGE UP (ref 4.8–10.8)
WBC UR QL: ABNORMAL /HPF

## 2021-12-22 PROCEDURE — 93010 ELECTROCARDIOGRAM REPORT: CPT

## 2021-12-22 PROCEDURE — 70450 CT HEAD/BRAIN W/O DYE: CPT | Mod: 26,MA

## 2021-12-22 PROCEDURE — 72170 X-RAY EXAM OF PELVIS: CPT | Mod: 26

## 2021-12-22 PROCEDURE — 99223 1ST HOSP IP/OBS HIGH 75: CPT | Mod: AI

## 2021-12-22 PROCEDURE — 99285 EMERGENCY DEPT VISIT HI MDM: CPT | Mod: CS

## 2021-12-22 PROCEDURE — 72125 CT NECK SPINE W/O DYE: CPT | Mod: 26,MA

## 2021-12-22 PROCEDURE — 71045 X-RAY EXAM CHEST 1 VIEW: CPT | Mod: 26

## 2021-12-22 RX ORDER — HEPARIN SODIUM 5000 [USP'U]/ML
5000 INJECTION INTRAVENOUS; SUBCUTANEOUS EVERY 8 HOURS
Refills: 0 | Status: DISCONTINUED | OUTPATIENT
Start: 2021-12-22 | End: 2021-12-25

## 2021-12-22 RX ORDER — SODIUM CHLORIDE 9 MG/ML
1000 INJECTION INTRAMUSCULAR; INTRAVENOUS; SUBCUTANEOUS
Refills: 0 | Status: DISCONTINUED | OUTPATIENT
Start: 2021-12-22 | End: 2021-12-23

## 2021-12-22 RX ORDER — TIOTROPIUM BROMIDE 18 UG/1
1 CAPSULE ORAL; RESPIRATORY (INHALATION) DAILY
Refills: 0 | Status: DISCONTINUED | OUTPATIENT
Start: 2021-12-22 | End: 2021-12-25

## 2021-12-22 RX ORDER — SODIUM CHLORIDE 9 MG/ML
1000 INJECTION, SOLUTION INTRAVENOUS ONCE
Refills: 0 | Status: COMPLETED | OUTPATIENT
Start: 2021-12-22 | End: 2021-12-22

## 2021-12-22 RX ORDER — SIMVASTATIN 20 MG/1
20 TABLET, FILM COATED ORAL AT BEDTIME
Refills: 0 | Status: DISCONTINUED | OUTPATIENT
Start: 2021-12-22 | End: 2021-12-25

## 2021-12-22 RX ORDER — DULOXETINE HYDROCHLORIDE 30 MG/1
60 CAPSULE, DELAYED RELEASE ORAL DAILY
Refills: 0 | Status: DISCONTINUED | OUTPATIENT
Start: 2021-12-22 | End: 2021-12-25

## 2021-12-22 RX ORDER — CEFTRIAXONE 500 MG/1
1000 INJECTION, POWDER, FOR SOLUTION INTRAMUSCULAR; INTRAVENOUS EVERY 24 HOURS
Refills: 0 | Status: DISCONTINUED | OUTPATIENT
Start: 2021-12-23 | End: 2021-12-25

## 2021-12-22 RX ORDER — LEVOTHYROXINE SODIUM 125 MCG
75 TABLET ORAL DAILY
Refills: 0 | Status: DISCONTINUED | OUTPATIENT
Start: 2021-12-22 | End: 2021-12-25

## 2021-12-22 RX ORDER — METOPROLOL TARTRATE 50 MG
25 TABLET ORAL DAILY
Refills: 0 | Status: DISCONTINUED | OUTPATIENT
Start: 2021-12-22 | End: 2021-12-25

## 2021-12-22 RX ORDER — AMLODIPINE BESYLATE 2.5 MG/1
5 TABLET ORAL
Refills: 0 | Status: DISCONTINUED | OUTPATIENT
Start: 2021-12-22 | End: 2021-12-25

## 2021-12-22 RX ORDER — METOPROLOL TARTRATE 50 MG
50 TABLET ORAL AT BEDTIME
Refills: 0 | Status: DISCONTINUED | OUTPATIENT
Start: 2021-12-22 | End: 2021-12-25

## 2021-12-22 RX ORDER — CEFTRIAXONE 500 MG/1
1000 INJECTION, POWDER, FOR SOLUTION INTRAMUSCULAR; INTRAVENOUS ONCE
Refills: 0 | Status: COMPLETED | OUTPATIENT
Start: 2021-12-22 | End: 2021-12-22

## 2021-12-22 RX ADMIN — CEFTRIAXONE 100 MILLIGRAM(S): 500 INJECTION, POWDER, FOR SOLUTION INTRAMUSCULAR; INTRAVENOUS at 17:00

## 2021-12-22 RX ADMIN — SODIUM CHLORIDE 80 MILLILITER(S): 9 INJECTION INTRAMUSCULAR; INTRAVENOUS; SUBCUTANEOUS at 23:17

## 2021-12-22 RX ADMIN — HEPARIN SODIUM 5000 UNIT(S): 5000 INJECTION INTRAVENOUS; SUBCUTANEOUS at 21:31

## 2021-12-22 RX ADMIN — Medication 50 MILLIGRAM(S): at 21:31

## 2021-12-22 RX ADMIN — SODIUM CHLORIDE 666.67 MILLILITER(S): 9 INJECTION, SOLUTION INTRAVENOUS at 16:20

## 2021-12-22 RX ADMIN — AMLODIPINE BESYLATE 5 MILLIGRAM(S): 2.5 TABLET ORAL at 21:32

## 2021-12-22 RX ADMIN — SIMVASTATIN 20 MILLIGRAM(S): 20 TABLET, FILM COATED ORAL at 21:31

## 2021-12-22 NOTE — PATIENT PROFILE ADULT - FALL HARM RISK - HARM RISK INTERVENTIONS

## 2021-12-22 NOTE — ED PROVIDER NOTE - NS ED ROS FT
Review of Systems  Constitutional:  No fever, chills, malaise, generalized weakness.  Eyes:  No visual changes, eye pain, or discharge.  ENMT:   No nasal congestion, discharge, or bleeding. No throat pain, swelling, or difficulty swallowing.  Cardiac:  No chest pain, palpitations, syncope, or edema.  Respiratory:  No dyspnea, orthopnea, stridor, wheezing, + cough. No hemoptysis.  GI:  No nausea, vomiting, diarrhea, or abdominal pain. No melena or hematochezia.  :  No dysuria, hematuria, frequency, or burning. Normal urine output.  MS:  No myalgia, muscle weakness, gait abnormality, joint swelling, + joint pain,  + back pain.  Skin:  No skin rash  Neuro:  No headache, dizziness,

## 2021-12-22 NOTE — ED PROVIDER NOTE - PHYSICAL EXAMINATION
VITAL SIGNS: I have reviewed nursing notes and confirm.  CONSTITUTIONAL: Well-developed; well-nourished; in no acute distress. pt has wet cough   SKIN: Skin exam is warm and dry, no acute rash or bruising  HEAD: Normocephalic; atraumatic.  EYES: PERRL, EOM intact; conjunctiva and sclera clear.  ENT: No nasal discharge; airway clear.  NECK: Supple; non tender.  CARD: S1, S2 normal; no murmurs, gallops, or rubs. Regular rate and rhythm.  RESP: No wheezes, + rales in the right upper and lower lobes, -  rhonchi. Speaking in full sentences.   ABD: Normal bowel sounds; soft; non-distended; non-tender; No rebound or guarding. No CVA tenderness.  EXT: Normal ROM. No clubbing, cyanosis or edema.  NEURO: Alert, oriented. Grossly unremarkable. No focal deficits.   PSYCH: Cooperative, appropriate.

## 2021-12-22 NOTE — ED PROVIDER NOTE - ATTENDING CONTRIBUTION TO CARE
89F with pmh HTN, HLD, COPD not on O2, hx lung cancer, RCC s/p L nephrectomy who presents with fall. 2 night ago she was walking without her cane to the bathroom at 1am and slipped on wet towel, falling onto her bottom. Denies head trauma or loc. She felt too weak to get up, and was on the floor until her son checked on her at 10am. She was ambulatory afterwards with assistance but reports diffuse weakness. She has also had a productive cough x2d. Denies fevers, chills, abd pain, n/v/d.     vitals noted on arrival.     Gen - NAD, Head - NCAT, Pharynx - clear, MMM, Heart - RRR, no m/g/r, Lungs - CTAB, no w/c/r, Abdomen - soft, NT, ND, Skin - No rash, Extremities - FROM, no edema, erythema, ecchymosis, brisk cap refill, Neuro - A&O x3, equal strength and sensation, non-focal exam    a/p:  mechanical fall  HCT, CT C-spine, CXR, XR pelvis  labs, ivf, reassess 89F with pmh HTN, HLD, COPD not on O2, hx lung cancer, RCC s/p L nephrectomy who presents with fall. 2 nights ago she was walking without her cane to the bathroom at 1am and slipped on wet towel, falling onto her bottom. Denies head trauma or loc. She felt too weak to get up, and was on the floor until her son checked on her at 10am. She was ambulatory afterwards with assistance but reports diffuse weakness. She has also had a productive cough x2d. Denies fevers, chills, abd pain, n/v/d.     vitals noted on arrival.     Gen - NAD, Head - NCAT, Pharynx - clear, MMM, Heart - RRR, no m/g/r, Lungs - CTAB, no w/c/r, Abdomen - soft, NT, ND, Skin - No rash, Extremities - FROM, no edema, erythema, ecchymosis, brisk cap refill, Neuro - A&O x3, equal strength and sensation, non-focal exam    a/p:  mechanical fall  HCT, CT C-spine, CXR, XR pelvis  labs, ivf, reassess

## 2021-12-22 NOTE — ED PROVIDER NOTE - INTERPRETATION
sinus vane/normal sinus rhythm, Normal axis, Normal DE interval and QRS complex. There are no acute ischemic ST or T-wave changes.

## 2021-12-22 NOTE — H&P ADULT - ASSESSMENT
89F with pmh HTN, HLD, COPD not on O2, hx lung cancer, RCC s/p L nephrectomy who presents with fall and weakness. Found to be COVID +.    #Mechanical Fall  Trauma workup negative in ED  - f/u PT/OT  - Pain control PRN    #COVID+  CXR showing possible faint opacities on right lung field 89F with pmh HTN, HLD, COPD not on O2, hx lung cancer, RCC s/p L nephrectomy, hypothyroidism, anxiety who presents with fall and weakness. Found to be COVID +.    #Mechanical Fall  #Debility  Trauma workup negative in ED  - f/u PT/OT  - Pain control PRN    #COVID+  #Productive cough  Saturating 100% on RA  CXR showing possible faint opacities on right lung field  - Cont ceftriaxone 1g q24h  - f/u procalcitonin, DC antibiotics if negative  - No need for decadron at this time    #HTN/HLD  - Cont amlodipine 5mg BID  - Cont toprolol 25mg qAM, 50mg qPM  - Cont synthroid 75mg qD  - Cont simvastatin 20mg qHs    #COPD  - Cont spiriva (takes incruse at home)  - Duonebs PRN    #MANA on CKD Stage 4  Hx of RCC s/p nephrectomy  Increase creatinine likely pre-renal. UA with high specific gravity  s/p 1L IVF bolus in ED  - f/u repeat BMP in AM, start IVF     #Anxiety  - Cont cymbalta 60mg qD    #Hypothyroidism  - Cont synthroid 75mcg qAM    DVT PPX, Heparin    #Progress Note Handoff  Pending (specify): PT eval, monitoring O2 sat and fevers  Family discussion: d/w pt and family by bedside. If pt able to ambulate well, and vital signs are stable may be able to go home tomorrow  Disposition: Home 89F with pmh HTN, HLD, COPD not on O2, hx lung cancer, RCC s/p L nephrectomy, hypothyroidism, anxiety who presents with fall and weakness. Found to be COVID +.    #Mechanical Fall  #Debility  Trauma workup negative in ED  - f/u PT/OT  - Pain control PRN    #COVID+  #Productive cough  Saturating 100% on RA  CXR showing possible faint opacities on right lung field  - Cont ceftriaxone 1g q24h  - f/u procalcitonin, DC antibiotics if negative  - No need for decadron at this time    #HTN/HLD  - Cont amlodipine 5mg BID  - Cont toprolol 25mg qAM, 50mg qPM  - Cont synthroid 75mg qD  - Cont simvastatin 20mg qHs    #COPD  - Cont spiriva (takes incruse at home)  - Duonebs PRN    #MANA on CKD Stage 4  Hx of RCC s/p nephrectomy  Increase creatinine likely pre-renal. UA with high specific gravity  s/p 1L IVF bolus in ED  - f/u repeat BMP in AM,   - IVF     #Anxiety  - Cont cymbalta 60mg qD    #Hypothyroidism  - Cont synthroid 75mcg qAM    DVT PPX, Heparin    #Progress Note Handoff  Pending (specify): PT eval, monitoring O2 sat and fevers  Family discussion: d/w pt and family by bedside. If pt able to ambulate well, and vital signs are stable may be able to go home tomorrow  Disposition: Home

## 2021-12-22 NOTE — ED PROVIDER NOTE - OBJECTIVE STATEMENT
89 pmhx bladder CA, COPD, RA,  of presents to the ED for weakness, pt and family state pt fell in bathroom 2 days ago and was too weak to get up. pt denies any head trauma, LOC, or blood thinners. pt was not able to get up off the floor and had lives alone so she stayed on the floor for 10 hrs till her son came in to the house.. Son helped pt pff the floor and took her to see primary who instructed them to come here. pt states that she has some back pain that hurts all over and moves around.

## 2021-12-22 NOTE — ED PROVIDER NOTE - CARE PLAN
1 Principal Discharge DX:	2019 novel coronavirus disease (COVID-19)  Secondary Diagnosis:	Acute UTI

## 2021-12-22 NOTE — ED PROVIDER NOTE - CLINICAL SUMMARY MEDICAL DECISION MAKING FREE TEXT BOX
89F with pmh HTN, HLD, COPD not on O2, hx lung cancer, RCC s/p L nephrectomy who presents with mechanical fall, also with cough x2d. Labs and imaging reviewed. Patient covid + and has UTI. Pt given Rocephin, and transferred to [facility] for further treatment. Patient is vaccinated and received booster, not requiring oxygen.

## 2021-12-22 NOTE — H&P ADULT - HISTORY OF PRESENT ILLNESS
89F with pmh HTN, HLD, COPD not on O2, hx lung cancer, RCC s/p L nephrectomy who presents with fall. 2 night ago she was walking without her cane to the bathroom at 1am and slipped on wet towel, falling onto her bottom. Denies head trauma or loc. She felt too weak to get up, and was on the floor until her son checked on her at 10am. She was ambulatory afterwards with assistance but reports diffuse weakness. She has also had a productive cough x2d. Denies fevers, chills, abd pain, n/v/d.

## 2021-12-22 NOTE — H&P ADULT - NSHPPHYSICALEXAM_GEN_ALL_CORE
PHYSICAL EXAM:  GENERAL: NAD, well-developed  HEAD:  Atraumatic, Normocephalic  EYES: EOMI, PERRLA, conjunctiva and sclera clear  NECK: Supple, No JVD  CHEST/LUNG: Clear to auscultation b/l  HEART: Regular rate and rhythm; No murmurs, rubs, or gallops  ABDOMEN: Soft, Nontender, Nondistended; Bowel sounds present  EXTREMITIES:  2+ Peripheral Pulses, No clubbing, cyanosis, or edema  PSYCH: AAOx3  SKIN: No rashes or lesions

## 2021-12-23 LAB
ALBUMIN SERPL ELPH-MCNC: 3.7 G/DL — SIGNIFICANT CHANGE UP (ref 3.5–5.2)
ALP SERPL-CCNC: 79 U/L — SIGNIFICANT CHANGE UP (ref 30–115)
ALT FLD-CCNC: 11 U/L — SIGNIFICANT CHANGE UP (ref 0–41)
ANION GAP SERPL CALC-SCNC: 15 MMOL/L — HIGH (ref 7–14)
AST SERPL-CCNC: 15 U/L — SIGNIFICANT CHANGE UP (ref 0–41)
BILIRUB SERPL-MCNC: <0.2 MG/DL — SIGNIFICANT CHANGE UP (ref 0.2–1.2)
BLD GP AB SCN SERPL QL: SIGNIFICANT CHANGE UP
BUN SERPL-MCNC: 45 MG/DL — HIGH (ref 10–20)
CALCIUM SERPL-MCNC: 8.9 MG/DL — SIGNIFICANT CHANGE UP (ref 8.5–10.1)
CHLORIDE SERPL-SCNC: 111 MMOL/L — HIGH (ref 98–110)
CO2 SERPL-SCNC: 16 MMOL/L — LOW (ref 17–32)
CREAT SERPL-MCNC: 1.8 MG/DL — HIGH (ref 0.7–1.5)
GLUCOSE SERPL-MCNC: 162 MG/DL — HIGH (ref 70–99)
HCT VFR BLD CALC: 23.6 % — LOW (ref 37–47)
HGB BLD-MCNC: 7.8 G/DL — LOW (ref 12–16)
IRON SATN MFR SERPL: 21 % — SIGNIFICANT CHANGE UP (ref 15–50)
IRON SATN MFR SERPL: 52 UG/DL — SIGNIFICANT CHANGE UP (ref 35–150)
MCHC RBC-ENTMCNC: 31.2 PG — HIGH (ref 27–31)
MCHC RBC-ENTMCNC: 33.1 G/DL — SIGNIFICANT CHANGE UP (ref 32–37)
MCV RBC AUTO: 94.4 FL — SIGNIFICANT CHANGE UP (ref 81–99)
NRBC # BLD: 0 /100 WBCS — SIGNIFICANT CHANGE UP (ref 0–0)
PLATELET # BLD AUTO: 182 K/UL — SIGNIFICANT CHANGE UP (ref 130–400)
POTASSIUM SERPL-MCNC: 4.3 MMOL/L — SIGNIFICANT CHANGE UP (ref 3.5–5)
POTASSIUM SERPL-SCNC: 4.3 MMOL/L — SIGNIFICANT CHANGE UP (ref 3.5–5)
PROCALCITONIN SERPL-MCNC: 0.19 NG/ML — HIGH (ref 0.02–0.1)
PROT SERPL-MCNC: 6.2 G/DL — SIGNIFICANT CHANGE UP (ref 6–8)
RBC # BLD: 2.5 M/UL — LOW (ref 4.2–5.4)
RBC # FLD: 16.6 % — HIGH (ref 11.5–14.5)
SODIUM SERPL-SCNC: 142 MMOL/L — SIGNIFICANT CHANGE UP (ref 135–146)
TIBC SERPL-MCNC: 246 UG/DL — SIGNIFICANT CHANGE UP (ref 220–430)
TRANSFERRIN SERPL-MCNC: 200 MG/DL — SIGNIFICANT CHANGE UP (ref 200–360)
UIBC SERPL-MCNC: 194 UG/DL — SIGNIFICANT CHANGE UP (ref 110–370)
WBC # BLD: 5.61 K/UL — SIGNIFICANT CHANGE UP (ref 4.8–10.8)
WBC # FLD AUTO: 5.61 K/UL — SIGNIFICANT CHANGE UP (ref 4.8–10.8)

## 2021-12-23 PROCEDURE — 71250 CT THORAX DX C-: CPT | Mod: 26

## 2021-12-23 RX ORDER — GUAIFENESIN/DEXTROMETHORPHAN 600MG-30MG
10 TABLET, EXTENDED RELEASE 12 HR ORAL EVERY 8 HOURS
Refills: 0 | Status: DISCONTINUED | OUTPATIENT
Start: 2021-12-23 | End: 2021-12-25

## 2021-12-23 RX ADMIN — AMLODIPINE BESYLATE 5 MILLIGRAM(S): 2.5 TABLET ORAL at 05:29

## 2021-12-23 RX ADMIN — AMLODIPINE BESYLATE 5 MILLIGRAM(S): 2.5 TABLET ORAL at 17:00

## 2021-12-23 RX ADMIN — CEFTRIAXONE 100 MILLIGRAM(S): 500 INJECTION, POWDER, FOR SOLUTION INTRAMUSCULAR; INTRAVENOUS at 06:06

## 2021-12-23 RX ADMIN — HEPARIN SODIUM 5000 UNIT(S): 5000 INJECTION INTRAVENOUS; SUBCUTANEOUS at 05:29

## 2021-12-23 RX ADMIN — SIMVASTATIN 20 MILLIGRAM(S): 20 TABLET, FILM COATED ORAL at 21:27

## 2021-12-23 RX ADMIN — HEPARIN SODIUM 5000 UNIT(S): 5000 INJECTION INTRAVENOUS; SUBCUTANEOUS at 21:26

## 2021-12-23 RX ADMIN — Medication 10 MILLILITER(S): at 11:55

## 2021-12-23 RX ADMIN — Medication 50 MILLIGRAM(S): at 21:27

## 2021-12-23 RX ADMIN — DULOXETINE HYDROCHLORIDE 60 MILLIGRAM(S): 30 CAPSULE, DELAYED RELEASE ORAL at 11:52

## 2021-12-23 RX ADMIN — Medication 75 MICROGRAM(S): at 05:29

## 2021-12-23 RX ADMIN — HEPARIN SODIUM 5000 UNIT(S): 5000 INJECTION INTRAVENOUS; SUBCUTANEOUS at 13:33

## 2021-12-23 NOTE — PHYSICAL THERAPY INITIAL EVALUATION ADULT - PERSONAL SAFETY AND JUDGMENT, REHAB EVAL
intact Area M Indication Text: Tumors in this location are included in Area M (cheek, forehead, scalp, neck, jawline and pretibial skin).  Mohs surgery is indicated for tumors in these anatomic locations.

## 2021-12-23 NOTE — OCCUPATIONAL THERAPY INITIAL EVALUATION ADULT - ADDITIONAL COMMENTS
Pt resides in ground floor apartment with family upstairs. Pt reports never being alone when outside.

## 2021-12-23 NOTE — PROGRESS NOTE ADULT - ATTENDING COMMENTS
89F with pmh HTN, HLD, COPD not on O2, ?hx bladder cancer, pt denies Hx of lung cancer, RCC s/p L nephrectomy, hypothyroidism, anxiety who presents with weakness, feeling sick, +dysuria for 2 days. Pt found to be COVID + in ED, pt has been coughing since admission.     Physical exam:  NAD, not toxic looking  RESP: CTA b/l, no crackles, rhonchi  CVS: S1S2, RRR  GI: abdomen soft NT, ND  Extremities: LE no edema   NEURO: AOx3, no gross focal deficit    labs and images reviewed by me     # Weakness, dysuria, suspected UTI vs COVID. no sepsis. no respiratory failure.  - cont Rocephin for now   - f/u Ucx  - f/u inflammatory markers   - pt is saturating fine on room air, no need for Dexamethasone at the moment  - no Regeneron available in the hospital     # Mass-like opacity on CXR - pt states she had bladder cancer, not lung cancer  - will do CT chest     # MANA most likely on CKD4, vs progression of CKD  - cr in 2020 2.0  - cont gentle hydration  - repeat BMP tomorrow     # Remote Hx of RCC, s/p nephrectomy    DVT ppx     Anticipated dc in 24-48 hrs if remains stable, pending UCx, CT chest

## 2021-12-23 NOTE — OCCUPATIONAL THERAPY INITIAL EVALUATION ADULT - GENERAL OBSERVATIONS, REHAB EVAL
OT eval: 9:20-9:45 Pt received semi osborn in bed in NAD +IV fluids, +prima fit. Pt pleasant and agreeable to OT eval.

## 2021-12-23 NOTE — PHYSICAL THERAPY INITIAL EVALUATION ADULT - REHAB POTENTIAL, PT EVAL
Pt. is functioning independently at her OF, no AD required. Pt. is not a candidate for acute PT services, pt. encouraged to ambulate in her room ad ebonie./none

## 2021-12-23 NOTE — PHYSICAL THERAPY INITIAL EVALUATION ADULT - PERTINENT HX OF CURRENT PROBLEM, REHAB EVAL
89F with pmh HTN, HLD, COPD not on O2, hx lung cancer, RCC s/p L nephrectomy who presents with fall. 2 night ago she was walking without her cane to the bathroom at 1am and slipped on wet towel, falling onto her bottom. Denies head trauma or loc. She felt too weak to get up, and was on the floor until her son checked on her at 10am

## 2021-12-23 NOTE — OCCUPATIONAL THERAPY INITIAL EVALUATION ADULT - PERTINENT HX OF CURRENT PROBLEM, REHAB EVAL
89F with pmh HTN, HLD, COPD not on O2, hx lung cancer, RCC s/p L nephrectomy who presents with fall. 2 night ago she was walking without her cane to the bathroom at 1am and slipped on wet towel, falling onto her bottom. Denies head trauma or loc. She felt too weak to get up, and was on the floor until her son checked on her at 10am. She was ambulatory afterwards with assistance but reports diffuse weakness. She has also had a productive cough x2d.

## 2021-12-23 NOTE — OCCUPATIONAL THERAPY INITIAL EVALUATION ADULT - TRANSFER TRAINING, PT EVAL
Pt will perform bed<>chair and transfer to toilet with modified indep discharge to work towards returning to PLOF

## 2021-12-23 NOTE — PHYSICAL THERAPY INITIAL EVALUATION ADULT - ADDITIONAL COMMENTS
Pt. reports she is fully independent and uses a SC very infrequently. Pt. reports she lives on the ground level and her son lives upstairs, who helps her with whatever she needs.

## 2021-12-23 NOTE — PROGRESS NOTE ADULT - ASSESSMENT
89F with pmh HTN, HLD, COPD not on O2, hx lung cancer, RCC s/p L nephrectomy, hypothyroidism, anxiety who presents with fall and weakness. Found to be COVID +.    #Mechanical Fall  #Debility  - Trauma workup negative in ED  - f/u PT/OT  - Pain control PRN    #COVID+  #Productive cough  Saturating 100% on RA  CXR showing possible faint opacities on right lung field  - Cont ceftriaxone 1g q24h  - f/u procalcitonin  - No need for decadron at this time    #possible UTI  - UA positive (LLE, nitrite negative, 10-25 WBC)  - f/u U cx  - on ceftriaxone    #MANA on CKD Stage 4  Hx of RCC s/p nephrectomy  Increase creatinine likely pre-renal. UA with high specific gravity  s/p 1L IVF bolus in ED  - f/u repeat BMP in AM,   - IVF     #anemia  - Hb 7.2 MCV 93  - baseline 8.2  - anemia workup  - keep active type and screen -> transfuse if Hb less than 7    #HTN/HLD  - Cont amlodipine 5mg BID  - Cont toprolol 25mg qAM, 50mg qPM  - Cont synthroid 75mg qD  - Cont simvastatin 20mg qHs    #COPD  - Cont spiriva (takes incruse at home)  - Duonebs PRN        #Anxiety  - Cont cymbalta 60mg qD    #Hypothyroidism  - Cont synthroid 75mcg qAM    #MISC  - DVT PPX, Heparin  - GI PPX not necessary  - DIET: renal  - CODE: full  - Disposition: Home (24 hours observation)   89F with pmh HTN, HLD, COPD not on O2, hx lung cancer, RCC s/p L nephrectomy, hypothyroidism, anxiety who presents with fall and weakness. Found to be COVID +.    #Mechanical Fall  #Debility  - Trauma workup negative in ED  - f/u PT/OT  - Pain control PRN    #COVID+  #Productive cough  Saturating 100% on RA  CXR showing possible faint opacities on right lung field  - Cont ceftriaxone 1g q24h  - f/u procalcitonin  - No need for decadron at this time    #possible UTI  - UA positive (LLE, nitrite negative, 10-25 WBC)  - f/u U cx  - on ceftriaxone    #MANA on CKD Stage 4  Hx of RCC s/p nephrectomy  Increase creatinine likely pre-renal. UA with high specific gravity  s/p 1L IVF bolus in ED  - f/u repeat BMP in AM,   - IVF     #anemia  - Hb 7.2 MCV 93  - baseline 8.2  - anemia workup  - keep active type and screen -> transfuse if Hb less than 7    # Right mass like opacity  #hx of lung cancer  - CXR: Questionable right midlung field masslike opacity  - outpatient CT chest       #HTN/HLD  - Cont amlodipine 5mg BID  - Cont toprolol 25mg qAM, 50mg qPM  - Cont synthroid 75mg qD  - Cont simvastatin 20mg qHs    #COPD  - Cont spiriva (takes incruse at home)  - Duonebs PRN        #Anxiety  - Cont cymbalta 60mg qD    #Hypothyroidism  - Cont synthroid 75mcg qAM    #MISC  - DVT PPX, Heparin  - GI PPX not necessary  - DIET: renal  - CODE: full  - Disposition: Home (24 hours observation)

## 2021-12-23 NOTE — PHYSICAL THERAPY INITIAL EVALUATION ADULT - GENERAL OBSERVATIONS, REHAB EVAL
Pt. encountered alert and NAD, supine in bed (+)primafit (+)IV lock ,agreeable to PT IE. Pt. left as found, supine in bed (+)alarms (+)call bell in reach, NAD

## 2021-12-23 NOTE — OCCUPATIONAL THERAPY INITIAL EVALUATION ADULT - ADL RETRAINING, OT EVAL
Pt will perform UB and LB dressing with modified indep by discharge to work towards returning to PLOF

## 2021-12-24 ENCOUNTER — TRANSCRIPTION ENCOUNTER (OUTPATIENT)
Age: 86
End: 2021-12-24

## 2021-12-24 LAB
ANION GAP SERPL CALC-SCNC: 13 MMOL/L — SIGNIFICANT CHANGE UP (ref 7–14)
BASOPHILS # BLD AUTO: 0.01 K/UL — SIGNIFICANT CHANGE UP (ref 0–0.2)
BASOPHILS NFR BLD AUTO: 0.2 % — SIGNIFICANT CHANGE UP (ref 0–1)
BUN SERPL-MCNC: 40 MG/DL — HIGH (ref 10–20)
CALCIUM SERPL-MCNC: 9 MG/DL — SIGNIFICANT CHANGE UP (ref 8.5–10.1)
CHLORIDE SERPL-SCNC: 112 MMOL/L — HIGH (ref 98–110)
CO2 SERPL-SCNC: 17 MMOL/L — SIGNIFICANT CHANGE UP (ref 17–32)
CREAT SERPL-MCNC: 1.8 MG/DL — HIGH (ref 0.7–1.5)
D DIMER BLD IA.RAPID-MCNC: 582 NG/ML DDU — HIGH (ref 0–230)
EOSINOPHIL # BLD AUTO: 0.44 K/UL — SIGNIFICANT CHANGE UP (ref 0–0.7)
EOSINOPHIL NFR BLD AUTO: 8.9 % — HIGH (ref 0–8)
FERRITIN SERPL-MCNC: 857 NG/ML — HIGH (ref 15–150)
FOLATE SERPL-MCNC: 12.6 NG/ML — SIGNIFICANT CHANGE UP
GLUCOSE SERPL-MCNC: 114 MG/DL — HIGH (ref 70–99)
HCT VFR BLD CALC: 24.2 % — LOW (ref 37–47)
HGB BLD-MCNC: 7.9 G/DL — LOW (ref 12–16)
IMM GRANULOCYTES NFR BLD AUTO: 0.6 % — HIGH (ref 0.1–0.3)
LYMPHOCYTES # BLD AUTO: 1.17 K/UL — LOW (ref 1.2–3.4)
LYMPHOCYTES # BLD AUTO: 23.7 % — SIGNIFICANT CHANGE UP (ref 20.5–51.1)
MAGNESIUM SERPL-MCNC: 1.7 MG/DL — LOW (ref 1.8–2.4)
MCHC RBC-ENTMCNC: 30.7 PG — SIGNIFICANT CHANGE UP (ref 27–31)
MCHC RBC-ENTMCNC: 32.6 G/DL — SIGNIFICANT CHANGE UP (ref 32–37)
MCV RBC AUTO: 94.2 FL — SIGNIFICANT CHANGE UP (ref 81–99)
MONOCYTES # BLD AUTO: 0.68 K/UL — HIGH (ref 0.1–0.6)
MONOCYTES NFR BLD AUTO: 13.8 % — HIGH (ref 1.7–9.3)
NEUTROPHILS # BLD AUTO: 2.61 K/UL — SIGNIFICANT CHANGE UP (ref 1.4–6.5)
NEUTROPHILS NFR BLD AUTO: 52.8 % — SIGNIFICANT CHANGE UP (ref 42.2–75.2)
NRBC # BLD: 0 /100 WBCS — SIGNIFICANT CHANGE UP (ref 0–0)
PLATELET # BLD AUTO: 175 K/UL — SIGNIFICANT CHANGE UP (ref 130–400)
POTASSIUM SERPL-MCNC: 4.4 MMOL/L — SIGNIFICANT CHANGE UP (ref 3.5–5)
POTASSIUM SERPL-SCNC: 4.4 MMOL/L — SIGNIFICANT CHANGE UP (ref 3.5–5)
RBC # BLD: 2.57 M/UL — LOW (ref 4.2–5.4)
RBC # FLD: 16.4 % — HIGH (ref 11.5–14.5)
SODIUM SERPL-SCNC: 142 MMOL/L — SIGNIFICANT CHANGE UP (ref 135–146)
VIT B12 SERPL-MCNC: 826 PG/ML — SIGNIFICANT CHANGE UP (ref 232–1245)
WBC # BLD: 4.94 K/UL — SIGNIFICANT CHANGE UP (ref 4.8–10.8)
WBC # FLD AUTO: 4.94 K/UL — SIGNIFICANT CHANGE UP (ref 4.8–10.8)

## 2021-12-24 PROCEDURE — 99232 SBSQ HOSP IP/OBS MODERATE 35: CPT | Mod: GC

## 2021-12-24 RX ORDER — MAGNESIUM SULFATE 500 MG/ML
2 VIAL (ML) INJECTION ONCE
Refills: 0 | Status: COMPLETED | OUTPATIENT
Start: 2021-12-24 | End: 2021-12-24

## 2021-12-24 RX ORDER — ACETAMINOPHEN 500 MG
650 TABLET ORAL EVERY 6 HOURS
Refills: 0 | Status: DISCONTINUED | OUTPATIENT
Start: 2021-12-24 | End: 2021-12-25

## 2021-12-24 RX ADMIN — Medication 650 MILLIGRAM(S): at 15:00

## 2021-12-24 RX ADMIN — AMLODIPINE BESYLATE 5 MILLIGRAM(S): 2.5 TABLET ORAL at 17:38

## 2021-12-24 RX ADMIN — CEFTRIAXONE 100 MILLIGRAM(S): 500 INJECTION, POWDER, FOR SOLUTION INTRAMUSCULAR; INTRAVENOUS at 05:49

## 2021-12-24 RX ADMIN — HEPARIN SODIUM 5000 UNIT(S): 5000 INJECTION INTRAVENOUS; SUBCUTANEOUS at 22:02

## 2021-12-24 RX ADMIN — Medication 50 MILLIGRAM(S): at 22:03

## 2021-12-24 RX ADMIN — Medication 25 MILLIGRAM(S): at 12:38

## 2021-12-24 RX ADMIN — DULOXETINE HYDROCHLORIDE 60 MILLIGRAM(S): 30 CAPSULE, DELAYED RELEASE ORAL at 11:01

## 2021-12-24 RX ADMIN — Medication 75 MICROGRAM(S): at 05:49

## 2021-12-24 RX ADMIN — HEPARIN SODIUM 5000 UNIT(S): 5000 INJECTION INTRAVENOUS; SUBCUTANEOUS at 05:50

## 2021-12-24 RX ADMIN — AMLODIPINE BESYLATE 5 MILLIGRAM(S): 2.5 TABLET ORAL at 05:49

## 2021-12-24 RX ADMIN — HEPARIN SODIUM 5000 UNIT(S): 5000 INJECTION INTRAVENOUS; SUBCUTANEOUS at 13:33

## 2021-12-24 RX ADMIN — Medication 25 GRAM(S): at 12:32

## 2021-12-24 RX ADMIN — Medication 650 MILLIGRAM(S): at 14:30

## 2021-12-24 NOTE — DISCHARGE NOTE PROVIDER - CARE PROVIDER_API CALL
Em Be)  Geriatric Medicine; Internal Medicine  [address] [place] [address]  Phone: [phone]  Fax: [phone]  Established Patient  Follow Up Time:

## 2021-12-24 NOTE — DISCHARGE NOTE PROVIDER - NSDCMRMEDTOKEN_GEN_ALL_CORE_FT
amLODIPine 5 mg oral tablet: 1 tab(s) orally 2 times a day   DULoxetine 60 mg oral delayed release capsule: 1 cap(s) orally once a day  Incruse Ellipta 62.5 mcg/inh inhalation powder: 1 puff(s) inhaled every 24 hours  levothyroxine 75 mcg (0.075 mg) oral tablet: 1 tab(s) orally once a day  Metoprolol Tartrate 25 mg oral tablet: 1 tab(s) orally 2 times a day  simvastatin 20 mg oral tablet: 1 tab(s) orally once a day (at bedtime)   amLODIPine 5 mg oral tablet: 1 tab(s) orally 2 times a day   cefpodoxime 100 mg oral tablet: 1 tab(s) orally every 12 hours   DULoxetine 60 mg oral delayed release capsule: 1 cap(s) orally once a day  Incruse Ellipta 62.5 mcg/inh inhalation powder: 1 puff(s) inhaled every 24 hours  levothyroxine 75 mcg (0.075 mg) oral tablet: 1 tab(s) orally once a day  Metoprolol Tartrate 25 mg oral tablet: 1 tab(s) orally 2 times a day  simvastatin 20 mg oral tablet: 1 tab(s) orally once a day (at bedtime)

## 2021-12-24 NOTE — DISCHARGE NOTE PROVIDER - NSDCCPCAREPLAN_GEN_ALL_CORE_FT
PRINCIPAL DISCHARGE DIAGNOSIS  Diagnosis: 2019 novel coronavirus disease (COVID-19)  Assessment and Plan of Treatment: Coronavirus disease 2019 (COVID-19) is a respiratory illness  that can spread from person to person. The virus that causes  COVID-19 is a novel coronavirus that was first identified during  an investigation into an outbreak in [place], [place].  The virus that causes COVID-19 probably emerged from an  animal source, but is now spreading from person to person.  The virus is thought to spread mainly between people who  are in close contact with one another (within about 6 feet)  through respiratory droplets produced when an infected  person coughs or sneezes. It also may be possible that a person  can get COVID-19 by touching a surface or object that has  the virus on it and then touching their own mouth, nose, or  possibly their eyes, but this is not thought to be the main  way the virus spreads.  Please stay home and avoid contact with others for at least a week after symptoms resolve and follow government guidelines.   Patients with COVID-19 have had mild to severe respiratory  illness with symptoms of  • fever  • cough  • shortness of breath  People can help protect themselves from respiratory illness with  everyday preventive actions.    • Avoid close contact with people who are sick.  • Avoid touching your eyes, nose, and mouth with  unwashed hands.  • Wash your hands often with soap and water for at least 20   seconds. Use an alcohol-based hand  that contains at  least 60% alcohol if soap and water are not available.   Stay home when you are sick.  • Cover your cough or sneeze with a tissue, then throw the  tissue in the trash.  • Clean and disinfect frequently touched objects  and surfaces.  Call 911 and inform them you are covid positive before you decide to go to the emergency room if you have chest pain, difficulty breathing, high fevers, worsening of your symptoms, feel unwell, or have nausea and vomiting.    please follow up with our PCP      SECONDARY DISCHARGE DIAGNOSES  Diagnosis: Acute UTI  Assessment and Plan of Treatment: you were found to have a positive urine analysis -> you were started on IV antibiotics awaiting urine culture    Diagnosis: Fall  Assessment and Plan of Treatment: you presented for a fall and found to be at home -> trauma workup was negative for fracture. you should follow up with you pcp     PRINCIPAL DISCHARGE DIAGNOSIS  Diagnosis: 2019 novel coronavirus disease (COVID-19)  Assessment and Plan of Treatment: Coronavirus disease 2019 (COVID-19) is a respiratory illness  that can spread from person to person. The virus that causes  COVID-19 is a novel coronavirus that was first identified during  an investigation into an outbreak in [place], [place].  The virus that causes COVID-19 probably emerged from an  animal source, but is now spreading from person to person.  The virus is thought to spread mainly between people who  are in close contact with one another (within about 6 feet)  through respiratory droplets produced when an infected  person coughs or sneezes. It also may be possible that a person  can get COVID-19 by touching a surface or object that has  the virus on it and then touching their own mouth, nose, or  possibly their eyes, but this is not thought to be the main  way the virus spreads.  Please stay home and avoid contact with others for at least a week after symptoms resolve and follow government guidelines.   Patients with COVID-19 have had mild to severe respiratory  illness with symptoms of  • fever  • cough  • shortness of breath  People can help protect themselves from respiratory illness with  everyday preventive actions.    • Avoid close contact with people who are sick.  • Avoid touching your eyes, nose, and mouth with  unwashed hands.  • Wash your hands often with soap and water for at least 20   seconds. Use an alcohol-based hand  that contains at  least 60% alcohol if soap and water are not available.   Stay home when you are sick.  • Cover your cough or sneeze with a tissue, then throw the  tissue in the trash.  • Clean and disinfect frequently touched objects  and surfaces.  Call 911 and inform them you are covid positive before you decide to go to the emergency room if you have chest pain, difficulty breathing, high fevers, worsening of your symptoms, feel unwell, or have nausea and vomiting.    please follow up with our PCP      SECONDARY DISCHARGE DIAGNOSES  Diagnosis: Acute UTI  Assessment and Plan of Treatment: you were found to have a positive urine analysis -> you were started on IV antibiotics awaiting urine culture    Diagnosis: Fall  Assessment and Plan of Treatment: you presented for a fall and found to be at home -> trauma workup was negative for fracture. you should follow up with you pcp    Diagnosis: H/O: lung cancer  Assessment and Plan of Treatment: yo have a histoyr of lung cancer-> chest xray showed right mass like opacity -> ct chest was done     PRINCIPAL DISCHARGE DIAGNOSIS  Diagnosis: 2019 novel coronavirus disease (COVID-19)  Assessment and Plan of Treatment: Coronavirus disease 2019 (COVID-19) is a respiratory illness  that can spread from person to person. The virus that causes  COVID-19 is a novel coronavirus that was first identified during  an investigation into an outbreak in [place], [place].  The virus that causes COVID-19 probably emerged from an  animal source, but is now spreading from person to person.  The virus is thought to spread mainly between people who  are in close contact with one another (within about 6 feet)  through respiratory droplets produced when an infected  person coughs or sneezes. It also may be possible that a person  can get COVID-19 by touching a surface or object that has  the virus on it and then touching their own mouth, nose, or  possibly their eyes, but this is not thought to be the main  way the virus spreads.  Please stay home and avoid contact with others for at least a week after symptoms resolve and follow government guidelines.   Patients with COVID-19 have had mild to severe respiratory  illness with symptoms of  • fever  • cough  • shortness of breath  People can help protect themselves from respiratory illness with  everyday preventive actions.    • Avoid close contact with people who are sick.  • Avoid touching your eyes, nose, and mouth with  unwashed hands.  • Wash your hands often with soap and water for at least 20   seconds. Use an alcohol-based hand  that contains at  least 60% alcohol if soap and water are not available.   Stay home when you are sick.  • Cover your cough or sneeze with a tissue, then throw the  tissue in the trash.  • Clean and disinfect frequently touched objects  and surfaces.  Call 911 and inform them you are covid positive before you decide to go to the emergency room if you have chest pain, difficulty breathing, high fevers, worsening of your symptoms, feel unwell, or have nausea and vomiting.    please follow up with our PCP      SECONDARY DISCHARGE DIAGNOSES  Diagnosis: Acute UTI  Assessment and Plan of Treatment: you were found to have a positive urine analysis -> you were started on IV antibiotics awaiting urine culture. Culture sensitive to ceftriaxone.   Discharge on vantin 100mg twice a day x 3 days.    Diagnosis: Fall  Assessment and Plan of Treatment: you presented for a fall and found to be at home -> trauma workup was negative for fracture. you should follow up with you pcp    Diagnosis: H/O: lung cancer  Assessment and Plan of Treatment: yo have a histoyr of lung cancer-> chest xray showed right mass like opacity -> ct chest was done  3.4 x 4.3 x 2.6 cm superior segment right lower lobe mass.  Slightly peripheral 1.2 x 1.1 cm superior segment right lower lobe nodule.  Findings concerning for malignancy.      Diagnosis: Lung mass  Assessment and Plan of Treatment: CT of chest showing:   3.4 x 4.3 x 2.6 cm superior segment right lower lobe mass.  Slightly peripheral 1.2 x 1.1 cm superior segment right lower lobe nodule.  Findings concerning for malignancy.  Please follow up with your primary care doctor and pulmonologist for further treatment and management.

## 2021-12-24 NOTE — PROGRESS NOTE ADULT - ATTENDING COMMENTS
Pt feels better, no new complaints, dysuria improving. No cough, no chest pain, no SOB.     89F with pmh HTN, HLD, COPD not on O2, ?hx bladder cancer, pt denies Hx of lung cancer, RCC s/p L nephrectomy, hypothyroidism, anxiety who presents with weakness, feeling sick, +dysuria for 2 days. Pt found to be COVID + in ED, pt has been coughing since admission.     Physical exam:  NAD, not toxic looking  RESP: CTA b/l, no crackles, rhonchi  CVS: S1S2, RRR  GI: abdomen soft NT, ND  Extremities: LE no edema   NEURO: AOx3, no gross focal deficit    labs and images reviewed by me     # Weakness, dysuria, suspected UTI vs COVID. no sepsis. no respiratory failure.  - cont Rocephin for now   - f/u Ucx, Hx of ESBL UTI  - f/u inflammatory markers   - pt is saturating fine on room air, no need for Dexamethasone at the moment  - no Regeneron available in the hospital     # Mass-like opacity on CXR - pt states she had bladder cancer, not lung cancer  - CT chest done, pending result    # MANA most likely on CKD4  - cr in 2020 2.0  - creatinine down to 1.8 after gentle hydration  - oral hydration, off IVF    # Remote Hx of RCC, s/p nephrectomy    DVT ppx     Anticipated dc in 24 hrs if remains stable, pending UCx, CT chest .    Discussed plan of care with son, he agreed with the plan.

## 2021-12-24 NOTE — DISCHARGE NOTE NURSING/CASE MANAGEMENT/SOCIAL WORK - PATIENT PORTAL LINK FT
You can access the FollowMyHealth Patient Portal offered by [facility] by registering at the following website: [email or web address]. By joining [facility]’s FollowMyHealth portal, you will also be able to view your health information using other applications (apps) compatible with our system.

## 2021-12-24 NOTE — PROGRESS NOTE ADULT - ASSESSMENT
89F with pmh HTN, HLD, COPD not on O2, hx lung cancer, RCC s/p L nephrectomy, hypothyroidism, anxiety who presents with fall and weakness. Found to be COVID +.    #Mechanical Fall  #Debility  - Trauma workup negative in ED  - f/u PT/OT  - Pain control PRN    #COVID+  #Productive cough  Saturating 100% on RA  CXR showing possible faint opacities on right lung field  - Cont ceftriaxone 1g q24h  - f/u procalcitonin  - No need for decadron at this time    #possible UTI  - UA positive (LLE, nitrite negative, 10-25 WBC)  - f/u U cx : >100 K CFU Gram neg rods -> f/u susceptibilities  - on ceftriaxone    #MANA on CKD Stage 4  Hx of RCC s/p nephrectomy  Increase creatinine likely pre-renal. UA with high specific gravity  s/p 1L IVF bolus in ED  - f/u repeat BMP in AM,   - IVF     #anemia  - Hb 7.2 MCV 93  - baseline 8.2  - anemia workup  - keep active type and screen -> transfuse if Hb less than 7    # Right mass like opacity  #hx of lung cancer  - CXR: Questionable right midlung field masslike opacity  - outpatient CT chest       #HTN/HLD  - Cont amlodipine 5mg BID  - Cont toprolol 25mg qAM, 50mg qPM  - Cont synthroid 75mg qD  - Cont simvastatin 20mg qHs    #COPD  - Cont spiriva (takes incruse at home)  - Duonebs PRN        #Anxiety  - Cont cymbalta 60mg qD    #Hypothyroidism  - Cont synthroid 75mcg qAM    #MISC  - DVT PPX, Heparin  - GI PPX not necessary  - DIET: renal  - CODE: full  - Disposition: Home (24 hours observation) -> possible dc today

## 2021-12-24 NOTE — DISCHARGE NOTE PROVIDER - HOSPITAL COURSE
89F with pmh HTN, HLD, COPD not on O2, hx lung cancer, RCC s/p L nephrectomy who presents with fall. 2 night ago she was walking without her cane to the bathroom at 1am and slipped on wet towel, falling onto her bottom. Denies head trauma or loc. She felt too weak to get up, and was on the floor until her son checked on her at 10am. She was ambulatory afterwards with assistance but reports diffuse weakness. She has also had a productive cough x2d. Denies fevers, chills, abd pain, n/v/d.   trauma work up was done -> negative for fracture  found to be covid positive -> on room air  UA pos -> ceftriaxone 89F with pmh HTN, HLD, COPD not on O2, hx lung cancer, RCC s/p L nephrectomy who presents with fall. 2 night ago she was walking without her cane to the bathroom at 1am and slipped on wet towel, falling onto her bottom. Denies head trauma or loc. She felt too weak to get up, and was on the floor until her son checked on her at 10am. She was ambulatory afterwards with assistance but reports diffuse weakness. She has also had a productive cough x2d. Denies fevers, chills, abd pain, n/v/d.   trauma work up was done -> negative for fracture  found to be covid positive -> on room air. CXR -> right mass like opacity -> CT chest done  UA pos -> ceftriaxone 89F with pmh HTN, HLD, COPD not on O2, hx lung cancer, RCC s/p L nephrectomy who presents with fall. 2 night ago she was walking without her cane to the bathroom at 1am and slipped on wet towel, falling onto her bottom. Denies head trauma or loc. She felt too weak to get up, and was on the floor until her son checked on her at 10am. She was ambulatory afterwards with assistance but reports diffuse weakness. She has also had a productive cough x2d. Denies fevers, chills, abd pain, n/v/d.   trauma work up was done -> negative for fracture  found to be covid positive -> on room air. CXR -> right mass like opacity -> CT chest done  UA pos -> ceftriaxone    89F with pmh HTN, HLD, COPD not on O2, hx lung cancer, RCC s/p L nephrectomy, hypothyroidism, anxiety who presents with fall and weakness. Found to be COVID +.    #Mechanical Fall  #Debility  - Trauma workup negative in ED  - f/u PT/OT  - Pain control PRN    #COVID+  #Productive cough  Saturating 100% on RA  CXR showing possible faint opacities on right lung field  - Cont ceftriaxone 1g q24h  - f/u procalcitonin  - No need for decadron at this time    #possible UTI  - UA positive (LLE, nitrite negative, 10-25 WBC)  - f/u U cx : >100 K CFU Gram neg rods -> f/u susceptibilities --> ecoli strains sensitive to ceftriaxone.   - on ceftriaxone  -to finish vantin at home 100mg BID x 3 days to complete total of 7 day abx course.     #MANA on CKD Stage 4  Hx of RCC s/p nephrectomy  Increase creatinine likely pre-renal. UA with high specific gravity  s/p 1L IVF bolus in ED  - f/u repeat BMP in AM,   - IVF     #anemia  - Hb 7.2 MCV 93  - baseline 8.2  - anemia workup  - keep active type and screen -> transfuse if Hb less than 7    # Right mass like opacity  #hx of lung cancer  - CXR: Questionable right midlung field masslike opacity  - Ct chest findings CT Chest No Cont (12.23.21 @ 21:02)   3.4 x 4.3 x 2.6 cm superior segment right lower lobe mass.  Slightly peripheral 1.2 x 1.1 cm superior segment right lower lobe nodule.  Findings concerning for malignancy.    -pt to follow up with PCP and pulmonology about further options and management of lung findings.     #HTN/HLD  - Cont amlodipine 5mg BID  - Cont toprolol 25mg qAM, 50mg qPM  - Cont synthroid 75mg qD  - Cont simvastatin 20mg qHs    #COPD  - Cont spiriva (takes incruse at home)  - Duonebs PRN        #Anxiety  - Cont cymbalta 60mg qD    #Hypothyroidism  - Cont synthroid 75mcg qAM          Pt labs, vitals, and PE stable for discharged. pt to follow up with her PCP and pulm docs for further management and/or treatment of her new R lung findings on CT. Discharged on vantin 100mg x 3 days to finish 7 day total course of abx for UTI; sensitives showing 2 strains of e. coli both sensitive to ceftriaxone.     Med rec reviewed with primary medical attending.

## 2021-12-24 NOTE — DISCHARGE NOTE PROVIDER - BRAND OF COVID-19 VACCINATION
Moderna dose 1 and 2
Detail Level: Generalized
Quality 130: Documentation Of Current Medications In The Medical Record: Current Medications Documented
Quality 110: Preventive Care And Screening: Influenza Immunization: Influenza Immunization not Administered because Patient Refused.

## 2021-12-24 NOTE — DISCHARGE NOTE NURSING/CASE MANAGEMENT/SOCIAL WORK - NSDCPEFALRISK_GEN_ALL_CORE
For information on Fall & Injury Prevention, visit: [email or web address] OR  [email or web address] OR  https://www.cdc.gov/steadi/patient.html

## 2021-12-24 NOTE — DISCHARGE NOTE PROVIDER - NSDCFUADDAPPT_GEN_ALL_CORE_FT
Please follow up with your primary care doctor and pulmonologist for further treatment and management.     Please take your antibiotic as prescribed.

## 2021-12-25 VITALS
TEMPERATURE: 98 F | DIASTOLIC BLOOD PRESSURE: 70 MMHG | RESPIRATION RATE: 18 BRPM | HEART RATE: 65 BPM | SYSTOLIC BLOOD PRESSURE: 160 MMHG

## 2021-12-25 LAB
-  AMIKACIN: SIGNIFICANT CHANGE UP
-  AMIKACIN: SIGNIFICANT CHANGE UP
-  AMOXICILLIN/CLAVULANIC ACID: SIGNIFICANT CHANGE UP
-  AMOXICILLIN/CLAVULANIC ACID: SIGNIFICANT CHANGE UP
-  AMPICILLIN/SULBACTAM: SIGNIFICANT CHANGE UP
-  AMPICILLIN/SULBACTAM: SIGNIFICANT CHANGE UP
-  AMPICILLIN: SIGNIFICANT CHANGE UP
-  AMPICILLIN: SIGNIFICANT CHANGE UP
-  AZTREONAM: SIGNIFICANT CHANGE UP
-  AZTREONAM: SIGNIFICANT CHANGE UP
-  CEFAZOLIN: SIGNIFICANT CHANGE UP
-  CEFAZOLIN: SIGNIFICANT CHANGE UP
-  CEFEPIME: SIGNIFICANT CHANGE UP
-  CEFEPIME: SIGNIFICANT CHANGE UP
-  CEFOXITIN: SIGNIFICANT CHANGE UP
-  CEFOXITIN: SIGNIFICANT CHANGE UP
-  CEFTRIAXONE: SIGNIFICANT CHANGE UP
-  CEFTRIAXONE: SIGNIFICANT CHANGE UP
-  CIPROFLOXACIN: SIGNIFICANT CHANGE UP
-  CIPROFLOXACIN: SIGNIFICANT CHANGE UP
-  ERTAPENEM: SIGNIFICANT CHANGE UP
-  ERTAPENEM: SIGNIFICANT CHANGE UP
-  GENTAMICIN: SIGNIFICANT CHANGE UP
-  GENTAMICIN: SIGNIFICANT CHANGE UP
-  IMIPENEM: SIGNIFICANT CHANGE UP
-  IMIPENEM: SIGNIFICANT CHANGE UP
-  LEVOFLOXACIN: SIGNIFICANT CHANGE UP
-  LEVOFLOXACIN: SIGNIFICANT CHANGE UP
-  MEROPENEM: SIGNIFICANT CHANGE UP
-  MEROPENEM: SIGNIFICANT CHANGE UP
-  NITROFURANTOIN: SIGNIFICANT CHANGE UP
-  NITROFURANTOIN: SIGNIFICANT CHANGE UP
-  PIPERACILLIN/TAZOBACTAM: SIGNIFICANT CHANGE UP
-  PIPERACILLIN/TAZOBACTAM: SIGNIFICANT CHANGE UP
-  TIGECYCLINE: SIGNIFICANT CHANGE UP
-  TIGECYCLINE: SIGNIFICANT CHANGE UP
-  TOBRAMYCIN: SIGNIFICANT CHANGE UP
-  TOBRAMYCIN: SIGNIFICANT CHANGE UP
-  TRIMETHOPRIM/SULFAMETHOXAZOLE: SIGNIFICANT CHANGE UP
-  TRIMETHOPRIM/SULFAMETHOXAZOLE: SIGNIFICANT CHANGE UP
ANION GAP SERPL CALC-SCNC: 13 MMOL/L — SIGNIFICANT CHANGE UP (ref 7–14)
BASOPHILS # BLD AUTO: 0.03 K/UL — SIGNIFICANT CHANGE UP (ref 0–0.2)
BASOPHILS NFR BLD AUTO: 0.6 % — SIGNIFICANT CHANGE UP (ref 0–1)
BUN SERPL-MCNC: 30 MG/DL — HIGH (ref 10–20)
CALCIUM SERPL-MCNC: 9.4 MG/DL — SIGNIFICANT CHANGE UP (ref 8.5–10.1)
CHLORIDE SERPL-SCNC: 110 MMOL/L — SIGNIFICANT CHANGE UP (ref 98–110)
CO2 SERPL-SCNC: 18 MMOL/L — SIGNIFICANT CHANGE UP (ref 17–32)
CREAT SERPL-MCNC: 1.7 MG/DL — HIGH (ref 0.7–1.5)
CULTURE RESULTS: SIGNIFICANT CHANGE UP
EOSINOPHIL # BLD AUTO: 0.42 K/UL — SIGNIFICANT CHANGE UP (ref 0–0.7)
EOSINOPHIL NFR BLD AUTO: 8.5 % — HIGH (ref 0–8)
GLUCOSE SERPL-MCNC: 103 MG/DL — HIGH (ref 70–99)
HCT VFR BLD CALC: 25.1 % — LOW (ref 37–47)
HGB BLD-MCNC: 8.2 G/DL — LOW (ref 12–16)
IMM GRANULOCYTES NFR BLD AUTO: 0.6 % — HIGH (ref 0.1–0.3)
LYMPHOCYTES # BLD AUTO: 1.13 K/UL — LOW (ref 1.2–3.4)
LYMPHOCYTES # BLD AUTO: 22.8 % — SIGNIFICANT CHANGE UP (ref 20.5–51.1)
MAGNESIUM SERPL-MCNC: 2 MG/DL — SIGNIFICANT CHANGE UP (ref 1.8–2.4)
MCHC RBC-ENTMCNC: 30.9 PG — SIGNIFICANT CHANGE UP (ref 27–31)
MCHC RBC-ENTMCNC: 32.7 G/DL — SIGNIFICANT CHANGE UP (ref 32–37)
MCV RBC AUTO: 94.7 FL — SIGNIFICANT CHANGE UP (ref 81–99)
METHOD TYPE: SIGNIFICANT CHANGE UP
METHOD TYPE: SIGNIFICANT CHANGE UP
MONOCYTES # BLD AUTO: 0.6 K/UL — SIGNIFICANT CHANGE UP (ref 0.1–0.6)
MONOCYTES NFR BLD AUTO: 12.1 % — HIGH (ref 1.7–9.3)
NEUTROPHILS # BLD AUTO: 2.74 K/UL — SIGNIFICANT CHANGE UP (ref 1.4–6.5)
NEUTROPHILS NFR BLD AUTO: 55.4 % — SIGNIFICANT CHANGE UP (ref 42.2–75.2)
NRBC # BLD: 0 /100 WBCS — SIGNIFICANT CHANGE UP (ref 0–0)
ORGANISM # SPEC MICROSCOPIC CNT: SIGNIFICANT CHANGE UP
PLATELET # BLD AUTO: 189 K/UL — SIGNIFICANT CHANGE UP (ref 130–400)
POTASSIUM SERPL-MCNC: 4.5 MMOL/L — SIGNIFICANT CHANGE UP (ref 3.5–5)
POTASSIUM SERPL-SCNC: 4.5 MMOL/L — SIGNIFICANT CHANGE UP (ref 3.5–5)
RBC # BLD: 2.65 M/UL — LOW (ref 4.2–5.4)
RBC # FLD: 16.3 % — HIGH (ref 11.5–14.5)
SODIUM SERPL-SCNC: 141 MMOL/L — SIGNIFICANT CHANGE UP (ref 135–146)
SPECIMEN SOURCE: SIGNIFICANT CHANGE UP
WBC # BLD: 4.95 K/UL — SIGNIFICANT CHANGE UP (ref 4.8–10.8)
WBC # FLD AUTO: 4.95 K/UL — SIGNIFICANT CHANGE UP (ref 4.8–10.8)

## 2021-12-25 PROCEDURE — 99239 HOSP IP/OBS DSCHRG MGMT >30: CPT

## 2021-12-25 RX ORDER — CEFPODOXIME PROXETIL 100 MG
1 TABLET ORAL
Qty: 6 | Refills: 0
Start: 2021-12-25 | End: 2021-12-27

## 2021-12-25 RX ADMIN — HEPARIN SODIUM 5000 UNIT(S): 5000 INJECTION INTRAVENOUS; SUBCUTANEOUS at 13:13

## 2021-12-25 RX ADMIN — CEFTRIAXONE 100 MILLIGRAM(S): 500 INJECTION, POWDER, FOR SOLUTION INTRAMUSCULAR; INTRAVENOUS at 05:32

## 2021-12-25 RX ADMIN — HEPARIN SODIUM 5000 UNIT(S): 5000 INJECTION INTRAVENOUS; SUBCUTANEOUS at 05:32

## 2021-12-25 RX ADMIN — Medication 25 MILLIGRAM(S): at 05:32

## 2021-12-25 RX ADMIN — DULOXETINE HYDROCHLORIDE 60 MILLIGRAM(S): 30 CAPSULE, DELAYED RELEASE ORAL at 11:22

## 2021-12-25 RX ADMIN — Medication 75 MICROGRAM(S): at 05:32

## 2021-12-25 RX ADMIN — AMLODIPINE BESYLATE 5 MILLIGRAM(S): 2.5 TABLET ORAL at 05:32

## 2021-12-25 NOTE — PROGRESS NOTE ADULT - SUBJECTIVE AND OBJECTIVE BOX
DRAKE QUIÑONES 89y Female  MRN#: [id number]     Hospital Day: 1d    Pt is currently admitted with the primary diagnosis of mechanical fall + COVID    SUBJECTIVE    No Overnight events     No Subjective complaints     patient is seen and examined    Present Today:   - Denis:  No                                          ----------------------------------------------------------  OBJECTIVE  PAST MEDICAL & SURGICAL HISTORY  Cancer  Bladder 1981    HTN (hypertension)    Other type of osteoarthritis    Colitis  with diarrhea    COPD with emphysema    Hypothyroidism    History of hip replacement, total, right    History of total left knee replacement (TKR)    History of nephrectomy  Left    Malignant neoplasm of urinary bladder, unspecified site  Removal x 3    H/O breast surgery  25% calcification removed    History of lung surgery                                              -----------------------------------------------------------  ALLERGIES:  Contrast Dye (Hives; Rash)  sulfa drugs (Urticaria)                                            ------------------------------------------------------------    HOME MEDICATIONS  Home Medications:  DULoxetine 60 mg oral delayed release capsule: 1 cap(s) orally once a day (22 Dec 2021 17:44)  Incruse Ellipta 62.5 mcg/inh inhalation powder: 1 puff(s) inhaled every 24 hours (22 Dec 2021 17:44)  levothyroxine 75 mcg (0.075 mg) oral tablet: 1 tab(s) orally once a day (22 Dec 2021 17:44)  Metoprolol Tartrate 25 mg oral tablet: 1 tab(s) orally 2 times a day (22 Dec 2021 17:44)  simvastatin 20 mg oral tablet: 1 tab(s) orally once a day (at bedtime) (22 Dec 2021 17:44)                           MEDICATIONS:  STANDING MEDICATIONS  amLODIPine Oral Tab/Cap - Peds 5 milliGRAM(s) Oral two times a day  cefTRIAXone   IVPB 1000 milliGRAM(s) IV Intermittent every 24 hours  DULoxetine 60 milliGRAM(s) Oral daily  heparin   Injectable 5000 Unit(s) SubCutaneous every 8 hours  levothyroxine 75 MICROGram(s) Oral daily  metoprolol tartrate 25 milliGRAM(s) Oral daily  metoprolol tartrate 50 milliGRAM(s) Oral at bedtime  simvastatin 20 milliGRAM(s) Oral at bedtime  sodium chloride 0.9%. 1000 milliLiter(s) IV Continuous <Continuous>  tiotropium 18 MICROgram(s) Capsule 1 Capsule(s) Inhalation daily    PRN MEDICATIONS  guaifenesin/dextromethorphan Oral Liquid 10 milliLiter(s) Oral every 8 hours PRN                                            ------------------------------------------------------------  VITAL SIGNS: Last 24 Hours  T(C): 36.4 (23 Dec 2021 05:49), Max: 36.4 (23 Dec 2021 05:49)  T(F): 97.5 (23 Dec 2021 05:49), Max: 97.5 (23 Dec 2021 05:49)  HR: 72 (23 Dec 2021 05:49) (61 - 72)  BP: 145/78 (23 Dec 2021 05:49) (102/60 - 164/75)  BP(mean): --  RR: 18 (23 Dec 2021 05:49) (18 - 18)  SpO2: 92% (23 Dec 2021 09:03) (92% - 100%)                                             --------------------------------------------------------------  LABS:                        7.4    5.87  )-----------( 185      ( 22 Dec 2021 15:20 )             22.9     12-22    134<L>  |  104  |  60<H>  ----------------------------<  137<H>  4.3   |  20  |  2.9<H>    Ca    9.3      22 Dec 2021 15:20    TPro  6.4  /  Alb  3.8  /  TBili  0.2  /  DBili  x   /  AST  16  /  ALT  11  /  AlkPhos  76  12-22    PT/INR - ( 22 Dec 2021 15:20 )   PT: 11.80 sec;   INR: 1.03 ratio         PTT - ( 22 Dec 2021 15:20 )  PTT:32.5 sec  Urinalysis Basic - ( 22 Dec 2021 16:05 )    Color: Yellow / Appearance: Cloudy / SG: >=1.030 / pH: x  Gluc: x / Ketone: Negative  / Bili: Negative / Urobili: 0.2 mg/dL   Blood: x / Protein: >=300 mg/dL / Nitrite: Negative   Leuk Esterase: Large / RBC: 6-10 /HPF / WBC 10-25 /HPF   Sq Epi: x / Non Sq Epi: Few /HPF / Bacteria: TNTC /HPF        Troponin T, Serum: 0.01 ng/mL (12-22-21 @ 15:20)  Creatine Kinase, Serum: 70 U/L (12-22-21 @ 15:20)          CARDIAC MARKERS ( 22 Dec 2021 15:20 )  x     / 0.01 ng/mL / 70 U/L / x     / x                                                  -------------------------------------------------------------  RADIOLOGY:                                            --------------------------------------------------------------    PHYSICAL EXAM:    GENERAL: NAD  CHEST/LUNG: Clear to auscultation b/l  HEART: Regular rate and rhythm; No murmurs, rubs, or gallops  ABDOMEN: Soft, Nontender, Nondistended; Bowel sounds present  EXTREMITIES:  2+ Peripheral Pulses, No clubbing, cyanosis, or edema  PSYCH: AAOx3    
  DRAKE QUI[facility]ONES  [facility]      Patient was evaluated and examined  by bedside, no active complains        REVIEW OF SYSTEMS:  please see pertinent positives mentioned above, all other 12 ROS negative      T(C): , Max: 36.2 (12-25-21 @ 05:59)  HR: 52 (12-25-21 @ 05:59)  BP: 160/70 (12-25-21 @ 05:59)  RR: 18 (12-25-21 @ 05:59)  SpO2: 95% (12-25-21 @ 08:04)  CAPILLARY BLOOD GLUCOSE          PHYSICAL EXAM:  General: NAD, AAOX3, patient is laying comfortably in bed  HEENT: AT, NC, Supple, NO JVD, NO CB  Lungs: CTA B/L, no wheezing, no rhonchi  CVS: normal S1, S2, RRR, NO M/G/R  Abdomen: soft, bowel sounds present, non-tender, non-distended  Extremities: no edema, no clubbing, no cyanosis, positive peripheral pulses b/l  Neuro: no acute focal neurological deficits  Skin: no rash, no ecchymosis      LAB  CBC  Date: 12-25-21 @ 04:30  Mean cell Lfqhxtqqce21.9  Mean cell Hemoglobin Conc32.7  Mean cell Volum 94.7  Platelet count-Automate 189  RBC Count 2.65  Red Cell Distrib Width16.3  WBC Count4.95  % Albumin, Urine--  Hematocrit 25.1  Hemoglobin 8.2  CBC  Date: 12-24-21 @ 07:00  Mean cell Dsfctyendp57.7  Mean cell Hemoglobin Conc32.6  Mean cell Volum 94.2  Platelet count-Automate 175  RBC Count 2.57  Red Cell Distrib Width16.4  WBC Count4.94  % Albumin, Urine--  Hematocrit 24.2  Hemoglobin 7.9  CBC  Date: 12-23-21 @ 18:07  Mean cell Ayogjckype39.2  Mean cell Hemoglobin Conc33.1  Mean cell Volum 94.4  Platelet count-Automate 182  RBC Count 2.50  Red Cell Distrib Width16.6  WBC Count5.61  % Albumin, Urine--  Hematocrit 23.6  Hemoglobin 7.8  CBC  Date: 12-22-21 @ 15:20  Mean cell Xikfzkbrar22.2  Mean cell Hemoglobin Conc32.3  Mean cell Volum 93.5  Platelet count-Automate 185  RBC Count 2.45  Red Cell Distrib Width16.9  WBC Count5.87  % Albumin, Urine--  Hematocrit 22.9  Hemoglobin 7.4    [facility]  12-25-21 @ 04:30  Blood Gas Arterial-Calcium,Ionized--  Blood Urea Nitrogen, Serum 30 mg/dL<H> [10 - 20]  Carbon Dioxide, Serum18 mmol/L [17 - 32]  Chloride, Yvqpw208 mmol/L [98 - 110]  Creatinie, Serum1.7 mg/dL<H> [0.7 - 1.5]  Glucose, Cphzr731 mg/dL<H> [70 - 99]  Potassium, Serum4.5 mmol/L [3.5 - 5.0]  Sodium, Serum 141 mmol/L [135 - 146]  [facility]  12-24-21 @ 07:00  Blood Gas Arterial-Calcium,Ionized--  Blood Urea Nitrogen, Serum 40 mg/dL<H> [10 - 20]  Carbon Dioxide, Serum17 mmol/L [17 - 32]  Chloride, Hlgcb238 mmol/L<H> [98 - 110]  Creatinie, Serum1.8 mg/dL<H> [0.7 - 1.5]  Glucose, Oelal809 mg/dL<H> [70 - 99]  Potassium, Serum4.4 mmol/L [3.5 - 5.0]  Sodium, Serum 142 mmol/L [135 - 146]  [facility]  12-23-21 @ 18:07  Blood Gas Arterial-Calcium,Ionized--  Blood Urea Nitrogen, Serum 45 mg/dL<H> [10 - 20]  Carbon Dioxide, Serum16 mmol/L<L> [17 - 32]  Chloride, Gtqgx715 mmol/L<H> [98 - 110]  Creatinie, Serum1.8 mg/dL<H> [0.7 - 1.5]  Glucose, Qxxqg405 mg/dL<H> [70 - 99]  Potassium, Serum4.3 mmol/L [3.5 - 5.0]  Sodium, Serum 142 mmol/L [135 - 146]  [facility]  12-22-21 @ 15:20  Blood Gas Arterial-Calcium,Ionized--  Blood Urea Nitrogen, Serum 60 mg/dL<H> [10 - 20]  Carbon Dioxide, Serum20 mmol/L [17 - 32]  Chloride, Frgih281 mmol/L [98 - 110]  Creatinie, Serum2.9 mg/dL<H> [0.7 - 1.5]  Glucose, Bsnyu338 mg/dL<H> [70 - 99]  Potassium, Serum4.3 mmol/L [3.5 - 5.0]  Sodium, Serum 134 mmol/L<L> [135 - 146]              Microbiology:    Culture - Urine (collected 12-22-21 @ 16:05)  Source: Clean Catch Clean Catch (Midstream)  Final Report (12-25-21 @ 13:34):    >100,000 CFU/ml Escherichia coli    >100,000 CFU/ml Escherichia coli #2  Organism: Escherichia coli  Escherichia coli (12-25-21 @ 13:34)  Organism: Escherichia coli (12-25-21 @ 13:34)      -  Amikacin: S <=16      -  Amoxicillin/Clavulanic Acid: S <=8/4 Consider reserving for cystitis when ampicillin/sulbactam is resistant      -  Ampicillin: R >16 These ampicillin results predict results for amoxicillin      -  Ampicillin/Sulbactam: R >16/8 Enterobacter, Klebsiella aerogenes, Citrobacter, and Serratia may develop resistance during prolonged therapy (3-4 days)      -  Aztreonam: S <=4      -  Cefazolin: S <=2 (MIC_CL_COM_ENTERIC_CEFAZU) For uncomplicated UTI with K. pneumoniae, E. coli, or P. mirablis: GEOVANNA <=16 is sensitive and GEOVANNA >=32 is resistant. This also predicts results for oral agents cefaclor, cefdinir, cefpodoxime, cefprozil, cefuroxime axetil, cephalexin and locarbef for uncomplicated UTI. Note that some isolates may be susceptible to these agents while testing resistant to cefazolin.      -  Cefepime: S <=2      -  Cefoxitin: S <=8      -  Ceftriaxone: S <=1 Enterobacter, Klebsiella aerogenes, Citrobacter, and Serratia may develop resistance during prolonged therapy      -  Ciprofloxacin: R >2      -  Ertapenem: S <=0.5      -  Gentamicin: S <=2      -  Imipenem: S <=1      -  Levofloxacin: R >4      -  Meropenem: S <=1      -  Nitrofurantoin: S <=32 Should not be used to treat pyelonephritis      -  Piperacillin/Tazobactam: S <=8      -  Tigecycline: S <=2      -  Tobramycin: S <=2      -  Trimethoprim/Sulfamethoxazole: R >2/38      Method Type: GEOVANNA  Organism: Escherichia coli (12-25-21 @ 13:34)      -  Amikacin: S <=16      -  Amoxicillin/Clavulanic Acid: S <=8/4      -  Ampicillin: R >16 These ampicillin results predict results for amoxicillin      -  Ampicillin/Sulbactam: I 16/8 Enterobacter, Klebsiella aerogenes, Citrobacter, and Serratia may develop resistance during prolonged therapy (3-4 days)      -  Aztreonam: S <=4      -  Cefazolin: S 4 (MIC_CL_COM_ENTERIC_CEFAZU) For uncomplicated UTI with K. pneumoniae, E. coli, or P. mirablis: GEOVANNA <=16 is sensitive and GEOVANNA >=32 is resistant. This also predicts results for oral agents cefaclor, cefdinir, cefpodoxime, cefprozil, cefuroxime axetil, cephalexin and locarbef for uncomplicated UTI. Note that some isolates may be susceptible to these agents while testing resistant to cefazolin.      -  Cefepime: S <=2      -  Cefoxitin: S <=8      -  Ceftriaxone: S <=1 Enterobacter, Klebsiella aerogenes, Citrobacter, and Serratia may develop resistance during prolonged therapy      -  Ciprofloxacin: R >2      -  Ertapenem: S <=0.5      -  Gentamicin: S <=2      -  Imipenem: S <=1      -  Levofloxacin: R >4      -  Meropenem: S <=1      -  Nitrofurantoin: S <=32 Should not be used to treat pyelonephritis      -  Piperacillin/Tazobactam: S <=8      -  Tigecycline: S <=2      -  Tobramycin: S <=2      -  Trimethoprim/Sulfamethoxazole: R >2/38      Method Type: GEOVANNA        Medications:  acetaminophen     Tablet .. 650 milliGRAM(s) Oral every 6 hours PRN  amLODIPine Oral Tab/Cap - Peds 5 milliGRAM(s) Oral two times a day  cefTRIAXone   IVPB 1000 milliGRAM(s) IV Intermittent every 24 hours  DULoxetine 60 milliGRAM(s) Oral daily  guaifenesin/dextromethorphan Oral Liquid 10 milliLiter(s) Oral every 8 hours PRN  heparin   Injectable 5000 Unit(s) SubCutaneous every 8 hours  levothyroxine 75 MICROGram(s) Oral daily  metoprolol tartrate 25 milliGRAM(s) Oral daily  metoprolol tartrate 50 milliGRAM(s) Oral at bedtime  simvastatin 20 milliGRAM(s) Oral at bedtime  tiotropium 18 MICROgram(s) Capsule 1 Capsule(s) Inhalation daily        Assessment and Plan:  89F with pmh HTN, HLD, COPD not on O2, ?hx bladder cancer, pt denies Hx of lung cancer, RCC s/p L nephrectomy, hypothyroidism, anxiety who presents with weakness, feeling sick, +dysuria for 2 days. Pt found to be COVID + in ED, pt has been coughing since admission.       # Weakness, dysuria due to acute  UTI    - patient started on IV Rocephin  - urine cxs- E.Coli Sensitive to Rocephin- will switch to PO Vantin 100 mg po twice daily to complete total of 7 days course    # Covid PCR positive  -clinically no symptoms  -outpatient quarantine for 10 days /observation    # Mass-like opacity on CXR - pt states she had bladder cancer, not lung cancer  - CT chest confirms abnormal lung mass with suspected neoplasm, patient was informed of abnormal finding and referred to f/up with PCP for further work up post d/c home     # MANA most likely on CKD4  - creatinine level improved, at baseline.   -outpatient BMP monitoring  -renal diet.    # Anemia of renal disease- daily MVI- CBC monitoring with PCP    # Remote Hx of RCC, s/p nephrectomy    DVT ppx     #Progress Note Handoff: Patient was medically optimized, stable for d/c home today   Family discussion: Patient verbalized good understanding of discharge instructions and agreed with discharge plan.  Disposition: Home__today     Total time spent to complete patient's bedside assessment, review medical chart, discuss discharge  plan of care with covering medical team was more than 35 minutes                
DRAKE QUIÑONES 89y Female  MRN#: [id number]     Hospital Day: 2d    Pt is currently admitted with the primary diagnosis of mechanical fall + COVID    SUBJECTIVE    No Overnight events     No Subjective complaints     patient is seen and examined    Present Today:   - Denis:  No                                            ----------------------------------------------------------  OBJECTIVE  PAST MEDICAL & SURGICAL HISTORY  Cancer  Bladder 1981    HTN (hypertension)    Other type of osteoarthritis    Colitis  with diarrhea    COPD with emphysema    Hypothyroidism    History of hip replacement, total, right    History of total left knee replacement (TKR)    History of nephrectomy  Left    Malignant neoplasm of urinary bladder, unspecified site  Removal x 3    H/O breast surgery  25% calcification removed    History of lung surgery                                              -----------------------------------------------------------  ALLERGIES:  Contrast Dye (Hives; Rash)  sulfa drugs (Urticaria)                                            ------------------------------------------------------------    HOME MEDICATIONS  Home Medications:  DULoxetine 60 mg oral delayed release capsule: 1 cap(s) orally once a day (22 Dec 2021 17:44)  Incruse Ellipta 62.5 mcg/inh inhalation powder: 1 puff(s) inhaled every 24 hours (22 Dec 2021 17:44)  levothyroxine 75 mcg (0.075 mg) oral tablet: 1 tab(s) orally once a day (22 Dec 2021 17:44)  Metoprolol Tartrate 25 mg oral tablet: 1 tab(s) orally 2 times a day (22 Dec 2021 17:44)  simvastatin 20 mg oral tablet: 1 tab(s) orally once a day (at bedtime) (22 Dec 2021 17:44)                           MEDICATIONS:  STANDING MEDICATIONS  amLODIPine Oral Tab/Cap - Peds 5 milliGRAM(s) Oral two times a day  cefTRIAXone   IVPB 1000 milliGRAM(s) IV Intermittent every 24 hours  DULoxetine 60 milliGRAM(s) Oral daily  heparin   Injectable 5000 Unit(s) SubCutaneous every 8 hours  levothyroxine 75 MICROGram(s) Oral daily  metoprolol tartrate 25 milliGRAM(s) Oral daily  metoprolol tartrate 50 milliGRAM(s) Oral at bedtime  simvastatin 20 milliGRAM(s) Oral at bedtime  tiotropium 18 MICROgram(s) Capsule 1 Capsule(s) Inhalation daily    PRN MEDICATIONS  guaifenesin/dextromethorphan Oral Liquid 10 milliLiter(s) Oral every 8 hours PRN                                            ------------------------------------------------------------  VITAL SIGNS: Last 24 Hours  T(C): 36.1 (24 Dec 2021 06:12), Max: 36.1 (24 Dec 2021 06:12)  T(F): 96.9 (24 Dec 2021 06:12), Max: 96.9 (24 Dec 2021 06:12)  HR: 60 (24 Dec 2021 06:12) (54 - 60)  BP: 156/68 (23 Dec 2021 12:50) (156/68 - 156/68)  BP(mean): --  RR: 18 (24 Dec 2021 07:47) (18 - 18)  SpO2: 95% (24 Dec 2021 07:47) (95% - 98%)      12-23-21 @ 07:01  -  12-24-21 @ 07:00  --------------------------------------------------------  IN: 800 mL / OUT: 600 mL / NET: 200 mL                                             --------------------------------------------------------------  LABS:                        7.9    4.94  )-----------( 175      ( 24 Dec 2021 07:00 )             24.2     12-24    142  |  112<H>  |  40<H>  ----------------------------<  114<H>  4.4   |  17  |  1.8<H>    Ca    9.0      24 Dec 2021 07:00  Mg     1.7     12-24    TPro  6.2  /  Alb  3.7  /  TBili  <0.2  /  DBili  x   /  AST  15  /  ALT  11  /  AlkPhos  79  12-23    PT/INR - ( 22 Dec 2021 15:20 )   PT: 11.80 sec;   INR: 1.03 ratio         PTT - ( 22 Dec 2021 15:20 )  PTT:32.5 sec  Urinalysis Basic - ( 22 Dec 2021 16:05 )    Color: Yellow / Appearance: Cloudy / SG: >=1.030 / pH: x  Gluc: x / Ketone: Negative  / Bili: Negative / Urobili: 0.2 mg/dL   Blood: x / Protein: >=300 mg/dL / Nitrite: Negative   Leuk Esterase: Large / RBC: 6-10 /HPF / WBC 10-25 /HPF   Sq Epi: x / Non Sq Epi: Few /HPF / Bacteria: TNTC /HPF              Culture - Urine (collected 22 Dec 2021 16:05)  Source: Clean Catch Clean Catch (Midstream)  Preliminary Report (24 Dec 2021 07:57):    >100,000 CFU/ml Gram Negative Rods        CARDIAC MARKERS ( 22 Dec 2021 15:20 )  x     / 0.01 ng/mL / 70 U/L / x     / x                                                  -------------------------------------------------------------  RADIOLOGY:                                            --------------------------------------------------------------    PHYSICAL EXAM:    GENERAL: NAD  CHEST/LUNG: Clear to auscultation b/l  HEART: Regular rate and rhythm; No murmurs, rubs, or gallops  ABDOMEN: Soft, Nontender, Nondistended; Bowel sounds present  EXTREMITIES:  2+ Peripheral Pulses, No clubbing, cyanosis, or edema  PSYCH: AAOx3

## 2021-12-26 ENCOUNTER — TRANSCRIPTION ENCOUNTER (OUTPATIENT)
Age: 86
End: 2021-12-26

## 2021-12-27 ENCOUNTER — TRANSCRIPTION ENCOUNTER (OUTPATIENT)
Age: 86
End: 2021-12-27

## 2022-01-03 DIAGNOSIS — Z85.118 PERSONAL HISTORY OF OTHER MALIGNANT NEOPLASM OF BRONCHUS AND LUNG: ICD-10-CM

## 2022-01-03 DIAGNOSIS — U07.1 COVID-19: ICD-10-CM

## 2022-01-03 DIAGNOSIS — E03.9 HYPOTHYROIDISM, UNSPECIFIED: ICD-10-CM

## 2022-01-03 DIAGNOSIS — Z88.2 ALLERGY STATUS TO SULFONAMIDES: ICD-10-CM

## 2022-01-03 DIAGNOSIS — N17.9 ACUTE KIDNEY FAILURE, UNSPECIFIED: ICD-10-CM

## 2022-01-03 DIAGNOSIS — Z85.528 PERSONAL HISTORY OF OTHER MALIGNANT NEOPLASM OF KIDNEY: ICD-10-CM

## 2022-01-03 DIAGNOSIS — N39.0 URINARY TRACT INFECTION, SITE NOT SPECIFIED: ICD-10-CM

## 2022-01-03 DIAGNOSIS — F41.9 ANXIETY DISORDER, UNSPECIFIED: ICD-10-CM

## 2022-01-03 DIAGNOSIS — Z91.041 RADIOGRAPHIC DYE ALLERGY STATUS: ICD-10-CM

## 2022-01-03 DIAGNOSIS — I12.9 HYPERTENSIVE CHRONIC KIDNEY DISEASE WITH STAGE 1 THROUGH STAGE 4 CHRONIC KIDNEY DISEASE, OR UNSPECIFIED CHRONIC KIDNEY DISEASE: ICD-10-CM

## 2022-01-03 DIAGNOSIS — D64.9 ANEMIA, UNSPECIFIED: ICD-10-CM

## 2022-01-03 DIAGNOSIS — Z87.891 PERSONAL HISTORY OF NICOTINE DEPENDENCE: ICD-10-CM

## 2022-01-03 DIAGNOSIS — Z96.641 PRESENCE OF RIGHT ARTIFICIAL HIP JOINT: ICD-10-CM

## 2022-01-03 DIAGNOSIS — R54 AGE-RELATED PHYSICAL DEBILITY: ICD-10-CM

## 2022-01-03 DIAGNOSIS — Z90.5 ACQUIRED ABSENCE OF KIDNEY: ICD-10-CM

## 2022-01-03 DIAGNOSIS — E78.5 HYPERLIPIDEMIA, UNSPECIFIED: ICD-10-CM

## 2022-01-03 DIAGNOSIS — R91.8 OTHER NONSPECIFIC ABNORMAL FINDING OF LUNG FIELD: ICD-10-CM

## 2022-01-03 DIAGNOSIS — Z91.81 HISTORY OF FALLING: ICD-10-CM

## 2022-01-03 DIAGNOSIS — J44.9 CHRONIC OBSTRUCTIVE PULMONARY DISEASE, UNSPECIFIED: ICD-10-CM

## 2022-01-03 DIAGNOSIS — N18.4 CHRONIC KIDNEY DISEASE, STAGE 4 (SEVERE): ICD-10-CM

## 2022-01-24 ENCOUNTER — RESULT REVIEW (OUTPATIENT)
Age: 87
End: 2022-01-24

## 2022-01-24 ENCOUNTER — OUTPATIENT (OUTPATIENT)
Dept: OUTPATIENT SERVICES | Facility: HOSPITAL | Age: 87
LOS: 1 days | Discharge: HOME | End: 2022-01-24
Payer: MEDICARE

## 2022-01-24 DIAGNOSIS — C67.9 MALIGNANT NEOPLASM OF BLADDER, UNSPECIFIED: Chronic | ICD-10-CM

## 2022-01-24 DIAGNOSIS — Z98.890 OTHER SPECIFIED POSTPROCEDURAL STATES: Chronic | ICD-10-CM

## 2022-01-24 DIAGNOSIS — R22.2 LOCALIZED SWELLING, MASS AND LUMP, TRUNK: ICD-10-CM

## 2022-01-24 DIAGNOSIS — Z90.5 ACQUIRED ABSENCE OF KIDNEY: Chronic | ICD-10-CM

## 2022-01-24 DIAGNOSIS — Z96.641 PRESENCE OF RIGHT ARTIFICIAL HIP JOINT: Chronic | ICD-10-CM

## 2022-01-24 DIAGNOSIS — Z96.652 PRESENCE OF LEFT ARTIFICIAL KNEE JOINT: Chronic | ICD-10-CM

## 2022-01-24 PROCEDURE — 71250 CT THORAX DX C-: CPT | Mod: 26

## 2022-04-06 ENCOUNTER — OUTPATIENT (OUTPATIENT)
Dept: OUTPATIENT SERVICES | Facility: HOSPITAL | Age: 87
LOS: 1 days | Discharge: HOME | End: 2022-04-06
Payer: MEDICARE

## 2022-04-06 VITALS
DIASTOLIC BLOOD PRESSURE: 79 MMHG | WEIGHT: 136.91 LBS | RESPIRATION RATE: 16 BRPM | HEART RATE: 65 BPM | HEIGHT: 66 IN | OXYGEN SATURATION: 99 % | SYSTOLIC BLOOD PRESSURE: 181 MMHG | TEMPERATURE: 97 F

## 2022-04-06 DIAGNOSIS — M66.241 SPONTANEOUS RUPTURE OF EXTENSOR TENDONS, RIGHT HAND: ICD-10-CM

## 2022-04-06 DIAGNOSIS — Z90.5 ACQUIRED ABSENCE OF KIDNEY: Chronic | ICD-10-CM

## 2022-04-06 DIAGNOSIS — Z98.890 OTHER SPECIFIED POSTPROCEDURAL STATES: Chronic | ICD-10-CM

## 2022-04-06 DIAGNOSIS — Z96.652 PRESENCE OF LEFT ARTIFICIAL KNEE JOINT: Chronic | ICD-10-CM

## 2022-04-06 DIAGNOSIS — Z01.818 ENCOUNTER FOR OTHER PREPROCEDURAL EXAMINATION: ICD-10-CM

## 2022-04-06 DIAGNOSIS — C67.9 MALIGNANT NEOPLASM OF BLADDER, UNSPECIFIED: Chronic | ICD-10-CM

## 2022-04-06 DIAGNOSIS — Z96.641 PRESENCE OF RIGHT ARTIFICIAL HIP JOINT: Chronic | ICD-10-CM

## 2022-04-06 LAB
ALBUMIN SERPL ELPH-MCNC: 4.7 G/DL — SIGNIFICANT CHANGE UP (ref 3.5–5.2)
ALP SERPL-CCNC: 105 U/L — SIGNIFICANT CHANGE UP (ref 30–115)
ALT FLD-CCNC: 22 U/L — SIGNIFICANT CHANGE UP (ref 0–41)
ANION GAP SERPL CALC-SCNC: 15 MMOL/L — HIGH (ref 7–14)
APTT BLD: 38 SEC — SIGNIFICANT CHANGE UP (ref 27–39.2)
AST SERPL-CCNC: 26 U/L — SIGNIFICANT CHANGE UP (ref 0–41)
BASOPHILS # BLD AUTO: 0.05 K/UL — SIGNIFICANT CHANGE UP (ref 0–0.2)
BASOPHILS NFR BLD AUTO: 0.8 % — SIGNIFICANT CHANGE UP (ref 0–1)
BILIRUB SERPL-MCNC: 0.3 MG/DL — SIGNIFICANT CHANGE UP (ref 0.2–1.2)
BUN SERPL-MCNC: 56 MG/DL — HIGH (ref 10–20)
CALCIUM SERPL-MCNC: 10.2 MG/DL — HIGH (ref 8.5–10.1)
CHLORIDE SERPL-SCNC: 105 MMOL/L — SIGNIFICANT CHANGE UP (ref 98–110)
CO2 SERPL-SCNC: 19 MMOL/L — SIGNIFICANT CHANGE UP (ref 17–32)
CREAT SERPL-MCNC: 2.2 MG/DL — HIGH (ref 0.7–1.5)
EGFR: 20 ML/MIN/1.73M2 — LOW
EOSINOPHIL # BLD AUTO: 0.17 K/UL — SIGNIFICANT CHANGE UP (ref 0–0.7)
EOSINOPHIL NFR BLD AUTO: 2.7 % — SIGNIFICANT CHANGE UP (ref 0–8)
GLUCOSE SERPL-MCNC: 82 MG/DL — SIGNIFICANT CHANGE UP (ref 70–99)
HCT VFR BLD CALC: 26.4 % — LOW (ref 37–47)
HGB BLD-MCNC: 8.5 G/DL — LOW (ref 12–16)
IMM GRANULOCYTES NFR BLD AUTO: 0.6 % — HIGH (ref 0.1–0.3)
INR BLD: 0.95 RATIO — SIGNIFICANT CHANGE UP (ref 0.65–1.3)
LYMPHOCYTES # BLD AUTO: 1.68 K/UL — SIGNIFICANT CHANGE UP (ref 1.2–3.4)
LYMPHOCYTES # BLD AUTO: 26.2 % — SIGNIFICANT CHANGE UP (ref 20.5–51.1)
MCHC RBC-ENTMCNC: 30.2 PG — SIGNIFICANT CHANGE UP (ref 27–31)
MCHC RBC-ENTMCNC: 32.2 G/DL — SIGNIFICANT CHANGE UP (ref 32–37)
MCV RBC AUTO: 94 FL — SIGNIFICANT CHANGE UP (ref 81–99)
MONOCYTES # BLD AUTO: 0.7 K/UL — HIGH (ref 0.1–0.6)
MONOCYTES NFR BLD AUTO: 10.9 % — HIGH (ref 1.7–9.3)
NEUTROPHILS # BLD AUTO: 3.77 K/UL — SIGNIFICANT CHANGE UP (ref 1.4–6.5)
NEUTROPHILS NFR BLD AUTO: 58.8 % — SIGNIFICANT CHANGE UP (ref 42.2–75.2)
NRBC # BLD: 0 /100 WBCS — SIGNIFICANT CHANGE UP (ref 0–0)
PLATELET # BLD AUTO: 180 K/UL — SIGNIFICANT CHANGE UP (ref 130–400)
POTASSIUM SERPL-MCNC: 5.3 MMOL/L — HIGH (ref 3.5–5)
POTASSIUM SERPL-SCNC: 5.3 MMOL/L — HIGH (ref 3.5–5)
PROT SERPL-MCNC: 7.3 G/DL — SIGNIFICANT CHANGE UP (ref 6–8)
PROTHROM AB SERPL-ACNC: 10.9 SEC — SIGNIFICANT CHANGE UP (ref 9.95–12.87)
RBC # BLD: 2.81 M/UL — LOW (ref 4.2–5.4)
RBC # FLD: 18.8 % — HIGH (ref 11.5–14.5)
SODIUM SERPL-SCNC: 139 MMOL/L — SIGNIFICANT CHANGE UP (ref 135–146)
WBC # BLD: 6.41 K/UL — SIGNIFICANT CHANGE UP (ref 4.8–10.8)
WBC # FLD AUTO: 6.41 K/UL — SIGNIFICANT CHANGE UP (ref 4.8–10.8)

## 2022-04-06 PROCEDURE — 93010 ELECTROCARDIOGRAM REPORT: CPT

## 2022-04-06 NOTE — H&P PST ADULT - REASON FOR ADMISSION
Suite: CASProceduralist: Byron Thakur  Confirmed Surgery DateTime: 04-   Procedure: RIGHT HAND THIRD DIGIT, FOURTH DIGIT EXTENSOR TENDON STABILIZATION  Laterality: RightLength of Procedure: 60 MinutesAnesthesia Type: Local Standby

## 2022-04-06 NOTE — H&P PST ADULT - NSICDXPASTMEDICALHX_GEN_ALL_CORE_FT
PAST MEDICAL HISTORY:  Cancer Bladder -NOW    Colitis with diarrhea    COPD with emphysema     HTN (hypertension)     Hypothyroidism     Other type of osteoarthritis

## 2022-04-06 NOTE — H&P PST ADULT - HISTORY OF PRESENT ILLNESS
89 yo female presents for PAST in preparation for RIGHT HAND THIRD DIGIT, FOURTH DIGIT EXTENSOR TENDON STABILIZATION  Pt complains of right hand trigger finder, affecting 3rd and 4th digit and now c/o of th thumb pain. Pain is described as " uncomfortable"  6/10, minimal funtion to the hand. Denies any chest pain, difficulty breathing, SOB, palpitations, dysuria, URI, or any other infections in the last 2 weeks/1 month. Denies any recent travel, contact, or exposure to any persons with known or suspected COVID-19. Pt also denies COVID testing within the last 2 weeks. Pt advised to self quarantine until day of procedure. Exercise tolerance of < 30 feet without dyspnea. JEIMY reviewed with patient.    Anesthesia Alert  NO--Difficult Airway  NO--History of neck surgery or radiation  NO--Limited ROM of neck  NO--History of Malignant hyperthermia  NO--Personal or family history of Pseudocholinesterase deficiency.  NO--Prior Anesthesia Complication  NO--Latex Allergy  NO--Loose teeth  NO--History of Rheumatoid Arthritis  NO--JEIMY  NO--Bleeding risk  NO--Other   written and verbal instructions with teach back on chlorhexidine shampoo provided,  pt verbalized understanding with returned demonstration  Patient verbalized understanding of instructions and was given the opportunity to ask questions and have them answered.

## 2022-07-06 ENCOUNTER — LABORATORY RESULT (OUTPATIENT)
Age: 87
End: 2022-07-06

## 2022-07-06 ENCOUNTER — APPOINTMENT (OUTPATIENT)
Dept: HEMATOLOGY ONCOLOGY | Facility: CLINIC | Age: 87
End: 2022-07-06

## 2022-07-06 VITALS
SYSTOLIC BLOOD PRESSURE: 143 MMHG | TEMPERATURE: 97.8 F | WEIGHT: 140 LBS | HEIGHT: 64 IN | HEART RATE: 62 BPM | BODY MASS INDEX: 23.9 KG/M2 | DIASTOLIC BLOOD PRESSURE: 64 MMHG

## 2022-07-06 LAB
HCT VFR BLD CALC: 24.4 %
HGB BLD-MCNC: 8.2 G/DL
MCHC RBC-ENTMCNC: 31.4 PG
MCHC RBC-ENTMCNC: 33.6 G/DL
MCV RBC AUTO: 93.5 FL
PLATELET # BLD AUTO: 147 K/UL
PMV BLD: 10 FL
RBC # BLD: 2.61 M/UL
RBC # FLD: 16.7 %
RETICS # AUTO: 3.5 %
RETICS AGGREG/RBC NFR: 91.6 K/UL
WBC # FLD AUTO: 5.37 K/UL

## 2022-07-06 PROCEDURE — 99204 OFFICE O/P NEW MOD 45 MIN: CPT

## 2022-07-07 LAB
FERRITIN SERPL-MCNC: 468 NG/ML
HAPTOGLOB SERPL-MCNC: <20 MG/DL
VIT B12 SERPL-MCNC: 456 PG/ML

## 2022-07-08 NOTE — HISTORY OF PRESENT ILLNESS
[de-identified] : 89 year old female with history of COPD , Aortic stenosis , AAA , left nephrectomy ( renal pelvis cancer ) , right midpole renal mass ,  bladder neck cancer , declined treatment . History of malignant hyperthermia , Right lung mass ( decreased on last CT scan ) referred for chronic normocytic anemia present at least since 2019 . She was transfused 2 years ago and recently for symptomatic anemia with Hgb : 7.6 . Review of records show baseline Hgb around  8 . she denies hematochezia , she is on iron 2X /day fro 2 weeks and is well tolerated . last ferritin > 800 in 12/2021 . Cr : 2.2 GFR : 20 \par System review : she complains of generalized weakness , uses a cane to ambulate , mild  dyspnea on exertion , lives in assisted living and is capable of all self care ( bathing , cookin ) she denies fever ,bone pain , significant weight loss. She complains of mild suprapubic pain with urination , no hematuria .

## 2022-07-08 NOTE — ASSESSMENT
[FreeTextEntry1] : 89 year old female with multiple medical problems , chronic normocytic anemia , baseline Hgb around 8 , required transfusion recently for symptomatic anemia . at least partly due to CKD , chronic disease. NO definite evidence of bleeding . \par History of renal cancer, inoperable bladder cancer ? Right lung mass . \par Aortic stenosis , AAA .\par Plan : anemia work up . \par          consider EPO , transfusions as needed.

## 2022-07-09 LAB — EPO SERPL-MCNC: 19.4 MIU/ML

## 2022-07-11 LAB
ALBUMIN MFR SERPL ELPH: 58.8 %
ALBUMIN SERPL-MCNC: 3.9 G/DL
ALBUMIN/GLOB SERPL: 1.4 RATIO
ALPHA1 GLOB MFR SERPL ELPH: 6.1 %
ALPHA1 GLOB SERPL ELPH-MCNC: 0.4 G/DL
ALPHA2 GLOB MFR SERPL ELPH: 7.7 %
ALPHA2 GLOB SERPL ELPH-MCNC: 0.5 G/DL
B-GLOBULIN MFR SERPL ELPH: 12.6 %
B-GLOBULIN SERPL ELPH-MCNC: 0.8 G/DL
GAMMA GLOB FLD ELPH-MCNC: 1 G/DL
GAMMA GLOB MFR SERPL ELPH: 14.8 %
INTERPRETATION SERPL IEP-IMP: NORMAL
M PROTEIN MFR SERPL ELPH: NORMAL
MONOCLON BAND OBS SERPL: NORMAL
PROT SERPL-MCNC: 6.7 G/DL
PROT SERPL-MCNC: 6.7 G/DL

## 2022-07-14 ENCOUNTER — LABORATORY RESULT (OUTPATIENT)
Age: 87
End: 2022-07-14

## 2022-07-14 ENCOUNTER — APPOINTMENT (OUTPATIENT)
Dept: INFUSION THERAPY | Facility: CLINIC | Age: 87
End: 2022-07-14

## 2022-07-14 DIAGNOSIS — Z00.00 ENCOUNTER FOR GENERAL ADULT MEDICAL EXAMINATION W/OUT ABNORMAL FINDINGS: ICD-10-CM

## 2022-07-14 LAB
HCT VFR BLD CALC: 23.8 %
HGB BLD-MCNC: 8 G/DL
MCHC RBC-ENTMCNC: 31.4 PG
MCHC RBC-ENTMCNC: 33.6 G/DL
MCV RBC AUTO: 93.3 FL
PLATELET # BLD AUTO: 147 K/UL
PMV BLD: 10 FL
RBC # BLD: 2.55 M/UL
RBC # FLD: 16.6 %
WBC # FLD AUTO: 6.03 K/UL

## 2022-07-14 RX ORDER — ERYTHROPOIETIN 10000 [IU]/ML
20000 INJECTION, SOLUTION INTRAVENOUS; SUBCUTANEOUS ONCE
Refills: 0 | Status: COMPLETED | OUTPATIENT
Start: 2022-07-14 | End: 2022-07-14

## 2022-07-14 RX ADMIN — ERYTHROPOIETIN 20000 UNIT(S): 10000 INJECTION, SOLUTION INTRAVENOUS; SUBCUTANEOUS at 12:07

## 2022-07-27 ENCOUNTER — INPATIENT (INPATIENT)
Facility: HOSPITAL | Age: 87
LOS: 6 days | Discharge: NURSING, REHAB OR LONG-TERM CARE | End: 2022-08-03
Attending: INTERNAL MEDICINE | Admitting: INTERNAL MEDICINE

## 2022-07-27 VITALS
SYSTOLIC BLOOD PRESSURE: 98 MMHG | TEMPERATURE: 98 F | HEIGHT: 66 IN | DIASTOLIC BLOOD PRESSURE: 52 MMHG | OXYGEN SATURATION: 94 % | RESPIRATION RATE: 16 BRPM | HEART RATE: 80 BPM

## 2022-07-27 DIAGNOSIS — Z98.890 OTHER SPECIFIED POSTPROCEDURAL STATES: Chronic | ICD-10-CM

## 2022-07-27 DIAGNOSIS — Z90.5 ACQUIRED ABSENCE OF KIDNEY: Chronic | ICD-10-CM

## 2022-07-27 DIAGNOSIS — Z96.641 PRESENCE OF RIGHT ARTIFICIAL HIP JOINT: Chronic | ICD-10-CM

## 2022-07-27 DIAGNOSIS — Z96.652 PRESENCE OF LEFT ARTIFICIAL KNEE JOINT: Chronic | ICD-10-CM

## 2022-07-27 DIAGNOSIS — C67.9 MALIGNANT NEOPLASM OF BLADDER, UNSPECIFIED: Chronic | ICD-10-CM

## 2022-07-27 LAB
ALBUMIN SERPL ELPH-MCNC: 3.9 G/DL — SIGNIFICANT CHANGE UP (ref 3.5–5.2)
ALP SERPL-CCNC: 198 U/L — HIGH (ref 30–115)
ALT FLD-CCNC: >700 U/L — HIGH (ref 0–41)
ANION GAP SERPL CALC-SCNC: 12 MMOL/L — SIGNIFICANT CHANGE UP (ref 7–14)
ANISOCYTOSIS BLD QL: SIGNIFICANT CHANGE UP
APPEARANCE UR: CLEAR — SIGNIFICANT CHANGE UP
AST SERPL-CCNC: >700 U/L — HIGH (ref 0–41)
BACTERIA # UR AUTO: ABNORMAL
BASE EXCESS BLDV CALC-SCNC: -11.1 MMOL/L — LOW (ref -2–3)
BASOPHILS # BLD AUTO: 0 K/UL — SIGNIFICANT CHANGE UP (ref 0–0.2)
BASOPHILS NFR BLD AUTO: 0 % — SIGNIFICANT CHANGE UP (ref 0–1)
BILIRUB DIRECT SERPL-MCNC: 1.8 MG/DL — HIGH (ref 0–0.3)
BILIRUB INDIRECT FLD-MCNC: 0.6 MG/DL — SIGNIFICANT CHANGE UP (ref 0.2–1.2)
BILIRUB SERPL-MCNC: 2.4 MG/DL — HIGH (ref 0.2–1.2)
BILIRUB UR-MCNC: ABNORMAL
BLD GP AB SCN SERPL QL: SIGNIFICANT CHANGE UP
BUN SERPL-MCNC: 58 MG/DL — HIGH (ref 10–20)
CA-I SERPL-SCNC: 1.4 MMOL/L — HIGH (ref 1.15–1.33)
CALCIUM SERPL-MCNC: 9.2 MG/DL — SIGNIFICANT CHANGE UP (ref 8.5–10.1)
CHLORIDE SERPL-SCNC: 103 MMOL/L — SIGNIFICANT CHANGE UP (ref 98–110)
CO2 SERPL-SCNC: 17 MMOL/L — SIGNIFICANT CHANGE UP (ref 17–32)
COLOR SPEC: ABNORMAL
CREAT SERPL-MCNC: 3.5 MG/DL — HIGH (ref 0.7–1.5)
DIFF PNL FLD: ABNORMAL
EGFR: 12 ML/MIN/1.73M2 — LOW
EOSINOPHIL # BLD AUTO: 0 K/UL — SIGNIFICANT CHANGE UP (ref 0–0.7)
EOSINOPHIL NFR BLD AUTO: 0 % — SIGNIFICANT CHANGE UP (ref 0–8)
EPI CELLS # UR: 8 /HPF — HIGH (ref 0–5)
GAS PNL BLDV: 132 MMOL/L — LOW (ref 136–145)
GAS PNL BLDV: SIGNIFICANT CHANGE UP
GLUCOSE SERPL-MCNC: 128 MG/DL — HIGH (ref 70–99)
GLUCOSE UR QL: NEGATIVE — SIGNIFICANT CHANGE UP
HCO3 BLDV-SCNC: 16 MMOL/L — LOW (ref 22–29)
HCT VFR BLD CALC: 22.9 % — LOW (ref 37–47)
HCT VFR BLDA CALC: 20 % — CRITICAL LOW (ref 39–51)
HGB BLD CALC-MCNC: 6.6 G/DL — CRITICAL LOW (ref 12.6–17.4)
HGB BLD-MCNC: 7 G/DL — LOW (ref 12–16)
KETONES UR-MCNC: NEGATIVE — SIGNIFICANT CHANGE UP
LACTATE BLDV-MCNC: 1.1 MMOL/L — SIGNIFICANT CHANGE UP (ref 0.5–2)
LACTATE SERPL-SCNC: 1.4 MMOL/L — SIGNIFICANT CHANGE UP (ref 0.7–2)
LEUKOCYTE ESTERASE UR-ACNC: ABNORMAL
LIDOCAIN IGE QN: 163 U/L — HIGH (ref 7–60)
LYMPHOCYTES # BLD AUTO: 0.81 K/UL — LOW (ref 1.2–3.4)
LYMPHOCYTES # BLD AUTO: 12.2 % — LOW (ref 20.5–51.1)
MANUAL SMEAR VERIFICATION: SIGNIFICANT CHANGE UP
MCHC RBC-ENTMCNC: 30.6 G/DL — LOW (ref 32–37)
MCHC RBC-ENTMCNC: 30.7 PG — SIGNIFICANT CHANGE UP (ref 27–31)
MCV RBC AUTO: 100.4 FL — HIGH (ref 81–99)
MICROCYTES BLD QL: SIGNIFICANT CHANGE UP
MONOCYTES # BLD AUTO: 0.34 K/UL — SIGNIFICANT CHANGE UP (ref 0.1–0.6)
MONOCYTES NFR BLD AUTO: 5.2 % — SIGNIFICANT CHANGE UP (ref 1.7–9.3)
MYELOCYTES NFR BLD: 0.9 % — HIGH (ref 0–0)
NEUTROPHILS # BLD AUTO: 5.41 K/UL — SIGNIFICANT CHANGE UP (ref 1.4–6.5)
NEUTROPHILS NFR BLD AUTO: 81.7 % — HIGH (ref 42.2–75.2)
NITRITE UR-MCNC: POSITIVE
PCO2 BLDV: 41 MMHG — SIGNIFICANT CHANGE UP (ref 39–42)
PH BLDV: 7.2 — LOW (ref 7.32–7.43)
PH UR: 6 — SIGNIFICANT CHANGE UP (ref 5–8)
PLAT MORPH BLD: NORMAL — SIGNIFICANT CHANGE UP
PLATELET # BLD AUTO: 119 K/UL — LOW (ref 130–400)
PO2 BLDV: 31 MMHG — SIGNIFICANT CHANGE UP
POIKILOCYTOSIS BLD QL AUTO: SIGNIFICANT CHANGE UP
POLYCHROMASIA BLD QL SMEAR: SLIGHT — SIGNIFICANT CHANGE UP
POTASSIUM BLDV-SCNC: 5.7 MMOL/L — HIGH (ref 3.5–5.1)
POTASSIUM SERPL-MCNC: 5.2 MMOL/L — HIGH (ref 3.5–5)
POTASSIUM SERPL-SCNC: 5.2 MMOL/L — HIGH (ref 3.5–5)
PROT SERPL-MCNC: 6 G/DL — SIGNIFICANT CHANGE UP (ref 6–8)
PROT UR-MCNC: ABNORMAL
RBC # BLD: 2.28 M/UL — LOW (ref 4.2–5.4)
RBC # FLD: 17 % — HIGH (ref 11.5–14.5)
RBC BLD AUTO: ABNORMAL
RBC CASTS # UR COMP ASSIST: 32 /HPF — HIGH (ref 0–4)
SAO2 % BLDV: 29.2 % — SIGNIFICANT CHANGE UP
SARS-COV-2 RNA SPEC QL NAA+PROBE: SIGNIFICANT CHANGE UP
SCHISTOCYTES BLD QL AUTO: SIGNIFICANT CHANGE UP
SODIUM SERPL-SCNC: 132 MMOL/L — LOW (ref 135–146)
SP GR SPEC: 1.02 — SIGNIFICANT CHANGE UP (ref 1.01–1.03)
SPHEROCYTES BLD QL SMEAR: SLIGHT — SIGNIFICANT CHANGE UP
UROBILINOGEN FLD QL: SIGNIFICANT CHANGE UP
WBC # BLD: 6.62 K/UL — SIGNIFICANT CHANGE UP (ref 4.8–10.8)
WBC # FLD AUTO: 6.62 K/UL — SIGNIFICANT CHANGE UP (ref 4.8–10.8)
WBC UR QL: 107 /HPF — HIGH (ref 0–5)

## 2022-07-27 PROCEDURE — 76705 ECHO EXAM OF ABDOMEN: CPT | Mod: 26,59

## 2022-07-27 PROCEDURE — 71045 X-RAY EXAM CHEST 1 VIEW: CPT | Mod: 26

## 2022-07-27 PROCEDURE — 99285 EMERGENCY DEPT VISIT HI MDM: CPT | Mod: FS

## 2022-07-27 PROCEDURE — 74176 CT ABD & PELVIS W/O CONTRAST: CPT | Mod: 26,MA

## 2022-07-27 PROCEDURE — 93975 VASCULAR STUDY: CPT | Mod: 26

## 2022-07-27 PROCEDURE — 93010 ELECTROCARDIOGRAM REPORT: CPT

## 2022-07-27 RX ORDER — LEVOTHYROXINE SODIUM 125 MCG
75 TABLET ORAL DAILY
Refills: 0 | Status: DISCONTINUED | OUTPATIENT
Start: 2022-07-28 | End: 2022-08-03

## 2022-07-27 RX ORDER — ONDANSETRON 8 MG/1
4 TABLET, FILM COATED ORAL ONCE
Refills: 0 | Status: COMPLETED | OUTPATIENT
Start: 2022-07-27 | End: 2022-07-27

## 2022-07-27 RX ORDER — TIOTROPIUM BROMIDE 18 UG/1
1 CAPSULE ORAL; RESPIRATORY (INHALATION) DAILY
Refills: 0 | Status: DISCONTINUED | OUTPATIENT
Start: 2022-07-27 | End: 2022-08-03

## 2022-07-27 RX ORDER — UMECLIDINIUM 62.5 UG/1
1 AEROSOL, POWDER ORAL
Qty: 0 | Refills: 0 | DISCHARGE

## 2022-07-27 RX ORDER — DULOXETINE HYDROCHLORIDE 30 MG/1
1 CAPSULE, DELAYED RELEASE ORAL
Qty: 0 | Refills: 0 | DISCHARGE

## 2022-07-27 RX ORDER — METOPROLOL TARTRATE 50 MG
25 TABLET ORAL
Refills: 0 | Status: DISCONTINUED | OUTPATIENT
Start: 2022-07-27 | End: 2022-08-03

## 2022-07-27 RX ORDER — SODIUM CHLORIDE 9 MG/ML
500 INJECTION INTRAMUSCULAR; INTRAVENOUS; SUBCUTANEOUS ONCE
Refills: 0 | Status: COMPLETED | OUTPATIENT
Start: 2022-07-27 | End: 2022-07-27

## 2022-07-27 RX ORDER — FAMOTIDINE 10 MG/ML
20 INJECTION INTRAVENOUS ONCE
Refills: 0 | Status: COMPLETED | OUTPATIENT
Start: 2022-07-27 | End: 2022-07-27

## 2022-07-27 RX ORDER — SODIUM CHLORIDE 9 MG/ML
1000 INJECTION, SOLUTION INTRAVENOUS
Refills: 0 | Status: DISCONTINUED | OUTPATIENT
Start: 2022-07-27 | End: 2022-07-28

## 2022-07-27 RX ORDER — SIMVASTATIN 20 MG/1
20 TABLET, FILM COATED ORAL AT BEDTIME
Refills: 0 | Status: DISCONTINUED | OUTPATIENT
Start: 2022-07-27 | End: 2022-07-28

## 2022-07-27 RX ORDER — CEFTRIAXONE 500 MG/1
1000 INJECTION, POWDER, FOR SOLUTION INTRAMUSCULAR; INTRAVENOUS ONCE
Refills: 0 | Status: COMPLETED | OUTPATIENT
Start: 2022-07-27 | End: 2022-07-27

## 2022-07-27 RX ORDER — LEVOTHYROXINE SODIUM 125 MCG
1 TABLET ORAL
Qty: 0 | Refills: 0 | DISCHARGE

## 2022-07-27 RX ORDER — HEPARIN SODIUM 5000 [USP'U]/ML
5000 INJECTION INTRAVENOUS; SUBCUTANEOUS EVERY 12 HOURS
Refills: 0 | Status: DISCONTINUED | OUTPATIENT
Start: 2022-07-27 | End: 2022-08-03

## 2022-07-27 RX ORDER — METOPROLOL TARTRATE 50 MG
1 TABLET ORAL
Qty: 0 | Refills: 0 | DISCHARGE

## 2022-07-27 RX ORDER — CEFTRIAXONE 500 MG/1
1000 INJECTION, POWDER, FOR SOLUTION INTRAMUSCULAR; INTRAVENOUS EVERY 24 HOURS
Refills: 0 | Status: COMPLETED | OUTPATIENT
Start: 2022-07-28 | End: 2022-07-30

## 2022-07-27 RX ORDER — AMLODIPINE BESYLATE 2.5 MG/1
5 TABLET ORAL DAILY
Refills: 0 | Status: DISCONTINUED | OUTPATIENT
Start: 2022-07-27 | End: 2022-07-31

## 2022-07-27 RX ORDER — SIMVASTATIN 20 MG/1
1 TABLET, FILM COATED ORAL
Qty: 0 | Refills: 0 | DISCHARGE

## 2022-07-27 RX ORDER — DULOXETINE HYDROCHLORIDE 30 MG/1
60 CAPSULE, DELAYED RELEASE ORAL DAILY
Refills: 0 | Status: DISCONTINUED | OUTPATIENT
Start: 2022-07-27 | End: 2022-08-03

## 2022-07-27 RX ADMIN — ONDANSETRON 4 MILLIGRAM(S): 8 TABLET, FILM COATED ORAL at 07:58

## 2022-07-27 RX ADMIN — CEFTRIAXONE 100 MILLIGRAM(S): 500 INJECTION, POWDER, FOR SOLUTION INTRAMUSCULAR; INTRAVENOUS at 13:56

## 2022-07-27 RX ADMIN — AMLODIPINE BESYLATE 5 MILLIGRAM(S): 2.5 TABLET ORAL at 20:56

## 2022-07-27 RX ADMIN — SODIUM CHLORIDE 500 MILLILITER(S): 9 INJECTION INTRAMUSCULAR; INTRAVENOUS; SUBCUTANEOUS at 11:12

## 2022-07-27 RX ADMIN — FAMOTIDINE 20 MILLIGRAM(S): 10 INJECTION INTRAVENOUS at 07:58

## 2022-07-27 RX ADMIN — SODIUM CHLORIDE 500 MILLILITER(S): 9 INJECTION INTRAMUSCULAR; INTRAVENOUS; SUBCUTANEOUS at 08:28

## 2022-07-27 RX ADMIN — SIMVASTATIN 20 MILLIGRAM(S): 20 TABLET, FILM COATED ORAL at 22:16

## 2022-07-27 NOTE — PATIENT PROFILE ADULT - FUNCTIONAL ASSESSMENT - BASIC MOBILITY 6.
Presents with c/o chest pressure  2-calculated by average/Not able to assess (calculate score using [facility] averaging method)  2-calculated by average/Not able to assess (calculate score using [facility] averaging method)

## 2022-07-27 NOTE — H&P ADULT - NSICDXPASTMEDICALHX_GEN_ALL_CORE_FT
PAST MEDICAL HISTORY:  Bladder cancer not being actively treated    COPD with emphysema     Dyslipidemia     HTN (hypertension)     Hypothyroidism     Osteoarthritis

## 2022-07-27 NOTE — PATIENT PROFILE ADULT - FALL HARM RISK - HARM RISK INTERVENTIONS

## 2022-07-27 NOTE — H&P ADULT - NSHPLABSRESULTS_GEN_ALL_CORE
CT Abdomen and Pelvis No Cont (07.27.22 @ 09:20)  1. Small amount of fluid seen adjacent to lower pole the right kidney which appears hyperdense. Questionable hemorrhage. Follow-up is therefore recommended.  2. Infrarenal aortic aneurysm slightly increased in size now measuring 4.3 cm.  3. Increasing intrahepatic and extra back bili ductal dilatation. If there are elevated liver function tests, correlate with MRCP on an outpatient basis.

## 2022-07-27 NOTE — H&P ADULT - NSHPPHYSICALEXAM_GEN_ALL_CORE
VITALS  T(C): 36.8 (07-27-22 @ 19:54), Max: 37 (07-27-22 @ 07:41)  HR: 62 (07-27-22 @ 19:54) (59 - 80)  BP: 143/76 (07-27-22 @ 19:54) (98/52 - 168/73)  RR: 18 (07-27-22 @ 19:54) (16 - 18)  SpO2: 99% (07-27-22 @ 18:00) (93% - 99%)    PHYSICAL EXAM  GENERAL: NAD, well-developed  NEURO: AO x3, PERRLA, EOMI, motor strength intact in 4/4 extremities, sensation intact  HEAD:  Atraumatic, Normocephalic  EYES: conjunctiva and sclera clear  NECK: Supple, No JVD, no lymphadenopathy, no thyromegaly  CHEST/LUNG: Clear to auscultation bilaterally; No wheezes, rales or rhonchi  HEART: Regular rate and rhythm; No murmurs, rubs, or gallops  ABDOMEN: Mild, diffuse abdominal tenderness (most notable in suprapubic area); Soft, Nondistended; Bowel sounds present, no masses.  EXTREMITIES:  2+ Peripheral Pulses, No clubbing, cyanosis, or edema  SKIN: Warm, dry, intact, no rashes or lesions  PSYCH: affect appropriate

## 2022-07-27 NOTE — ED PROVIDER NOTE - CARE PLAN
Principal Discharge DX:	MANA (acute kidney injury)  Secondary Diagnosis:	Anemia  Secondary Diagnosis:	Acute UTI  Secondary Diagnosis:	Abdominal pain  Secondary Diagnosis:	Elevated LFTs   1

## 2022-07-27 NOTE — ED PROVIDER NOTE - NS ED ATTENDING STATEMENT MOD
This was a shared visit with the NITIN. I reviewed and verified the documentation and independently performed the documented:

## 2022-07-27 NOTE — H&P ADULT - ASSESSMENT
89F w/ h/o HTN, DLD, COPD, hypothyroidism, iron deficiency anemia, bladder cancer (not on active treatment), RCC (s/p left nephrectomy), and lung cancer (s/p left lobectomy) p/w 4 days of progressively worsening abdominal pain a/w severe diarrhea. Admission labs concerning for acute transaminitis, MANA, and acute on chronic anemia.     #Urinary Tract Infection  Initially p/w suprapubic abdominal pain. Recently rx'd Bactrim x5 days. UA concerning for UTI (positive nitrite, large LE, , and moderate bacteria). No evidence of sepsis present on admission.   - c/w ceftriaxone 1g IV QD  - f/u UCx final report    #Acute Kidney Injury  Admission labs significant for Cr 3.5 (previously Cr 2.2 in Apr 2022), concerning for MANA. Suspect pre-renal azotemia vs ATN secondary to renal ischemia (BP 98/52 on admission) in setting of hypovolemia due to excessive diarrhea.  - trend Cr via daily BMP/CMP  - f/u urine lytes to calculate FeNa/FeUrea (may be inaccurate in setting of NS bolus given in ED)  - start gentle hydration (after obtaining lytes) w/ LR @ 75 cc/hr    #Diarrhea, resolved  Initially p/w severe diarrhea that has since resolved. Suspect antibiotic-associated diarrhea in setting of recent Bactrim use.   - f/u stool culture  - consider C diff if diarrhea returns and >3 episodes in 24 hrs    #Acute Transaminitis  #Drug-Induced Liver Injury, suspected  Admission labs significant for acute transaminitis (AST/ALT 1694/802). Liver enzymes WNL in April 2022. R factor 11.4 suggests hepatocellular injury. CTAP demonstrates intrahepatic and extrahepatic ductal dilatation, but no masses noted. Overall concerning for drug-induced liver injury in setting of recent Bactrim use. Low suspicion for DRESS given lack of eosinophilia. Low suspicion for acute cholecystitis given h/o CCY.   - trend AST/ALT via daily CMP  - obtain PT/PTT (evaluate liver synthetic function)  - obtain US RUQ w/ Doppler (r/o portal vein thrombosis given h/o multiple cancers)  - obtain acute hepatitis panel (r/o acute viral hepatitis)  - obtain autoimmune markers (CARLITO, SMA, anti-LKM1; r/o autoimmune hepatitis)  - obtain tylenol and alcohol level    #Hypothyroidism  - obtain TSH  - c/w levothyroxine 75mcg PO QD    #Hypertension  #Dyslipidemia  - c/w amlodipine 5mg PO QD  - c/w Lopressor 25mg PO BID  - c/w simvastatin 20mg PO QHS    #COPD  No evidence of acute exacerbation on exam.  - c/w Spiriva 1 puff QD (therapeutic interchange for Incruse Ellipta)    #Depression  - c/w duloxetine DR 60mg PO QD    DVT PPX: heparin 5000U SQ Q12H  GI PPX: none indicated  DIET: DASH/TLC  ACTIVITY: IAT  CODE STATUS: Full Code (f/u GOC with son)  DISPOSITION: From Home 89F w/ h/o HTN, DLD, COPD, hypothyroidism, iron deficiency anemia, bladder cancer (not on active treatment), RCC (s/p left nephrectomy), and lung cancer (s/p left lobectomy) p/w 4 days of progressively worsening abdominal pain a/w severe diarrhea. Admission labs concerning for acute transaminitis, MANA, and acute on chronic anemia.     #Urinary Tract Infection  Initially p/w suprapubic abdominal pain. Recently rx'd Bactrim x5 days. UA concerning for UTI (positive nitrite, large LE, , and moderate bacteria). No evidence of sepsis present on admission.   - c/w ceftriaxone 1g IV QD  - f/u UCx final report    #Acute Kidney Injury  Admission labs significant for Cr 3.5 (previously Cr 2.2 in Apr 2022), concerning for MANA. Suspect pre-renal azotemia vs ATN secondary to renal ischemia (BP 98/52 on admission) in setting of hypovolemia due to excessive diarrhea.  - trend Cr via daily BMP/CMP  - f/u urine lytes to calculate FeNa/FeUrea (may be inaccurate in setting of NS bolus given in ED)  - start gentle hydration (after obtaining lytes) w/ LR @ 75 cc/hr    #Acute on Chronic Anemia  Admission labs significant Hb 7.0 (baseline Hb 7-9 per HIE). "Black stools" reported on admission, but JOSE R negative. S/P 1u pRBC total this admission.  - Trend Hb via daily CBC; transfuse if Hb<7  - Maintain active T&S   - limited utility in iron studies/ferritin given recent transfusion    #Diarrhea, resolved  Initially p/w severe diarrhea that has since resolved. Suspect antibiotic-associated diarrhea in setting of recent Bactrim use.   - f/u stool culture  - consider C diff if diarrhea returns and >3 episodes in 24 hrs    #Acute Transaminitis  #Drug-Induced Liver Injury, suspected  Admission labs significant for acute transaminitis (AST/ALT 1694/802). Liver enzymes WNL in April 2022. R factor 11.4 suggests hepatocellular injury. CTAP demonstrates intrahepatic and extrahepatic ductal dilatation, but no masses noted. Overall concerning for drug-induced liver injury in setting of recent Bactrim use. Low suspicion for DRESS given lack of eosinophilia. Low suspicion for acute cholecystitis given h/o CCY.   - trend AST/ALT via daily CMP  - obtain PT/PTT (evaluate liver synthetic function)  - obtain US RUQ w/ Doppler (r/o portal vein thrombosis given h/o multiple cancers)  - obtain acute hepatitis panel (r/o acute viral hepatitis)  - obtain autoimmune markers (CARLITO, SMA, anti-LKM1; r/o autoimmune hepatitis)  - obtain tylenol and alcohol level    #Hypothyroidism  - obtain TSH  - c/w levothyroxine 75mcg PO QD    #Hypertension  #Dyslipidemia  - c/w amlodipine 5mg PO QD  - c/w Lopressor 25mg PO BID  - c/w simvastatin 20mg PO QHS    #COPD  No evidence of acute exacerbation on exam.  - c/w Spiriva 1 puff QD (therapeutic interchange for Incruse Ellipta)    #Depression  - c/w duloxetine DR 60mg PO QD    DVT PPX: heparin 5000U SQ Q12H  GI PPX: none indicated  DIET: DASH/TLC  ACTIVITY: IAT  CODE STATUS: Full Code (f/u GOC with son)  DISPOSITION: From Home 89F w/ h/o HTN, DLD, COPD, hypothyroidism, iron deficiency anemia, bladder cancer (not on active treatment), RCC (s/p left nephrectomy), and lung cancer (s/p left lobectomy) p/w 4 days of progressively worsening abdominal pain a/w severe diarrhea. Admission labs concerning for acute transaminitis, MANA, and acute on chronic anemia.     #Urinary Tract Infection  Initially p/w suprapubic abdominal pain. Recently rx'd Bactrim x5 days. UA concerning for UTI (positive nitrite, large LE, , and moderate bacteria). No evidence of sepsis present on admission.   - c/w ceftriaxone 1g IV QD  - f/u UCx final report    #Acute Kidney Injury  Admission labs significant for Cr 3.5 (previously Cr 2.2 in Apr 2022), concerning for MANA. Suspect pre-renal azotemia vs ATN secondary to renal ischemia (BP 98/52 on admission) in setting of hypovolemia due to excessive diarrhea.  - trend Cr via daily BMP/CMP  - f/u urine lytes to calculate FeNa/FeUrea (may be inaccurate in setting of NS bolus given in ED)  - start gentle hydration (after obtaining lytes) w/ LR @ 75 cc/hr    #Acute on Chronic Anemia  Admission labs significant Hb 7.0 (baseline Hb 7-9 per HIE). "Black stools" reported on admission, but JOSE R negative. S/P 1u pRBC total this admission.  - Trend Hb via daily CBC; transfuse if Hb<7  - Maintain active T&S   - obtain reticulocyte count (evaluate bone marrow response)  - obtain haptoglobin and LDH (r/o hemolysis)  - limited utility in iron studies/ferritin given recent transfusion    #Diarrhea, resolved  Initially p/w severe diarrhea that has since resolved. Suspect antibiotic-associated diarrhea in setting of recent Bactrim use.   - f/u stool culture  - consider C diff if diarrhea returns and >3 episodes in 24 hrs    #Acute Transaminitis  #Drug-Induced Liver Injury, suspected  Admission labs significant for acute transaminitis (AST/ALT 1694/802). Liver enzymes WNL in April 2022. R factor 11.4 suggests hepatocellular injury. CTAP demonstrates intrahepatic and extrahepatic ductal dilatation, but no masses noted. Overall concerning for drug-induced liver injury in setting of recent Bactrim use. Low suspicion for DRESS given lack of eosinophilia. Low suspicion for acute cholecystitis given h/o CCY.   - trend AST/ALT via daily CMP  - obtain PT/PTT (evaluate liver synthetic function)  - obtain US RUQ w/ Doppler (r/o portal vein thrombosis given h/o multiple cancers)  - obtain acute hepatitis panel (r/o acute viral hepatitis)  - obtain autoimmune markers (CARLITO, SMA, anti-LKM1; r/o autoimmune hepatitis)  - obtain tylenol and alcohol level    #Hypothyroidism  - obtain TSH  - c/w levothyroxine 75mcg PO QD    #Hypertension  #Dyslipidemia  - c/w amlodipine 5mg PO QD  - c/w Lopressor 25mg PO BID  - c/w simvastatin 20mg PO QHS    #COPD  No evidence of acute exacerbation on exam.  - c/w Spiriva 1 puff QD (therapeutic interchange for Incruse Ellipta)    #Depression  - c/w duloxetine DR 60mg PO QD    DVT PPX: heparin 5000U SQ Q12H  GI PPX: none indicated  DIET: DASH/TLC  ACTIVITY: IAT  CODE STATUS: Full Code (f/u GOC with son)  DISPOSITION: From Home 89F w/ h/o HTN, DLD, COPD, hypothyroidism, iron deficiency anemia, bladder cancer (not on active treatment), RCC (s/p left nephrectomy), and lung cancer (s/p left lobectomy) p/w 4 days of progressively worsening abdominal pain a/w severe diarrhea. Admission labs concerning for acute transaminitis, MANA, and acute on chronic anemia.     #Urinary Tract Infection  Initially p/w suprapubic abdominal pain. Recently rx'd Bactrim x5 days. However, likely mis-dosed as pt's baseline CrCl ~15, yet prescribed Bactrim DS BID (per UpToDate, renal dosing at this CrCl should be SS BID or even QD). UA concerning for UTI (positive nitrite, large LE, , and moderate bacteria). No evidence of sepsis present on admission.   - c/w ceftriaxone 1g IV QD  - f/u UCx final report    #Acute Kidney Injury  Admission labs significant for Cr 3.5 (previously Cr 2.2 in Apr 2022), concerning for MANA. Suspect pre-renal azotemia vs ATN secondary to renal ischemia (BP 98/52 on admission) in setting of hypovolemia due to excessive diarrhea. However, may also be related to recent Bactrim use as TMP is known to decrease tubular secretion of creatinine. Should also suspect RTA, type IV which can be a/w Bactrim use, but lower suspicion as K only 5.2 and slight hemolyzed (should suspect if K>5.5 on repeat in AM).  - trend Cr via daily BMP/CMP  - f/u urine lytes to calculate FeNa/FeUrea (may be inaccurate in setting of NS bolus given in ED)  - start gentle hydration (after obtaining lytes) w/ LR @ 75 cc/hr    #Acute on Chronic Anemia  Admission labs significant Hb 7.0 (baseline Hb 7-9 per HIE). "Black stools" reported on admission, but JOSE R negative. S/P 1u pRBC total this admission.  - Trend Hb via daily CBC; transfuse if Hb<7  - Maintain active T&S   - obtain reticulocyte count (evaluate bone marrow response)  - obtain haptoglobin and LDH (r/o hemolysis as Bactrim can be a/w hemolytic anemia)  - limited utility in iron studies/ferritin given recent transfusion    #Diarrhea, resolved  Initially p/w severe diarrhea that has since resolved. Suspect antibiotic-associated diarrhea in setting of recent Bactrim use.   - f/u stool culture  - consider C diff if diarrhea returns and >3 episodes in 24 hrs    #Acute Transaminitis  #Drug-Induced Liver Injury, suspected  Admission labs significant for acute transaminitis (AST/ALT 1694/802). Liver enzymes WNL in April 2022. R factor 11.4 suggests hepatocellular injury. CTAP demonstrates intrahepatic and extrahepatic ductal dilatation, but no masses noted. Overall concerning for drug-induced liver injury in setting of recent Bactrim use. Low suspicion for DRESS given lack of eosinophilia. Low suspicion for acute cholecystitis given h/o CCY.   - trend AST/ALT via daily CMP  - obtain PT/PTT (evaluate liver synthetic function)  - obtain US RUQ w/ Doppler (r/o portal vein thrombosis given h/o multiple cancers)  - obtain acute hepatitis panel (r/o acute viral hepatitis)  - obtain autoimmune markers (CARLITO, SMA, anti-LKM1; r/o autoimmune hepatitis)  - obtain tylenol and alcohol level    #Hypothyroidism  - obtain TSH  - c/w levothyroxine 75mcg PO QD    #Hypertension  #Dyslipidemia  - c/w amlodipine 5mg PO QD  - c/w Lopressor 25mg PO BID  - c/w simvastatin 20mg PO QHS    #COPD  No evidence of acute exacerbation on exam.  - c/w Spiriva 1 puff QD (therapeutic interchange for Incruse Ellipta)    #Depression  - c/w duloxetine DR 60mg PO QD    DVT PPX: heparin 5000U SQ Q12H  GI PPX: none indicated  DIET: DASH/TLC  ACTIVITY: IAT  CODE STATUS: Full Code (f/u GOC with son)  DISPOSITION: From Home 89F w/ h/o HTN, DLD, COPD, hypothyroidism, iron deficiency anemia, bladder cancer (not on active treatment), RCC (s/p left nephrectomy - in remission), and lung cancer (s/p left lobectomy - in remission) p/w 4 days of progressively worsening abdominal pain a/w severe diarrhea. Admission labs concerning for acute transaminitis, MANA, and acute on chronic anemia.     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    # Urinary Tract Infection; no sepsis  Recently rx'd Bactrim x5 days  UA + (positive nitrite, large LE, , and moderate bacteria)  abx: c/w ceftriaxone 1g IV QD  send BCx x1  f/u UCx    # Acute Kidney Injury; CKD 4 w/ solitary Kidney (base Cr upper 1s to 2); s/p Lt nephrectomy  Uosm 408; Dylan 60  calc FeNa: need UCr, Dylan and BMP  IVFs: /hr  renal eval  CT A/P: no hydro on rt; non-obst renal calc on rt; not much seen in terms of bladder cancer or mets    # Acute Transaminitis - 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; s/p CCY; intra/extra hep duct dil - NOT new (CBD 15mm in 2019 and now 17mm)  acute transaminitis (AST/ALT 1694/802) - Liver enzymes WNL in April 2022; PT/PTT nl  CT A/P demonstrates intrahepatic and extrahepatic ductal dilatation, but no masses noted.  lack of eosinophilia  hepatology eval for liver injury  Adv GI eval for duct dil  MRCP (doubt pt will consent to ERCP)  check CEA lvl  LFT q24  RUQ U/S: no thrombosis  acute hepatitis panel (r/o acute viral hepatitis)  obtain autoimmune markers (CARLITO, SMA, anti-LKM1; r/o autoimmune hepatitis)  tylenol and alcohol levels are nl  OK to feed    # Acute on Chronic macro Anemia  Hb 7.0 (baseline Hb 7-9 per HIE)  JOSE R negative  S/P 1u pRBC   f/u reticulocyte count  f/u haptoglobin and LDH   check iron, tibc, ferr, B12, Fol, TSH    # Diarrhea, resolved    # Hypothyroidism  obtain TSH  c/w levothyroxine 75mcg PO QD    # Hypertension  c/w amlodipine 5mg PO QD  c/w Lopressor 25mg PO BID    # Dyslipidemia  hold statin until LFTs better    # COPD - stable  c/w Spiriva 1 puff QD (therapeutic interchange for Incruse Ellipta)    # Depression  c/w duloxetine DR 60mg PO QD    # DVT PPX: heparin 5000U SQ Q12H    # GI PPX: none indicated    # ACTIVITY: Independent     # CODE STATUS: Full Code (I started to have pt to think about DNR and DNI given adv age and her desire to limit some care (like sx))    DISPO: f/u renal, hepat, Adv GI; abx; acute liver w/u as above; anemia w/u; CEA lvl; f/u cx's; c/w abx; check TSH;  eventually, pt will likely go back home, might need home PT - f/u CM

## 2022-07-27 NOTE — ED PROVIDER NOTE - CLINICAL SUMMARY MEDICAL DECISION MAKING FREE TEXT BOX
89 yo woman presents to ED with nausea, weakness, chills and urinary burning.  Workup revealed pyleo, elevated LFT's, and anemia.   Will need admission for transfusion and IV antibiotics.

## 2022-07-27 NOTE — ED ADULT NURSE NOTE - INTERVENTIONS DEFINITIONS
[place] to call system/Call bell, personal items and telephone within reach/Instruct patient to call for assistance/Room bathroom lighting operational/Non-slip footwear when patient is off stretcher/Physically safe environment: no spills, clutter or unnecessary equipment/Stretcher in lowest position, wheels locked, appropriate side rails in place/Monitor for mental status changes and reorient to person, place, and time/Reinforce activity limits and safety measures with patient and family

## 2022-07-27 NOTE — ED PROVIDER NOTE - OBJECTIVE STATEMENT
89-year-old female with history of hypertension, high cholesterol, COPD, lung CA status post left lobectomy, renal cell cancer status post left nephrectomy, hypothyroid, bladder CA, anemia presents to the ED accompanied by daughter complaining of urinary symptoms for a week.  Patient was started on Bactrim by PMD and took it for 5 days.  Patient complaining of constant diarrhea, nausea, weakness and chills for the last 24 hours.  Patient states diarrhea was black.  Patient is taking iron pills and getting iron infusion.  No chest pain, shortness of breath, dizziness, fever.

## 2022-07-27 NOTE — H&P ADULT - CONVERSATION DETAILS
Discussed with Evelyn Rhoades (Daughter-in-Law and HCP) concept of full code vs limited medical management vs palliative/hospice. Understands patient's overall long-term prognosis is poor given multiple comorbidities and active bladder. Overall receptive to limited medical management (such as DNR/DNI), but does not want to make decision without pt's son (Jm Rhoades; also HCP), who is currently out-of-area and will be back by end of week. At this time though, patient is FULL CODE.

## 2022-07-27 NOTE — H&P ADULT - HISTORY OF PRESENT ILLNESS
89F w/ h/o HTN, DLD, COPD, hypothyroidism, iron deficiency anemia, bladder cancer (not on active treatment), RCC (s/p left nephrectomy), -year-old female with history of hypertension, high cholesterol, COPD, lung CA status post left lobectomy, renal cell cancer status post left nephrectomy, hypothyroid, bladder CA, anemia presents to the ED accompanied by daughter complaining of urinary symptoms for a week.  Patient was started on Bactrim by PMD and took it for 5 days.  Patient complaining of constant diarrhea, nausea, weakness and chills for the last 24 hours.  Patient states diarrhea was black.  Patient is taking iron pills and getting iron infusion.  No chest pain, shortness of breath, dizziness, fever. 89F w/ h/o HTN, DLD, COPD, hypothyroidism, iron deficiency anemia, bladder cancer (not on active treatment), RCC (s/p left nephrectomy), and lung cancer (s/p left lobectomy) p/w 4 days of progressively worsening abdominal pain a/w severe diarrhea. Abdominal pain localized to suprapubic area. No known aggravating or alleviating factors. Recently diagnosed w/ UTI by PMD and rx'd Bactrim x5 days. Developed severe diarrhea yesterday (unable to count number of episodes). Stool was black in color, but chronic for pt as she takes oral iron supplementation. Had one episode of diarrhea this AM, but no repeat episodes. Abdominal pain has significantly improved today compared to yesterday. No fevers, CP, SOB, or LE swelling. No recent travel or sick contacts.    In ED, pt's BP 98/52, but improved to 133/58 after 1L NS bolus. UA concerning for UTI, so pt given ceftriaxone x1. Labs demonstrated anemia (Hb 7.0), for which pt received 1u pRBC. Labs also concerning for acute transaminitis. Pt admitted to medicine for further w/u. 89F w/ h/o HTN, DLD, COPD, hypothyroidism, iron deficiency anemia, bladder cancer (not on active treatment), RCC (s/p left nephrectomy), and lung cancer (s/p left lobectomy) p/w 4 days of progressively worsening abdominal pain a/w severe diarrhea. Abdominal pain localized to suprapubic area. No known aggravating or alleviating factors. Recently diagnosed w/ UTI by PMD and rx'd Bactrim x5 days. Developed severe diarrhea yesterday (unable to count number of episodes). Stool was black in color, but chronic for pt as she takes oral iron supplementation. Had one episode of diarrhea this AM, but no repeat episodes. Abdominal pain has significantly improved today compared to yesterday. No fevers, CP, SOB, or LE swelling. No recent travel or sick contacts.    In ED, pt's BP 98/52, but improved to 133/58 after 1L NS bolus. UA concerning for UTI, so pt given ceftriaxone x1. Labs demonstrated anemia (Hb 7.0), for which pt received 1u pRBC. Labs also concerning for acute transaminitis. Pt admitted to medicine for further w/u.    Attd: pt lives on the 1st floor on her own and her family is above her and help her a lot; in the home, pt is quite independent: showers and dresses herself; feeds herself; walks fine w/ cane; does some light chores; pt rarely takes stairs to go to 2nd floor (gets very winded); pt has active bladder cancer, but is not bothered by it; she will NOT go for any more surgeries for the remainder of her life; she will go for tests and accept most care in order to cont living - she feels she has good QOL; she had abd pain and diarrhea; pt took Bactrim for 5 days as outpt - she says the med did not agree w/ her; she is feeling better today than when she arrived;

## 2022-07-28 LAB
ALBUMIN SERPL ELPH-MCNC: 3.5 G/DL — SIGNIFICANT CHANGE UP (ref 3.5–5.2)
ALP SERPL-CCNC: 204 U/L — HIGH (ref 30–115)
ALT FLD-CCNC: 502 U/L — HIGH (ref 0–41)
ANION GAP SERPL CALC-SCNC: 11 MMOL/L — SIGNIFICANT CHANGE UP (ref 7–14)
APAP SERPL-MCNC: <5 UG/ML — LOW (ref 10–30)
APTT BLD: 33.9 SEC — SIGNIFICANT CHANGE UP (ref 27–39.2)
AST SERPL-CCNC: 354 U/L — HIGH (ref 0–41)
BASOPHILS # BLD AUTO: 0.03 K/UL — SIGNIFICANT CHANGE UP (ref 0–0.2)
BASOPHILS NFR BLD AUTO: 0.4 % — SIGNIFICANT CHANGE UP (ref 0–1)
BILIRUB SERPL-MCNC: 0.8 MG/DL — SIGNIFICANT CHANGE UP (ref 0.2–1.2)
BUN SERPL-MCNC: 53 MG/DL — HIGH (ref 10–20)
CALCIUM SERPL-MCNC: 9 MG/DL — SIGNIFICANT CHANGE UP (ref 8.5–10.1)
CHLORIDE SERPL-SCNC: 110 MMOL/L — SIGNIFICANT CHANGE UP (ref 98–110)
CO2 SERPL-SCNC: 16 MMOL/L — LOW (ref 17–32)
CREAT ?TM UR-MCNC: 84 MG/DL — SIGNIFICANT CHANGE UP
CREAT SERPL-MCNC: 3.3 MG/DL — HIGH (ref 0.7–1.5)
EGFR: 12 ML/MIN/1.73M2 — LOW
EOSINOPHIL # BLD AUTO: 0.11 K/UL — SIGNIFICANT CHANGE UP (ref 0–0.7)
EOSINOPHIL NFR BLD AUTO: 1.5 % — SIGNIFICANT CHANGE UP (ref 0–8)
ETHANOL SERPL-MCNC: <10 MG/DL — SIGNIFICANT CHANGE UP
GLUCOSE SERPL-MCNC: 141 MG/DL — HIGH (ref 70–99)
HCT VFR BLD CALC: 25.2 % — LOW (ref 37–47)
HGB BLD-MCNC: 8 G/DL — LOW (ref 12–16)
IMM GRANULOCYTES NFR BLD AUTO: 0.6 % — HIGH (ref 0.1–0.3)
INR BLD: 1.07 RATIO — SIGNIFICANT CHANGE UP (ref 0.65–1.3)
LDH SERPL L TO P-CCNC: 411 — HIGH (ref 50–242)
LYMPHOCYTES # BLD AUTO: 0.64 K/UL — LOW (ref 1.2–3.4)
LYMPHOCYTES # BLD AUTO: 9 % — LOW (ref 20.5–51.1)
MAGNESIUM SERPL-MCNC: 1.9 MG/DL — SIGNIFICANT CHANGE UP (ref 1.8–2.4)
MCHC RBC-ENTMCNC: 31.7 G/DL — LOW (ref 32–37)
MCHC RBC-ENTMCNC: 31.7 PG — HIGH (ref 27–31)
MCV RBC AUTO: 100 FL — HIGH (ref 81–99)
MONOCYTES # BLD AUTO: 0.61 K/UL — HIGH (ref 0.1–0.6)
MONOCYTES NFR BLD AUTO: 8.5 % — SIGNIFICANT CHANGE UP (ref 1.7–9.3)
NEUTROPHILS # BLD AUTO: 5.71 K/UL — SIGNIFICANT CHANGE UP (ref 1.4–6.5)
NEUTROPHILS NFR BLD AUTO: 80 % — HIGH (ref 42.2–75.2)
NRBC # BLD: 0 /100 WBCS — SIGNIFICANT CHANGE UP (ref 0–0)
OSMOLALITY SERPL: 304 MOS/KG — HIGH (ref 280–301)
OSMOLALITY UR: 408 MOS/KG — SIGNIFICANT CHANGE UP (ref 50–1200)
PHOSPHATE SERPL-MCNC: 3.6 MG/DL — SIGNIFICANT CHANGE UP (ref 2.1–4.9)
PLATELET # BLD AUTO: 104 K/UL — LOW (ref 130–400)
POTASSIUM SERPL-MCNC: 4.7 MMOL/L — SIGNIFICANT CHANGE UP (ref 3.5–5)
POTASSIUM SERPL-SCNC: 4.7 MMOL/L — SIGNIFICANT CHANGE UP (ref 3.5–5)
PROT ?TM UR-MCNC: 123 MG/DLG/24H — SIGNIFICANT CHANGE UP
PROT SERPL-MCNC: 5.8 G/DL — LOW (ref 6–8)
PROT/CREAT UR-RTO: 1.5 RATIO — HIGH (ref 0–0.2)
PROTHROM AB SERPL-ACNC: 12.3 SEC — SIGNIFICANT CHANGE UP (ref 9.95–12.87)
RBC # BLD: 2.52 M/UL — LOW (ref 4.2–5.4)
RBC # BLD: 2.52 M/UL — LOW (ref 4.2–5.4)
RBC # FLD: 16.9 % — HIGH (ref 11.5–14.5)
RETICS #: 119.2 K/UL — SIGNIFICANT CHANGE UP (ref 25–125)
RETICS/RBC NFR: 4.7 % — HIGH (ref 0.5–1.5)
SODIUM SERPL-SCNC: 137 MMOL/L — SIGNIFICANT CHANGE UP (ref 135–146)
SODIUM UR-SCNC: 60 MMOL/L — SIGNIFICANT CHANGE UP
UUN UR-MCNC: 614 MG/DL — SIGNIFICANT CHANGE UP
WBC # BLD: 7.14 K/UL — SIGNIFICANT CHANGE UP (ref 4.8–10.8)
WBC # FLD AUTO: 7.14 K/UL — SIGNIFICANT CHANGE UP (ref 4.8–10.8)

## 2022-07-28 PROCEDURE — 99223 1ST HOSP IP/OBS HIGH 75: CPT

## 2022-07-28 RX ORDER — SODIUM CHLORIDE 9 MG/ML
1000 INJECTION INTRAMUSCULAR; INTRAVENOUS; SUBCUTANEOUS
Refills: 0 | Status: DISCONTINUED | OUTPATIENT
Start: 2022-07-28 | End: 2022-07-29

## 2022-07-28 RX ADMIN — HEPARIN SODIUM 5000 UNIT(S): 5000 INJECTION INTRAVENOUS; SUBCUTANEOUS at 05:28

## 2022-07-28 RX ADMIN — DULOXETINE HYDROCHLORIDE 60 MILLIGRAM(S): 30 CAPSULE, DELAYED RELEASE ORAL at 11:24

## 2022-07-28 RX ADMIN — Medication 75 MICROGRAM(S): at 05:28

## 2022-07-28 RX ADMIN — AMLODIPINE BESYLATE 5 MILLIGRAM(S): 2.5 TABLET ORAL at 05:28

## 2022-07-28 RX ADMIN — Medication 25 MILLIGRAM(S): at 05:27

## 2022-07-28 RX ADMIN — SODIUM CHLORIDE 100 MILLILITER(S): 9 INJECTION INTRAMUSCULAR; INTRAVENOUS; SUBCUTANEOUS at 11:24

## 2022-07-28 RX ADMIN — TIOTROPIUM BROMIDE 1 CAPSULE(S): 18 CAPSULE ORAL; RESPIRATORY (INHALATION) at 08:15

## 2022-07-28 RX ADMIN — CEFTRIAXONE 100 MILLIGRAM(S): 500 INJECTION, POWDER, FOR SOLUTION INTRAMUSCULAR; INTRAVENOUS at 11:27

## 2022-07-28 RX ADMIN — Medication 25 MILLIGRAM(S): at 17:15

## 2022-07-28 RX ADMIN — HEPARIN SODIUM 5000 UNIT(S): 5000 INJECTION INTRAVENOUS; SUBCUTANEOUS at 17:18

## 2022-07-28 NOTE — CONSULT NOTE ADULT - ASSESSMENT
Patient is a 89 y/o female with PMHx HTN, DLD, COPD, hypothyroidism, iron deficiency anemia, bladder cancer (not on active treatment), RCC (s/p left nephrectomy), and lung cancer (s/p left lobectomy) who presented with 4 days of progressively worsening abdominal pain and Melena.  Recently diagnosed w/ UTI by PMD and rx'd Bactrim x5 days. Currently pain resolved. Abdominal U/S and CT notable for CBD dilation ( she had as well on past imaging along with absent Gallbladder). She has Diverticulosis and Ductal dilation could be from duodenal diverticulum along with post CCY state and age. Likely LFT pattern from [facility] ( Abdominal U/S vasculature reassuring). T catherine improved.     Anemia  - Work up noted from Primary Team  - Would Add Pantoprazole 40mg daily  - While she is on Fe, she described Melena which has resolved, counts stable along with hemodynamics     Abnormal LFT  - Likely from Macrobid  - Doppler of Liver reassuring  - LFT much improved- will further improve with diet and avoidance of inciting drug  - Hepatology serologies ordered by primary team noted    CBD dilation   - Absent Gallbladder, Age, and Diverticulosis all likely culprit   - Non tender on exam, No Pancreatic Mass noted on current imaging nor prior imaging   - Given Age, functional status, resolution of levels, and resolution of pain, would hold off on further Endoscopic evaluation

## 2022-07-28 NOTE — CONSULT NOTE ADULT - ASSESSMENT
89F w/ h/o HTN, DLD, COPD, hypothyroidism, iron deficiency anemia, bladder cancer (not on active treatment), CKD 4, RCC (s/p left nephrectomy), and lung cancer (s/p left lobectomy) p/w 4 days of progressively worsening abdominal pain a/w severe diarrhea and UTI. Recently diagnosed w/ UTI by PMD and rx'd Bactrim x5 days. Developed severe diarrhea yesterday.     Cr 3.5 on admission, BUN 58 (BUN/Cr ratio ~18)  Baseline Cr 1.8-2.2   SBP 98 on admission    Impression:   - MANA on CKD 4 - multifactorial: likely prerenal vs. ATN in setting of dehydration (diarrhea); also w/ bactrim use     Recommendations:   - monitor i/o, get urine lytes   - c/w NS 100cc/hr     Incomplete note pending discussion with attending    89F w/ h/o HTN, DLD, COPD, hypothyroidism, iron deficiency anemia, bladder cancer (not on active treatment), CKD 4, RCC (s/p left nephrectomy), and lung cancer (s/p left lobectomy) p/w 4 days of progressively worsening abdominal pain a/w severe diarrhea and UTI. Recently diagnosed w/ UTI by PMD and rx'd Bactrim x5 days. Developed severe diarrhea yesterday.     Cr 3.5 on admission, BUN 58 (BUN/Cr ratio ~18)  Baseline Cr 1.8-2.2   SBP 98 on admission  VBG pH 7.2, HCO3 17     Impression:   - MANA on CKD 4 - multifactorial: likely prerenal vs. ATN in setting of dehydration (diarrhea); also w/ bactrim use   - NAGMA likely secondary to diarrhea     Recommendations:   - monitor i/o, get urine lytes   - trend creatinine, f/u am labs   - c/w NS 100cc/hr     Incomplete note pending discussion with attending    89F w/ h/o HTN, DLD, COPD, hypothyroidism, iron deficiency anemia, bladder cancer (not on active treatment), CKD 4, RCC (s/p left nephrectomy), and lung cancer (s/p left lobectomy) p/w 4 days of progressively worsening abdominal pain a/w severe diarrhea and UTI. Recently diagnosed w/ UTI by PMD and rx'd Bactrim x5 days. Developed severe diarrhea yesterday.     Cr 3.5 on admission, BUN 58 (BUN/Cr ratio ~18)  Baseline Cr 1.8-2.2   SBP 98 on admission  VBG pH 7.2, HCO3 17     Impression:   - MANA on CKD 4 - multifactorial: likely prerenal vs. ATN in setting of dehydration (diarrhea); also w/ bactrim use   - NAGMA likely secondary to diarrhea     Recommendations:   - monitor i/o, get urine lytes   - trend creatinine, f/u am labs   - c/w NS 100cc/hr   - would avoid bactrim on discharge, f/u ID regarding abx regimen     Incomplete note pending discussion with attending    89F w/ h/o HTN, DLD, COPD, hypothyroidism, iron deficiency anemia, bladder cancer (not on active treatment), CKD 4, RCC (s/p left nephrectomy), and lung cancer (s/p left lobectomy) p/w 4 days of progressively worsening abdominal pain a/w severe diarrhea and UTI. Recently diagnosed w/ UTI by PMD and rx'd Bactrim x5 days. Developed severe diarrhea yesterday.     Cr 3.5 on admission, BUN 58 (BUN/Cr ratio ~18)  Baseline Cr 1.8-2.2   SBP 98 on admission  VBG pH 7.2, HCO3 17     Impression:   - MANA on CKD 4 - multifactorial: likely prerenal vs. ATN in setting of dehydration (diarrhea); also w/ bactrim use   - NAGMA likely secondary to diarrhea     Recommendations:   - monitor i/o, get urine lytes   - trend creatinine, f/u am labs   - c/w NS 100cc/hr   - would avoid bactrim on discharge, f/u ID regarding abx regimen

## 2022-07-28 NOTE — CONSULT NOTE ADULT - SUBJECTIVE AND OBJECTIVE BOX
NEPHROLOGY CONSULTATION NOTE    89F w/ h/o HTN, DLD, COPD, hypothyroidism, iron deficiency anemia, bladder cancer (not on active treatment), RCC (s/p left nephrectomy), and lung cancer (s/p left lobectomy) p/w 4 days of progressively worsening abdominal pain a/w severe diarrhea. Abdominal pain localized to suprapubic area. No known aggravating or alleviating factors. Recently diagnosed w/ UTI by PMD and rx'd Bactrim x5 days. Developed severe diarrhea yesterday (unable to count number of episodes). Stool was black in color, but chronic for pt as she takes oral iron supplementation. Had one episode of diarrhea this AM, but no repeat episodes. Abdominal pain has significantly improved today compared to yesterday. No fevers, CP, SOB, or LE swelling. No recent travel or sick contacts.    In ED, pt's BP 98/52, but improved to 133/58 after 1L NS bolus. UA concerning for UTI, so pt given ceftriaxone x1. Labs demonstrated anemia (Hb 7.0), for which pt received 1u pRBC. Labs also concerning for acute transaminitis. Pt admitted to medicine for further w/u.      PAST MEDICAL & SURGICAL HISTORY:  HTN (hypertension)      COPD with emphysema      Hypothyroidism      Bladder cancer  not being actively treated      Osteoarthritis      Dyslipidemia      History of hip replacement, total, right      History of total left knee replacement (TKR)      History of nephrectomy  Left      Malignant neoplasm of urinary bladder, unspecified site  Removal x 3      H/O breast surgery  25% calcification removed      History of lung surgery        Allergies:  Contrast Dye (Hives; Rash)  sulfa drugs (Urticaria)    Home Medications Reviewed  Hospital Medications:   MEDICATIONS  (STANDING):  amLODIPine   Tablet 5 milliGRAM(s) Oral daily  cefTRIAXone   IVPB 1000 milliGRAM(s) IV Intermittent every 24 hours  DULoxetine 60 milliGRAM(s) Oral daily  heparin   Injectable 5000 Unit(s) SubCutaneous every 12 hours  levothyroxine 75 MICROGram(s) Oral daily  metoprolol tartrate 25 milliGRAM(s) Oral two times a day  sodium chloride 0.9%. 1000 milliLiter(s) (100 mL/Hr) IV Continuous <Continuous>  tiotropium 18 MICROgram(s) Capsule 1 Capsule(s) Inhalation daily    SOCIAL HISTORY:  Denies ETOH,Smoking,   FAMILY HISTORY:  Family history of cancer in sister (Sibling)        REVIEW OF SYSTEMS:  CONSTITUTIONAL: No weakness, fevers or chills  EYES/ENT: No visual changes;  No vertigo or throat pain   NECK: No pain or stiffness  RESPIRATORY: No cough, wheezing, hemoptysis; No shortness of breath  CARDIOVASCULAR: No chest pain or palpitations.  GASTROINTESTINAL: No abdominal or epigastric pain. No nausea, vomiting, or hematemesis; No diarrhea or constipation. No melena or hematochezia.  GENITOURINARY: No dysuria, frequency, foamy urine, urinary urgency, incontinence or hematuria  NEUROLOGICAL: No numbness or weakness  SKIN: No itching, burning, rashes, or lesions   VASCULAR: No bilateral lower extremity edema.   All other review of systems is negative unless indicated above.    VITALS:  Vital Signs Last 24 Hrs  T(C): 35.8 (2022 05:07), Max: 37 (2022 18:00)  T(F): 96.5 (2022 05:07), Max: 98.6 (2022 18:00)  HR: 77 (2022 05:07) (62 - 77)  BP: 151/67 (2022 05:07) (115/62 - 168/73)  BP(mean): --  RR: 18 (2022 05:07) (17 - 18)  SpO2: 99% (2022 18:00) (93% - 99%)    Parameters below as of 2022 18:00  Patient On (Oxygen Delivery Method): room air        Height (cm): 162.6 ( @ 15:57)  Weight (kg): 66.8 ( @ 19:02)  BMI (kg/m2): 25.3 ( @ 19:02)  BSA (m2): 1.72 ( @ 19:02)  PHYSICAL EXAM:  Constitutional: NAD  HEENT: anicteric sclera, oropharynx clear, MMM  Neck: No JVD  Respiratory: CTAB, no wheezes, rales or rhonchi  Cardiovascular: S1, S2, RRR  Gastrointestinal: BS+, soft, NT/ND  Extremities: No cyanosis or clubbing. No peripheral edema  Neurological: A/O x 3, no focal deficits  Psychiatric: Normal mood, normal affect  : No CVA tenderness. No enrique.   Skin: No rashes  Vascular Access:    LABS:      132<L>  |  103  |  58<H>  ----------------------------<  128<H>  5.2<H>   |  17  |  3.5<H>    Ca    9.2      2022 07:54    TPro  6.0  /  Alb  3.9  /  TBili  2.4<H>  /  DBili  1.8<H>  /  AST  1694  /  ALT  802  /  AlkPhos  198<H>      Creatinine Trend: 3.5 <--                        8.0    7.14  )-----------( 104      ( 2022 09:34 )             25.2     Urine Studies:  Urinalysis Basic - ( 2022 13:25 )    Color: Orange / Appearance: Clear / S.025 / pH:   Gluc:  / Ketone: Negative  / Bili: Large / Urobili: <2 mg/dL   Blood:  / Protein: 300 mg/dL / Nitrite: Positive   Leuk Esterase: Large / RBC: 32 /HPF /  /HPF   Sq Epi:  / Non Sq Epi: 8 /HPF / Bacteria: Moderate                RADIOLOGY & ADDITIONAL STUDIES:                 NEPHROLOGY CONSULTATION NOTE    89F w/ h/o HTN, DLD, COPD, hypothyroidism, iron deficiency anemia, bladder cancer (not on active treatment), RCC (s/p left nephrectomy), and lung cancer (s/p left lobectomy) p/w 4 days of progressively worsening abdominal pain a/w severe diarrhea. Abdominal pain localized to suprapubic area. No known aggravating or alleviating factors. Recently diagnosed w/ UTI by PMD and rx'd Bactrim x5 days. Developed severe diarrhea yesterday (unable to count number of episodes). Stool was black in color, but chronic for pt as she takes oral iron supplementation. Had one episode of diarrhea this AM, but no repeat episodes. Abdominal pain has significantly improved today compared to yesterday. No fevers, CP, SOB, or LE swelling. No recent travel or sick contacts.    In ED, pt's BP 98/52, but improved to 133/58 after 1L NS bolus. UA concerning for UTI, so pt given ceftriaxone x1. Labs demonstrated anemia (Hb 7.0), for which pt received 1u pRBC. Labs also concerning for acute transaminitis. Pt admitted to medicine for further w/u.      PAST MEDICAL & SURGICAL HISTORY:  HTN (hypertension)      COPD with emphysema      Hypothyroidism      Bladder cancer  not being actively treated      Osteoarthritis      Dyslipidemia      History of hip replacement, total, right      History of total left knee replacement (TKR)      History of nephrectomy  Left      Malignant neoplasm of urinary bladder, unspecified site  Removal x 3      H/O breast surgery  25% calcification removed      History of lung surgery        Allergies:  Contrast Dye (Hives; Rash)  sulfa drugs (Urticaria)    Home Medications Reviewed  Hospital Medications:   MEDICATIONS  (STANDING):  amLODIPine   Tablet 5 milliGRAM(s) Oral daily  cefTRIAXone   IVPB 1000 milliGRAM(s) IV Intermittent every 24 hours  DULoxetine 60 milliGRAM(s) Oral daily  heparin   Injectable 5000 Unit(s) SubCutaneous every 12 hours  levothyroxine 75 MICROGram(s) Oral daily  metoprolol tartrate 25 milliGRAM(s) Oral two times a day  sodium chloride 0.9%. 1000 milliLiter(s) (100 mL/Hr) IV Continuous <Continuous>  tiotropium 18 MICROgram(s) Capsule 1 Capsule(s) Inhalation daily    SOCIAL HISTORY:  Denies ETOH,Smoking,   FAMILY HISTORY:  Family history of cancer in sister (Sibling)        REVIEW OF SYSTEMS:  CONSTITUTIONAL: No weakness, fevers or chills  EYES/ENT: No visual changes;  No vertigo or throat pain   NECK: No pain or stiffness  RESPIRATORY: No cough, wheezing, hemoptysis; No shortness of breath  CARDIOVASCULAR: No chest pain or palpitations.  GASTROINTESTINAL: No abdominal or epigastric pain. No nausea, vomiting, or hematemesis; No diarrhea or constipation. No melena or hematochezia.  GENITOURINARY: No dysuria, frequency, foamy urine, urinary urgency, incontinence or hematuria  NEUROLOGICAL: No numbness or weakness  SKIN: No itching, burning, rashes, or lesions   VASCULAR: No bilateral lower extremity edema.   All other review of systems is negative unless indicated above.    VITALS:  Vital Signs Last 24 Hrs  T(C): 35.8 (2022 05:07), Max: 37 (2022 18:00)  T(F): 96.5 (2022 05:07), Max: 98.6 (2022 18:00)  HR: 77 (2022 05:07) (62 - 77)  BP: 151/67 (2022 05:07) (115/62 - 168/73)  BP(mean): --  RR: 18 (2022 05:07) (17 - 18)  SpO2: 99% (2022 18:00) (93% - 99%)    Parameters below as of 2022 18:00  Patient On (Oxygen Delivery Method): room air        Height (cm): 162.6 ( @ 15:57)  Weight (kg): 66.8 ( @ 19:02)  BMI (kg/m2): 25.3 ( @ 19:02)  BSA (m2): 1.72 ( @ 19:02)  PHYSICAL EXAM:  Constitutional: NAD  HEENT: anicteric sclera, oropharynx clear, MMM  Neck: No JVD  Respiratory: CTAB, no wheezes, rales or rhonchi  Cardiovascular: S1, S2, RRR  Gastrointestinal: BS+, soft, NT/ND  Extremities: No cyanosis or clubbing. No peripheral edema  Neurological: A/O x 3, no focal deficits  Psychiatric: Normal mood, normal affect  : No CVA tenderness. No enrique.   Skin: No rashes  Vascular Access:    LABS:      132<L>  |  103  |  58<H>  ----------------------------<  128<H>  5.2<H>   |  17  |  3.5<H>    Ca    9.2      2022 07:54    TPro  6.0  /  Alb  3.9  /  TBili  2.4<H>  /  DBili  1.8<H>  /  AST  1694  /  ALT  802  /  AlkPhos  198<H>      Creatinine Trend: 3.5 <--                        8.0    7.14  )-----------( 104      ( 2022 09:34 )             25.2     Urine Studies:  Urinalysis Basic - ( 2022 13:25 )    Color: Orange / Appearance: Clear / S.025 / pH:   Gluc:  / Ketone: Negative  / Bili: Large / Urobili: <2 mg/dL   Blood:  / Protein: 300 mg/dL / Nitrite: Positive   Leuk Esterase: Large / RBC: 32 /HPF /  /HPF   Sq Epi:  / Non Sq Epi: 8 /HPF / Bacteria: Moderate                RADIOLOGY & ADDITIONAL STUDIES:    < from: CT Abdomen and Pelvis No Cont (22 @ 09:20) >  KIDNEYS: Prior left nephrectomy. Multiple nonobstructive right renal   calculi. Multiple right renal cysts. Hyperdense cortical lesion in the   right kidney that measures 1 cm, stable since prior CT and likely   representing hemorrhagic cyst. There is fluid posterior to the right   kidneywhich appears more dense on the present study. Questionable small   amount of hemorrhage.    ABDOMINOPELVIC NODES: No lymphadenopathy..    PELVIC ORGANS: Unremarkable.    PERITONEUM/MESENTERY/BOWEL: Colonic diverticulosis with no radiographic   evidence of diverticulitis. Left 1 cm calcification, stable since prior   CT..    BONES/SOFT TISSUES: Right hip arthroplasty. Stable chronic fracture   deformities in the pelvis. Degenerative changes in the spine.    OTHER: Calcific atherosclerosis of the abdominal aorta and its branches.   Infrarenal aortic aneurysm has slightly increased in size measuring 4.2   cm (previously 3.6 cm).      IMPRESSION:    Small amount of fluid seen adjacent to lower pole the right kidney which   appears hyperdense. Questionable hemorrhage. Follow-up is therefore   recommended.    Infrarenal aortic aneurysm slightly increased in size now measuring 4.3   cm.    Increasing intrahepatic and extra back bili ductal dilatation. If there   are elevated liver function tests, correlate with MRCP on an outpatient   basis.    These findings were discussed with  during time interpretation   on 2022 at 10:30 AM    --- End of Report ---      < end of copied text >

## 2022-07-28 NOTE — CONSULT NOTE ADULT - SUBJECTIVE AND OBJECTIVE BOX
Patient is a 89 y/o female with PMHx HTN, DLD, COPD, hypothyroidism, iron deficiency anemia, bladder cancer (not on active treatment), RCC (s/p left nephrectomy), and lung cancer (s/p left lobectomy) who presented with 4 days of progressively worsening abdominal pain and Melena.  Recently diagnosed w/ UTI by PMD and rx'd Bactrim x5 days. Developed severe diarrhea . Stool was black in color. she notes only for one day and since resoved. she has no current abdominal pain and is requesting when she will go home.       PAST MEDICAL & SURGICAL HISTORY:  HTN (hypertension)  COPD with emphysema  Hypothyroidism  Bladder cancer not being actively treated  Osteoarthritis  Dyslipidemia  History of hip replacement, total, right  History of total left knee replacement (TKR)  History of nephrectomy Left  Malignant neoplasm of urinary bladder, unspecified site  H/O breast surgery  History of lung surgery    Family Hx:  Father: Non Contributory   Mother: Non Contributory    Social History:  Currently lives with son and daughter-in-law (both of whom are HCP). Ambulates with a cane.  Denies current ETOH or Tobacco use         MEDICATIONS  (STANDING):  amLODIPine   Tablet 5 milliGRAM(s) Oral daily  cefTRIAXone   IVPB 1000 milliGRAM(s) IV Intermittent every 24 hours  DULoxetine 60 milliGRAM(s) Oral daily  heparin   Injectable 5000 Unit(s) SubCutaneous every 12 hours  levothyroxine 75 MICROGram(s) Oral daily  metoprolol tartrate 25 milliGRAM(s) Oral two times a day  sodium chloride 0.9%. 1000 milliLiter(s) (100 mL/Hr) IV Continuous <Continuous>  tiotropium 18 MICROgram(s) Capsule 1 Capsule(s) Inhalation daily    MEDICATIONS  (PRN):      Allergies  Contrast Dye (Hives; Rash)  sulfa drugs (Urticaria)      Review of Systems  General:  Denies Fatigue, Denies Fever, Denies Weakness ,Denies Weight Loss   HEENT: Denies Trouble Swallowing ,Denies  Sore Throat , Denies Change in hearing/vision/speech ,Denies Dizziness    Cardio: Denies  Chest Pain , Palpitations    Respiratory: Denies worsening of SOB, Denies Cough  Abdomen: See detailed HPI  Neuro: Denies Headache Denies Dizziness, Denies Paresthesias  MSK: Denies pain in Bones/Joints/Muscles   Psych: Patient denies depression, denies suicidal or homicidal ideations  Integ: Patient Denies rash, or new skin lesions       Vital Signs  T(F): 96.5 (28 Jul 2022 05:07), Max: 98.6 (27 Jul 2022 18:00)  HR: 77 (28 Jul 2022 05:07) (62 - 77)  BP: 151/67 (28 Jul 2022 05:07) (115/62 - 168/73)  RR: 18 (28 Jul 2022 05:07) (17 - 18)  SpO2: 99% (27 Jul 2022 18:00) (93% - 99%)    Parameters below as of 27 Jul 2022 18:00  Patient On (Oxygen Delivery Method): room air    Physical Exam  Gen: NAD  HEENT: NC/AT, Mucosal Membranes Moist  Neck: Supple  Cardio: S1/S2 No S3/S4, Regular  Resp: CTA B/L  Abdomen: Soft, ND/NT  Neuro: AAOx3  Extremities: FROM x 4        Labs:                          8.0    7.14  )-----------( 104      ( 28 Jul 2022 09:34 )             25.2       Auto Neutrophil %: 80.0 % (07-28-22 @ 09:34)  Auto Immature Granulocyte %: 0.6 % (07-28-22 @ 09:34)    07-28    137  |  110  |  53<H>  ----------------------------<  141<H>  4.7   |  16<L>  |  3.3<H>      Calcium, Total Serum: 9.0 mg/dL (07-28-22 @ 09:34)      LFTs:             5.8  | 0.8  | 354      ------------------[204     ( 28 Jul 2022 09:34 )  3.5  | x    | 502         Blood Gas Venous - Lactate: 1.10 mmol/L (07-27-22 @ 08:00)  Lactate, Blood: 1.4 mmol/L (07-27-22 @ 07:54)      Coags:     12.30  ----< 1.07    ( 28 Jul 2022 09:34 )     33.9          Alcohol, Blood: <10 mg/dL (07-28-22 @ 09:34)      RADIOLOGY & ADDITIONAL STUDIES:  US Abdomen Upper Quadrant Right (07.27.22 @ 21:39) >  IMPRESSION:  1.  No definite sonographic evidence of liver parenchymal abnormality.  2.  Surgically absent gallbladder with intra and extrahepatic biliary   ductal dilatation. Common bile duct measures 17 mm on the current exam,   slightly increased from CT from 2019 it measured approximately 15 mm.  3.  Normal hepatic Doppler.      CT Abdomen and Pelvis No Cont 7.27.22   IMPRESSION:    Small amount of fluid seen adjacent to lower pole the right kidney which   appears hyperdense. Questionable hemorrhage. Follow-up is therefore   recommended.    Infrarenal aortic aneurysm slightly increased in size now measuring 4.3   cm.    Increasing intrahepatic and extra back bili ductal dilatation. If there   are elevated liver function tests, correlate with MRCP on an outpatient   basis.

## 2022-07-28 NOTE — PROGRESS NOTE ADULT - SUBJECTIVE AND OBJECTIVE BOX
DRAKE QUIÑONES 89y Female  MRN#: [id number]   Hospital Day: 1d    SUBJECTIVE  Patient is a 89y old Female who presents with a chief complaint of Abdominal Pain (2022 20:26)  Currently admitted to medicine with the primary diagnosis of MANA, acute liver injury, and possible UTI      INTERVAL HPI AND OVERNIGHT EVENTS:  Patient was examined and seen at bedside. This morning she is resting comfortably in bed and reports no issues or overnight events.    REVIEW OF SYMPTOMS:  Denies all.    OBJECTIVE  PAST MEDICAL & SURGICAL HISTORY  HTN (hypertension)    COPD with emphysema    Hypothyroidism    Bladder cancer  not being actively treated    Osteoarthritis    Dyslipidemia    History of hip replacement, total, right    History of total left knee replacement (TKR)    History of nephrectomy  Left    Malignant neoplasm of urinary bladder, unspecified site  Removal x 3    H/O breast surgery  25% calcification removed    History of lung surgery      ALLERGIES:  Contrast Dye (Hives; Rash)  sulfa drugs (Urticaria)    MEDICATIONS:  STANDING MEDICATIONS  amLODIPine   Tablet 5 milliGRAM(s) Oral daily  cefTRIAXone   IVPB 1000 milliGRAM(s) IV Intermittent every 24 hours  DULoxetine 60 milliGRAM(s) Oral daily  heparin   Injectable 5000 Unit(s) SubCutaneous every 12 hours  levothyroxine 75 MICROGram(s) Oral daily  metoprolol tartrate 25 milliGRAM(s) Oral two times a day  sodium chloride 0.9%. 1000 milliLiter(s) IV Continuous <Continuous>  tiotropium 18 MICROgram(s) Capsule 1 Capsule(s) Inhalation daily    PRN MEDICATIONS      VITAL SIGNS: Last 24 Hours  T(C): 35.8 (2022 05:07), Max: 37 (2022 18:00)  T(F): 96.5 (2022 05:07), Max: 98.6 (2022 18:00)  HR: 77 (2022 05:07) (62 - 77)  BP: 151/67 (2022 05:07) (115/62 - 168/73)  BP(mean): --  RR: 18 (2022 05:07) (17 - 18)  SpO2: 99% (2022 18:00) (93% - 99%)    LABS:                        8.0    7.14  )-----------( 104      ( 2022 09:34 )             25.2     07-27    132<L>  |  103  |  58<H>  ----------------------------<  128<H>  5.2<H>   |  17  |  3.5<H>    Ca    9.2      2022 07:54    TPro  6.0  /  Alb  3.9  /  TBili  2.4<H>  /  DBili  1.8<H>  /  AST  1694  /  ALT  802  /  AlkPhos  198<H>      PT/INR - ( 2022 09:34 )   PT: 12.30 sec;   INR: 1.07 ratio         PTT - ( 2022 09:34 )  PTT:33.9 sec  Urinalysis Basic - ( 2022 13:25 )    Color: Orange / Appearance: Clear / S.025 / pH: x  Gluc: x / Ketone: Negative  / Bili: Large / Urobili: <2 mg/dL   Blood: x / Protein: 300 mg/dL / Nitrite: Positive   Leuk Esterase: Large / RBC: 32 /HPF /  /HPF   Sq Epi: x / Non Sq Epi: 8 /HPF / Bacteria: Moderate      RADIOLOGY:    < from: CT Abdomen and Pelvis No Cont (22 @ 09:20) >  Small amount of fluid seen adjacent to lower pole the right kidney which   appears hyperdense. Questionable hemorrhage. Follow-up is therefore   recommended.    Infrarenal aortic aneurysm slightly increased in size now measuring 4.3   cm.    Increasing intrahepatic and extra back bili ductal dilatation. If there   are elevated liver function tests, correlate with MRCP on an outpatient   basis.    These findings were discussed with  during time interpretation   on 2022 at 10:30 AM      < end of copied text >      PHYSICAL EXAM:  CONSTITUTIONAL: No acute distress, well-developed, well-groomed, AAOx3  HEAD: Atraumatic, normocephalic  EYES: EOM intact, PERRLA, conjunctiva and sclera clear  ENT: Moist mucous membranes  PULMONARY: Clear to auscultation bilaterally  CARDIOVASCULAR: Regular rate and rhythm  GASTROINTESTINAL: Soft, non-tender, non-distended; bowel sounds present  MUSCULOSKELETAL: 2+ peripheral pulses; no clubbing, no cyanosis, no edema  SKIN: No rashes or lesions; warm and dry

## 2022-07-28 NOTE — CONSULT NOTE ADULT - ATTENDING COMMENTS
Pt with PMH of Bladder Ca, Lt nephrectomy, Lung Ca, HTN, admitted with diarrhea, found to have GRACY.    Gracy on CKD 4 - baseline creat 2 .0 mg%    due to volume depletion likely prerenal  hypotensive on admission  CTAP no hydro on the right kidney, multiple nonobstructing calculi, 1 cm old lesion, likely hemorrhagic cyst  cont  cc/hr    Met acidosis add na bicarb 1300 mg bid  Hyponatremia - check Urine Osm Na  TSH  free water restriction 1.2 L  encourage solute intake    Diarrhea w/u    will follow

## 2022-07-28 NOTE — PROGRESS NOTE ADULT - ASSESSMENT
ASSESSMENT & PLAN:  89F w/ h/o HTN, DLD, COPD, hypothyroidism, iron deficiency anemia, bladder cancer (not on active treatment), RCC (s/p left nephrectomy), and lung cancer (s/p left lobectomy) p/w 4 days of progressively worsening abdominal pain a/w severe diarrhea. Admission labs concerning for acute transaminitis, MANA, and acute on chronic anemia.     #Urinary Tract Infection  - Initially p/w suprapubic abdominal pain. Recently rx'd Bactrim x5 days. However, likely mis-dosed as pt's baseline CrCl ~15, yet prescribed Bactrim DS BID (per UpToDate, renal dosing at this CrCl should be SS BID or even QD). UA concerning for UTI (positive nitrite, large LE, , and moderate bacteria). No evidence of sepsis present on admission.     Plan:  - c/w ceftriaxone 1g IV QD  - f/u UCx final report    #Acute Kidney Injury  -Admission labs significant for Cr 3.5 (previously Cr 2.2 in Apr 2022), concerning for MANA. Suspect pre-renal azotemia vs ATN secondary to renal ischemia (BP 98/52 on admission) in setting of hypovolemia due to excessive diarrhea. However, may also be related to recent Bactrim use as TMP is known to decrease tubular secretion of creatinine. Should also suspect RTA, type IV which can be a/w Bactrim use, but lower suspicion as K only 5.2 and slight hemolyzed (should suspect if K>5.5 on repeat in AM).    Plan:  - trend Cr via daily BMP/CMP  - f/u urine lytes to calculate FeNa/FeUrea (may be inaccurate in setting of NS bolus given in ED)  - start gentle hydration (after obtaining lytes) w/ LR @ 75 cc/hr    #Acute on Chronic Anemia  - Admission labs significant Hb 7.0 (baseline Hb 7-9 per HIE). "Black stools" reported on admission, but JOSE R negative. S/P 1u pRBC total this admission.    Plan:  - Trend Hb via daily CBC; transfuse if Hb<7  - Maintain active T&S   - obtain reticulocyte count (evaluate bone marrow response)  - obtain haptoglobin and LDH (r/o hemolysis as Bactrim can be a/w hemolytic anemia)  - limited utility in iron studies/ferritin given recent transfusion    #Diarrhea, resolved  - Initially p/w severe diarrhea that has since resolved. Suspect antibiotic-associated diarrhea in setting of recent Bactrim use.     Plan;  - f/u stool culture  - consider C diff if diarrhea returns and >3 episodes in 24 hrs    #Acute Transaminitis  #Drug-Induced Liver Injury, suspected  -Admission labs significant for acute transaminitis (AST/ALT 1694/802). Liver enzymes WNL in April 2022. R factor 11.4 suggests hepatocellular injury. CTAP demonstrates intrahepatic and extrahepatic ductal dilatation, but no masses noted. Overall concerning for drug-induced liver injury in setting of recent Bactrim use. Low suspicion for DRESS given lack of eosinophilia. Low suspicion for acute cholecystitis given h/o CCY.     Plan:  - trend AST/ALT via daily CMP  - obtain PT/PTT (evaluate liver synthetic function)  - obtain US RUQ w/ Doppler (r/o portal vein thrombosis given h/o multiple cancers)  - obtain acute hepatitis panel (r/o acute viral hepatitis)  - obtain autoimmune markers (CARLITO, SMA, anti-LKM1; r/o autoimmune hepatitis)  - obtain tylenol and alcohol level    #Hypothyroidism  - Out patient diagnosis    Plan:  - obtain TSH  - c/w levothyroxine 75mcg PO QD    #Hypertension and Dyslipidemia  - c/w amlodipine 5mg PO QD  - c/w Lopressor 25mg PO BID  - c/w simvastatin 20mg PO QHS    #COPD  - No evidence of acute exacerbation on exam.    Plan:  - c/w Spiriva 1 puff QD (therapeutic interchange for Incruse Ellipta)    #Depression  - c/w duloxetine DR 60mg PO QD    DVT PPX: heparin 5000U SQ Q12H  GI PPX: none indicated  DIET: DASH/TLC  ACTIVITY: IAT  CODE STATUS: Full Code (f/u GOC with son)  DISPOSITION: From Home     ASSESSMENT & PLAN:  89F w/ h/o HTN, DLD, COPD, hypothyroidism, iron deficiency anemia, bladder cancer (not on active treatment), RCC (s/p left nephrectomy), and lung cancer (s/p left lobectomy) p/w 4 days of progressively worsening abdominal pain a/w severe diarrhea. Admission labs concerning for acute transaminitis, MANA, and acute on chronic anemia.     #Urinary Tract Infection  - Initially p/w suprapubic abdominal pain. Recently rx'd Bactrim x5 days. However, likely mis-dosed as pt's baseline CrCl ~15, yet prescribed Bactrim DS BID (per UpToDate, renal dosing at this CrCl should be SS BID or even QD). UA concerning for UTI (positive nitrite, large LE, , and moderate bacteria). No evidence of sepsis present on admission.     Plan:  - c/w ceftriaxone 1g IV QD  - f/u UCx final report  - f/u Infectious disease recommendations     #Acute Kidney Injury  -Admission labs significant for Cr 3.5 (previously Cr 2.2 in Apr 2022), concerning for MANA. Suspect pre-renal azotemia vs ATN secondary to renal ischemia (BP 98/52 on admission) in setting of hypovolemia due to excessive diarrhea. However, may also be related to recent Bactrim use as TMP is known to decrease tubular secretion of creatinine. Should also suspect RTA, type IV which can be a/w Bactrim use, but lower suspicion as K only 5.2 and slight hemolyzed (should suspect if K>5.5 on repeat in AM).    Plan:  - trend Cr via daily BMP/CMP  - f/u urine lytes to calculate FeNa/FeUrea (may be inaccurate in setting of NS bolus given in ED)  - start gentle hydration (after obtaining lytes) w/ LR @ 75 cc/hr  - Renal consult     #Acute on Chronic Anemia  - Admission labs significant Hb 7.0 (baseline Hb 7-9 per HIE). "Black stools" reported on admission, but JOSE R negative. S/P 1u pRBC total this admission.    Plan:  - Trend Hb via daily CBC; transfuse if Hb<7  - Maintain active T&S   - obtain reticulocyte count (evaluate bone marrow response)  - obtain haptoglobin and LDH (r/o hemolysis as Bactrim can be a/w hemolytic anemia)  - limited utility in iron studies/ferritin given recent transfusion    #Diarrhea, resolved  - Initially p/w severe diarrhea that has since resolved. Suspect antibiotic-associated diarrhea in setting of recent Bactrim use.     Plan;  - f/u stool culture  - consider C diff if diarrhea returns and >3 episodes in 24 hrs    #Acute Transaminitis  - Drug-Induced Liver Injury, suspected  -Admission labs significant for acute transaminitis (AST/ALT 1694/802). Liver enzymes WNL in April 2022. R factor 11.4 suggests hepatocellular injury. CTAP demonstrates intrahepatic and extrahepatic ductal dilatation, but no masses noted. Overall concerning for drug-induced liver injury in setting of recent Bactrim use. Low suspicion for DRESS given lack of eosinophilia. Low suspicion for acute cholecystitis given h/o CCY.     Plan:  - trend AST/ALT via daily CMP  - obtain PT/PTT (evaluate liver synthetic function)  - obtain US RUQ w/ Doppler (r/o portal vein thrombosis given h/o multiple cancers)  - obtain acute hepatitis panel (r/o acute viral hepatitis)  - obtain autoimmune markers (CARLITO, SMA, anti-LKM1; r/o autoimmune hepatitis)  - obtain tylenol and alcohol level  - Advance GI consult - f/u  - possible  MRCP  - Hepatology consult- f/u    #Hypothyroidism  - Out patient diagnosis    Plan:  - obtain TSH  - c/w levothyroxine 75mcg PO QD    #Hypertension and Dyslipidemia  - c/w amlodipine 5mg PO QD  - c/w Lopressor 25mg PO BID  - c/w simvastatin 20mg PO QHS    #COPD  - No evidence of acute exacerbation on exam.    Plan:  - c/w Spiriva 1 puff QD (therapeutic interchange for Incruse Ellipta)    #Depression  - c/w duloxetine DR 60mg PO QD    DVT PPX: heparin 5000U SQ Q12H  GI PPX: none indicated  DIET: DASH/TLC  ACTIVITY: IAT  CODE STATUS: Full Code (f/u GOC with son)  DISPOSITION: From Home

## 2022-07-29 ENCOUNTER — TRANSCRIPTION ENCOUNTER (OUTPATIENT)
Age: 87
End: 2022-07-29

## 2022-07-29 LAB
ANION GAP SERPL CALC-SCNC: 9 MMOL/L — SIGNIFICANT CHANGE UP (ref 7–14)
BUN SERPL-MCNC: 49 MG/DL — HIGH (ref 10–20)
CALCIUM SERPL-MCNC: 9.5 MG/DL — SIGNIFICANT CHANGE UP (ref 8.5–10.1)
CHLORIDE SERPL-SCNC: 109 MMOL/L — SIGNIFICANT CHANGE UP (ref 98–110)
CO2 SERPL-SCNC: 16 MMOL/L — LOW (ref 17–32)
CREAT SERPL-MCNC: 2.9 MG/DL — HIGH (ref 0.7–1.5)
EGFR: 14 ML/MIN/1.73M2 — LOW
GLUCOSE SERPL-MCNC: 118 MG/DL — HIGH (ref 70–99)
HAPTOGLOB SERPL-MCNC: <20 MG/DL — LOW (ref 34–200)
HAV IGM SER-ACNC: SIGNIFICANT CHANGE UP
HBV CORE IGM SER-ACNC: SIGNIFICANT CHANGE UP
HBV SURFACE AG SER-ACNC: SIGNIFICANT CHANGE UP
HCV AB S/CO SERPL IA: 0.14 S/CO — SIGNIFICANT CHANGE UP (ref 0–0.99)
HCV AB SERPL-IMP: SIGNIFICANT CHANGE UP
POTASSIUM SERPL-MCNC: 4.9 MMOL/L — SIGNIFICANT CHANGE UP (ref 3.5–5)
POTASSIUM SERPL-SCNC: 4.9 MMOL/L — SIGNIFICANT CHANGE UP (ref 3.5–5)
SMOOTH MUSCLE AB SER-ACNC: SIGNIFICANT CHANGE UP
SODIUM SERPL-SCNC: 134 MMOL/L — LOW (ref 135–146)

## 2022-07-29 PROCEDURE — 99233 SBSQ HOSP IP/OBS HIGH 50: CPT

## 2022-07-29 RX ORDER — SODIUM BICARBONATE 1 MEQ/ML
1300 SYRINGE (ML) INTRAVENOUS
Refills: 0 | Status: DISCONTINUED | OUTPATIENT
Start: 2022-07-29 | End: 2022-07-31

## 2022-07-29 RX ORDER — LABETALOL HCL 100 MG
100 TABLET ORAL ONCE
Refills: 0 | Status: COMPLETED | OUTPATIENT
Start: 2022-07-29 | End: 2022-07-30

## 2022-07-29 RX ORDER — SODIUM CHLORIDE 9 MG/ML
1000 INJECTION, SOLUTION INTRAVENOUS
Refills: 0 | Status: DISCONTINUED | OUTPATIENT
Start: 2022-07-29 | End: 2022-07-31

## 2022-07-29 RX ORDER — AMLODIPINE BESYLATE 2.5 MG/1
5 TABLET ORAL ONCE
Refills: 0 | Status: COMPLETED | OUTPATIENT
Start: 2022-07-29 | End: 2022-07-29

## 2022-07-29 RX ADMIN — HEPARIN SODIUM 5000 UNIT(S): 5000 INJECTION INTRAVENOUS; SUBCUTANEOUS at 18:03

## 2022-07-29 RX ADMIN — Medication 25 MILLIGRAM(S): at 18:03

## 2022-07-29 RX ADMIN — SODIUM CHLORIDE 100 MILLILITER(S): 9 INJECTION, SOLUTION INTRAVENOUS at 16:23

## 2022-07-29 RX ADMIN — AMLODIPINE BESYLATE 5 MILLIGRAM(S): 2.5 TABLET ORAL at 05:37

## 2022-07-29 RX ADMIN — Medication 75 MICROGRAM(S): at 05:37

## 2022-07-29 RX ADMIN — DULOXETINE HYDROCHLORIDE 60 MILLIGRAM(S): 30 CAPSULE, DELAYED RELEASE ORAL at 12:05

## 2022-07-29 RX ADMIN — SODIUM CHLORIDE 100 MILLILITER(S): 9 INJECTION, SOLUTION INTRAVENOUS at 22:54

## 2022-07-29 RX ADMIN — HEPARIN SODIUM 5000 UNIT(S): 5000 INJECTION INTRAVENOUS; SUBCUTANEOUS at 05:36

## 2022-07-29 RX ADMIN — Medication 1300 MILLIGRAM(S): at 15:14

## 2022-07-29 RX ADMIN — CEFTRIAXONE 100 MILLIGRAM(S): 500 INJECTION, POWDER, FOR SOLUTION INTRAMUSCULAR; INTRAVENOUS at 12:08

## 2022-07-29 RX ADMIN — Medication 25 MILLIGRAM(S): at 05:36

## 2022-07-29 RX ADMIN — AMLODIPINE BESYLATE 5 MILLIGRAM(S): 2.5 TABLET ORAL at 19:21

## 2022-07-29 NOTE — PROGRESS NOTE ADULT - ASSESSMENT
89F w/ h/o HTN, DLD, COPD, hypothyroidism, iron deficiency anemia, bladder cancer (not on active treatment), RCC (s/p left nephrectomy - in remission), and lung cancer (s/p left lobectomy - in remission) p/w 4 days of progressively worsening abdominal pain a/w severe diarrhea. Admission labs concerning for acute transaminitis, MANA, and acute on chronic anemia.         # Urinary Tract Infection; no sepsis  Recently rx'd Bactrim x5 days  UA +  ceftriaxone 1g IV QD  f/u UCx--shows E coli-- no sensitivity yet    # Acute Kidney Injury; CKD 4 w/ solitary Kidney ; s/p Lt nephrectomy  IVFs: ringer's [id number]/hr  seen by renal-- fluid changed-- her BP very high in am--restarted home meds  CT A/P: no hydro on rt; non-obst renal calc on rt; not much seen in terms of bladder cancer or mets    # Acute Transaminitis - 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; s/p CCY; intra/extra hep duct dil - NOT new (CBD 15mm in 2019 and now 17mm)  acute transaminitis (AST/ALT 1694/802) -now 354/502  CT A/P demonstrates intrahepatic and extrahepatic ductal dilatation, but no masses noted.  RUQ U/S: no thrombosis  acute hepatitis panel normal  obtain autoimmune markers (CARLITO, SMA, anti-LKM1; r/o autoimmune hepatitis)-pending  tylenol and alcohol levels are nl      # Acute on Chronic macro Anemia  Hb 7.0 (baseline Hb 7-9 per HIE)  JOSE R negative  S/P 1u pRBC   hb is now 8    # Diarrhea, resolved    # Hypothyroidism  obtain TSH  c/w levothyroxine 75mcg PO QD    # Hypertension  c/w amlodipine 5mg PO QD-- can increase to 10mg as per nephrology  c/w Lopressor 25mg PO BID    # Dyslipidemia  hold statin until LFTs better    # COPD - stable  c/w Spiriva 1 puff QD (therapeutic interchange for Incruse Ellipta)    # Depression  c/w duloxetine DR 60mg PO QD    # DVT PPX: heparin 5000U SQ Q12H     anemia w/u; CEA lvl; f/u cx's; c/w abx;   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 Admission labs concerning for acute transaminitis, MANA, and acute on chronic anemia.         # Urinary Tract Infection; no sepsis  Recently rx'd Bactrim x5 days  UA +  ceftriaxone 1g IV QD  f/u UCx--shows E coli-- no sensitivity yet    # Acute Kidney Injury; CKD 4 w/ solitary Kidney ; s/p Lt nephrectomy  IVFs: ringer's [id number]/hr-- baseline around 2  seen by renal-- fluid changed-- her BP very high in am--restarted home meds  CT A/P: no hydro on rt; non-obst renal calc on rt; not much seen in terms of bladder cancer or mets    # Acute Transaminitis - 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; s/p CCY; intra/extra hep duct dil - NOT new (CBD 15mm in 2019 and now 17mm)  acute transaminitis (AST/ALT 1694/802) -now 354/502  CT A/P demonstrates intrahepatic and extrahepatic ductal dilatation, but no masses noted.  RUQ U/S: no thrombosis  acute hepatitis panel normal  obtain autoimmune markers (CARLITO, SMA, anti-LKM1; r/o autoimmune hepatitis)-pending  tylenol and alcohol levels are nl      # Acute on Chronic macro Anemia  Hb 7.0 (baseline Hb 7-9 per HIE)  JOSE R negative  S/P 1u pRBC   hb is now 8    # Diarrhea, resolved    # Hypothyroidism  obtain TSH  c/w levothyroxine 75mcg PO QD    # Hypertension  c/w amlodipine 5mg PO QD-- can increase to 10mg as per nephrology  c/w Lopressor 25mg PO BID    # Dyslipidemia  hold statin until LFTs better    # COPD - stable  c/w Spiriva 1 puff QD (therapeutic interchange for Incruse Ellipta)    # Depression  c/w duloxetine DR 60mg PO QD    # DVT PPX: heparin 5000U SQ Q12H     anemia w/u; CEA lvl; f/u cx's; c/w abx; renal function  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

## 2022-07-29 NOTE — PROGRESS NOTE ADULT - ASSESSMENT
No difficulties Pt with PMH of Bladder Ca, Lt nephrectomy, Lung Ca, HTN, admitted with diarrhea, found to have GRACY.    Gracy on CKD 4 - baseline creat 2 .0 mg%  due to volume depletion likely prerenal  hypotensive on admission now hypertensive   CTAP no hydro on the right kidney, multiple nonobstructing calculi, 1 cm old lesion, likely hemorrhagic cyst  switch IVF to LR at 100 cc per hour   repeat BMP please   Met acidosis cont  na bicarb 1300 mg bid  Hyponatremia - resolved   encourage solute intake if not NPO     Diarrhea w/u/ follow LFTs    BP noted if remains high increase amlodipine to 10 mg q24h    will follow .

## 2022-07-29 NOTE — PHYSICAL THERAPY INITIAL EVALUATION ADULT - GENERAL OBSERVATIONS, REHAB EVAL
89 years old female with h/o HTN, HLD, OA, fibromyalgia, h/o CVA ( 2016 and 2020), s/p bilateral hip replacement present to ED with complain of slurry speech since waking today AM. Last known normalwas 12:30AM going to sleep.  Hypertensive in ED, sat well at [facility]. EKG with NSR, 1st degree AV block, QTc 429. No leukocytosis, Hb 12.4, Plt 266, K 3.7, Cr 1.01, hstnt 25.3.       1. CVA  h/o CVA x 2, 2016 and 2020 with mild residual weakness, ambulate with rolling walker normally  CT head with remote lacunar infarct. New ischemia cannot be excluded. CT perfusion abnormal.  MRI  Tiny 2 mm focus of restricted diffusion posterior limb of the RIGHT internal capsule consistent with an acute lacunar infarction. Mild periventricular and moderate deep and subcortical white matter ischemia is noted. Tiny old lacunar infarctions are seen in the RIGHT corona radiata, BILATERAL thalami and posterior RIGHT periventricular white matter as well as the BILATERAL cerebellum.  C/w aspirin, start plavix given multiple CVA's and high intensity statin  f/u ECHO  c/w tele monitoring  Permissive HTN  Neuro check  f/u Lipid panel, A1c  PT/OT/Speech evaluation  Neurology eval.  cardiology eval for possible LYDIA/ loop recorder.    2. HTN  Permissive HTN for acute CVA.    3. Hyperlipidemia  c/w atorvastatin.       PT IE [id number]. Chart reviewed. Pt encountered semi-reclined in bed. In NAD. + IV lock, + primafit. pt willing to participate in PT session.

## 2022-07-29 NOTE — PROGRESS NOTE ADULT - SUBJECTIVE AND OBJECTIVE BOX
Nephrology progress note    THIS IS AN INCOMPLETE NOTE . FULL NOTE TO FOLLOW SHORTLY    Patient is seen and examined, events over the last 24 h noted .    Allergies:  Contrast Dye (Hives; Rash)  sulfa drugs (Urticaria)    Hospital Medications:   MEDICATIONS  (STANDING):  amLODIPine   Tablet 5 milliGRAM(s) Oral daily  cefTRIAXone   IVPB 1000 milliGRAM(s) IV Intermittent every 24 hours  DULoxetine 60 milliGRAM(s) Oral daily  heparin   Injectable 5000 Unit(s) SubCutaneous every 12 hours  levothyroxine 75 MICROGram(s) Oral daily  metoprolol tartrate 25 milliGRAM(s) Oral two times a day  sodium chloride 0.9%. 1000 milliLiter(s) (100 mL/Hr) IV Continuous <Continuous>  tiotropium 18 MICROgram(s) Capsule 1 Capsule(s) Inhalation daily        VITALS:  T(F): 96.8 (22 @ 04:40), Max: 99 (22 @ 20:45)  HR: 65 (22 @ 07:29)  BP: 190/81 (22 @ 07:29)  RR: 18 (22 @ 04:40)  SpO2: 99% (22 @ 07:34)  Wt(kg): --        PHYSICAL EXAM:  Constitutional: NAD  HEENT: anicteric sclera, oropharynx clear, MMM  Neck: No JVD  Respiratory: CTAB, no wheezes, rales or rhonchi  Cardiovascular: S1, S2, RRR  Gastrointestinal: BS+, soft, NT/ND  Extremities: No cyanosis or clubbing. No peripheral edema  :  No enrique.   Skin: No rashes    LABS:      137  |  110  |  53<H>  ----------------------------<  141<H>  4.7   |  16<L>  |  3.3<H>  Creatinine Trend: 3.3<--, 3.5<--  Ca    9.0      2022 09:34  Phos  3.6       Mg     1.9         TPro  5.8<L>  /  Alb  3.5  /  TBili  0.8  /  DBili      /  AST  354<H>  /  ALT  502<H>  /  AlkPhos  204<H>                            8.0    7.14  )-----------( 104      ( 2022 09:34 )             25.2       Urine Studies:  Urinalysis Basic - ( 2022 13:25 )    Color: Orange / Appearance: Clear / S.025 / pH:   Gluc:  / Ketone: Negative  / Bili: Large / Urobili: <2 mg/dL   Blood:  / Protein: 300 mg/dL / Nitrite: Positive   Leuk Esterase: Large / RBC: 32 /HPF /  /HPF   Sq Epi:  / Non Sq Epi: 8 /HPF / Bacteria: Moderate      Osmolality, Random Urine: 408 mos/kg ( @ 12:58)  Sodium, Random Urine: 60.0 mmoL/L ( @ 12:58)  Creatinine, Random Urine: 84 mg/dL ( @ 12:58)  Protein/Creatinine Ratio Calculation: 1.5 Ratio ( @ 12:58)      Iron 52, TIBC 246, %sat 21      [21 @ 11:00]  Ferritin 857      [21 @ 11:00]  HbA1c 5.3      [19 @ 12:21]    HBsAg Nonreact      [22 @ 09:34]  HCV 0.14, Nonreact      [22 @ 09:34]        RADIOLOGY & ADDITIONAL STUDIES:   Nephrology progress note  Patient is seen and examined, events over the last 24 h noted .  Lying in bed comfortable  said she was feeling better     Allergies:  Contrast Dye (Hives; Rash)  sulfa drugs (Urticaria)    Hospital Medications:   MEDICATIONS  (STANDING):  amLODIPine   Tablet 5 milliGRAM(s) Oral daily  cefTRIAXone   IVPB 1000 milliGRAM(s) IV Intermittent every 24 hours  DULoxetine 60 milliGRAM(s) Oral daily  heparin   Injectable 5000 Unit(s) SubCutaneous every 12 hours  levothyroxine 75 MICROGram(s) Oral daily  metoprolol tartrate 25 milliGRAM(s) Oral two times a day  sodium chloride 0.9%. 1000 milliLiter(s) (100 mL/Hr) IV Continuous <Continuous>  tiotropium 18 MICROgram(s) Capsule 1 Capsule(s) Inhalation daily        VITALS:  T(F): 96.8 (22 @ 04:40), Max: 99 (22 @ 20:45)  HR: 65 (22 @ 07:29)  BP: 190/81 (22 @ 07:29)  RR: 18 (22 @ 04:40)  SpO2: 99% (22 @ 07:34)          PHYSICAL EXAM:  Constitutional: NAD  Neck: No JVD  Respiratory: CTAB,  Cardiovascular: S1, S2, RRR  Gastrointestinal: BS+, soft, NT/ND  Extremities: No cyanosis or clubbing. No peripheral edema  :  No enrique.   Skin: No rashes    LABS:          137  |  110  |  53<H>  ----------------------------<  141<H>  4.7   |  16<L>  |  3.3<H>    Creatinine Trend: 3.3<--, 3.5<--  Ca    9.0      2022 09:34  Phos  3.6       Mg     1.9         TPro  5.8<L>  /  Alb  3.5  /  TBili  0.8  /  DBili      /  AST  354<H>  /  ALT  502<H>  /  AlkPhos  204<H>                            8.0    7.14  )-----------( 104      ( 2022 09:34 )             25.2       Urine Studies:  Urinalysis Basic - ( 2022 13:25 )    Color: Orange / Appearance: Clear / S.025 / pH:   Gluc:  / Ketone: Negative  / Bili: Large / Urobili: <2 mg/dL   Blood:  / Protein: 300 mg/dL / Nitrite: Positive   Leuk Esterase: Large / RBC: 32 /HPF /  /HPF   Sq Epi:  / Non Sq Epi: 8 /HPF / Bacteria: Moderate      Osmolality, Random Urine: 408 mos/kg ( @ 12:58)  Sodium, Random Urine: 60.0 mmoL/L ( @ 12:58)  Creatinine, Random Urine: 84 mg/dL ( @ 12:58)  Protein/Creatinine Ratio Calculation: 1.5 Ratio ( @ 12:58)      Iron 52, TIBC 246, %sat 21      [21 @ 11:00]  Ferritin 857      [21 @ 11:00]  HbA1c 5.3      [19 @ 12:21]    HBsAg Nonreact      [22 @ 09:34]  HCV 0.14, Nonreact      [22 @ 09:34]        RADIOLOGY & ADDITIONAL STUDIES:

## 2022-07-29 NOTE — PROGRESS NOTE ADULT - SUBJECTIVE AND OBJECTIVE BOX
DRAKE QUIÑONES  89y  Female      Patient is a 89y old  Female who presents with a chief complaint of Abdominal Pain (29 Jul 2022 12:42)      INTERVAL HPI/OVERNIGHT EVENT:  Had an episode of dizziness and high BP in the morning after her PT - 190mmhg systolic   Rechecked - 145/81mmhg  no other complaints     REVIEW OF SYSTEMS:  CONSTITUTIONAL: No fever, weight loss, or fatigue  RESPIRATORY: No cough, wheezing, chills or hemoptysis; No shortness of breath  CARDIOVASCULAR: No chest pain, palpitations, dizziness, or leg swelling  GASTROINTESTINAL: No abdominal or epigastric pain. No nausea, vomiting, or hematemesis; No diarrhea or constipation. No melena or hematochezia.  GENITOURINARY: No dysuria, frequency, hematuria, or incontinence  NEUROLOGICAL: No headaches, memory loss, loss of strength, numbness, or tremors  SKIN: No itching, burning, rashes, or lesions   MUSCULOSKELETAL: No joint pain or swelling; No muscle, back, or extremity pain    FAMILY HISTORY:  Family history of cancer in sister (Sibling)      T(C): 36.2 (07-29-22 @ 13:54), Max: 37.2 (07-28-22 @ 20:45)  HR: 62 (07-29-22 @ 13:54) (62 - 79)  BP: 128/63 (07-29-22 @ 13:54) (128/63 - 190/81)  RR: 18 (07-29-22 @ 13:54) (18 - 18)  SpO2: 99% (07-29-22 @ 07:34) (99% - 99%)  Wt(kg): --Vital Signs Last 24 Hrs  T(C): 36.2 (29 Jul 2022 13:54), Max: 37.2 (28 Jul 2022 20:45)  T(F): 97.1 (29 Jul 2022 13:54), Max: 99 (28 Jul 2022 20:45)  HR: 62 (29 Jul 2022 13:54) (62 - 79)  BP: 128/63 (29 Jul 2022 13:54) (128/63 - 190/81)  BP(mean): --  RR: 18 (29 Jul 2022 13:54) (18 - 18)  SpO2: 99% (29 Jul 2022 07:34) (99% - 99%)    Parameters below as of 29 Jul 2022 07:34  Patient On (Oxygen Delivery Method): room air        PHYSICAL EXAM:  GENERAL: NAD, well-groomed, well-developed  HEAD:  Atraumatic, Normocephalic  NERVOUS SYSTEM:  Alert & Oriented X3, Good concentration; Motor Strength 5/5 B/L upper and lower extremities;   CHEST/LUNG: Clear to percussion bilaterally; No rales, rhonchi, wheezing, or rubs  HEART: Regular rate and rhythm; No murmurs, rubs, or gallops  ABDOMEN: Soft, Nontender, Nondistended; Bowel sounds present  EXTREMITIES:  2+ Peripheral Pulses, No clubbing, cyanosis, or edema  SKIN: No rashes or lesions    Consultant(s) Notes Reviewed:  [x ] YES  [ ] NO  Care Discussed with Consultants/Other Providers [ x] YES  [ ] NO    .  LABS:                         8.0    7.14  )-----------( 104      ( 28 Jul 2022 09:34 )             25.2     07-28    137  |  110  |  53<H>  ----------------------------<  141<H>  4.7   |  16<L>  |  3.3<H>    Ca    9.0      28 Jul 2022 09:34  Phos  3.6     07-28  Mg     1.9     07-28    TPro  5.8<L>  /  Alb  3.5  /  TBili  0.8  /  DBili  x   /  AST  354<H>  /  ALT  502<H>  /  AlkPhos  204<H>  07-28    PT/INR - ( 28 Jul 2022 09:34 )   PT: 12.30 sec;   INR: 1.07 ratio         PTT - ( 28 Jul 2022 09:34 )  PTT:33.9 sec        RADIOLOGY, EKG & ADDITIONAL TESTS: Reviewed.       Imaging Personally Reviewed:  [ ] YES  [ ] NO  amLODIPine   Tablet 5 milliGRAM(s) Oral daily  cefTRIAXone   IVPB 1000 milliGRAM(s) IV Intermittent every 24 hours  DULoxetine 60 milliGRAM(s) Oral daily  heparin   Injectable 5000 Unit(s) SubCutaneous every 12 hours  lactated ringers. 1000 milliLiter(s) IV Continuous <Continuous>  levothyroxine 75 MICROGram(s) Oral daily  metoprolol tartrate 25 milliGRAM(s) Oral two times a day  sodium bicarbonate 1300 milliGRAM(s) Oral two times a day  tiotropium 18 MICROgram(s) Capsule 1 Capsule(s) Inhalation daily      HEALTH ISSUES - PROBLEM Dx:    # Urinary Tract Infection; no sepsis  Recently rx'd Bactrim x5 days  UA +  ceftriaxone 1g IV QD - continue till   f/u UCx--shows E coli-- no sensitivity yet  - awaiting for her MANA to settle     # Acute Kidney Injury; CKD 4 w/ solitary Kidney ; s/p Lt nephrectomy  IVFs: ringer's [id number]/hr-- baseline around 2  seen by renal-- fluid changed-- her BP very high in am--restarted home meds  CT A/P: no hydro on rt; non-obst renal calc on rt; not much seen in terms of bladder cancer or mets    # Acute Transaminitis - 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;   #intra/extra hep duct dil - NOT new   - s/p CCY;  (CBD 15mm in 2019 and now 17mm) - will be followed by GI as outpatient with MRCP   - transaminitis resolving     # Acute on Chronic macro Anemia  Hb 7.0 (baseline Hb 7-9 per HIE)  JOSE R negative  S/P 1u pRBC   hb is now 8    # Diarrhea, resolved    # Hypothyroidism  obtain TSH  c/w levothyroxine 75mcg PO QD    # Hypertension  c/w amlodipine 5mg PO QD-- can increase to 10mg as per nephrology  c/w Lopressor 25mg PO BID    # Dyslipidemia  hold statin until LFTs better    # COPD - stable  c/w Spiriva 1 puff QD (therapeutic interchange for Incruse Ellipta)    # Depression  c/w duloxetine DR 60mg PO QD    # DVT PPX: heparin 5000U SQ Q12H       DRAKE QUIÑONES  89y  Female      Patient is a 89y old  Female who presents with a chief complaint of Abdominal Pain (29 Jul 2022 12:42)      INTERVAL HPI/OVERNIGHT EVENT:  Had an episode of dizziness and high BP in the morning after her PT - 190mmhg systolic   Rechecked - 145/81mmhg  no other complaints     REVIEW OF SYSTEMS:  CONSTITUTIONAL: No fever, weight loss, or fatigue  RESPIRATORY: No cough, wheezing, chills or hemoptysis; No shortness of breath  CARDIOVASCULAR: No chest pain, palpitations, dizziness, or leg swelling  GASTROINTESTINAL: No abdominal or epigastric pain. No nausea, vomiting, or hematemesis; No diarrhea or constipation. No melena or hematochezia.  GENITOURINARY: No dysuria, frequency, hematuria, or incontinence  NEUROLOGICAL: No headaches, memory loss, loss of strength, numbness, or tremors  SKIN: No itching, burning, rashes, or lesions   MUSCULOSKELETAL: No joint pain or swelling; No muscle, back, or extremity pain    FAMILY HISTORY:  Family history of cancer in sister (Sibling)      T(C): 36.2 (07-29-22 @ 13:54), Max: 37.2 (07-28-22 @ 20:45)  HR: 62 (07-29-22 @ 13:54) (62 - 79)  BP: 128/63 (07-29-22 @ 13:54) (128/63 - 190/81)  RR: 18 (07-29-22 @ 13:54) (18 - 18)  SpO2: 99% (07-29-22 @ 07:34) (99% - 99%)  Wt(kg): --Vital Signs Last 24 Hrs  T(C): 36.2 (29 Jul 2022 13:54), Max: 37.2 (28 Jul 2022 20:45)  T(F): 97.1 (29 Jul 2022 13:54), Max: 99 (28 Jul 2022 20:45)  HR: 62 (29 Jul 2022 13:54) (62 - 79)  BP: 128/63 (29 Jul 2022 13:54) (128/63 - 190/81)  BP(mean): --  RR: 18 (29 Jul 2022 13:54) (18 - 18)  SpO2: 99% (29 Jul 2022 07:34) (99% - 99%)    Parameters below as of 29 Jul 2022 07:34  Patient On (Oxygen Delivery Method): room air        PHYSICAL EXAM:  GENERAL: NAD, well-groomed, well-developed  HEAD:  Atraumatic, Normocephalic  NERVOUS SYSTEM:  Alert & Oriented X3, Good concentration; Motor Strength 5/5 B/L upper and lower extremities;   CHEST/LUNG: Clear to percussion bilaterally; No rales, rhonchi, wheezing, or rubs  HEART: Regular rate and rhythm; No murmurs, rubs, or gallops  ABDOMEN: Soft, Nontender, Nondistended; Bowel sounds present  EXTREMITIES:  2+ Peripheral Pulses, No clubbing, cyanosis, or edema  SKIN: No rashes or lesions    Consultant(s) Notes Reviewed:  [x ] YES  [ ] NO  Care Discussed with Consultants/Other Providers [ x] YES  [ ] NO    .  LABS:                         8.0    7.14  )-----------( 104      ( 28 Jul 2022 09:34 )             25.2     07-28    137  |  110  |  53<H>  ----------------------------<  141<H>  4.7   |  16<L>  |  3.3<H>    Ca    9.0      28 Jul 2022 09:34  Phos  3.6     07-28  Mg     1.9     07-28    TPro  5.8<L>  /  Alb  3.5  /  TBili  0.8  /  DBili  x   /  AST  354<H>  /  ALT  502<H>  /  AlkPhos  204<H>  07-28    PT/INR - ( 28 Jul 2022 09:34 )   PT: 12.30 sec;   INR: 1.07 ratio         PTT - ( 28 Jul 2022 09:34 )  PTT:33.9 sec        RADIOLOGY, EKG & ADDITIONAL TESTS: Reviewed.       Imaging Personally Reviewed:  [ ] YES  [ ] NO  amLODIPine   Tablet 5 milliGRAM(s) Oral daily  cefTRIAXone   IVPB 1000 milliGRAM(s) IV Intermittent every 24 hours  DULoxetine 60 milliGRAM(s) Oral daily  heparin   Injectable 5000 Unit(s) SubCutaneous every 12 hours  lactated ringers. 1000 milliLiter(s) IV Continuous <Continuous>  levothyroxine 75 MICROGram(s) Oral daily  metoprolol tartrate 25 milliGRAM(s) Oral two times a day  sodium bicarbonate 1300 milliGRAM(s) Oral two times a day  tiotropium 18 MICROgram(s) Capsule 1 Capsule(s) Inhalation daily      HEALTH ISSUES - PROBLEM Dx:    # Urinary Tract Infection; no sepsis  - Recently rx'd Bactrim x5 days  - UA +  - ceftriaxone 1g IV QD - continue till   - f/u UCx--shows E coli-- no sensitivity yet  - awaiting for her MANA to settle   - Patient in acidosis - was given a bicarbonate dose   - repeat BMP - watch for bicarb value     # Acute Kidney Injury; CKD 4 w/ solitary Kidney ; s/p Lt nephrectomy  IVFs: ringer's [id number]/hr-- baseline around 2  seen by renal-- fluid changed-- her BP very high in am--restarted home meds  CT A/P: no hydro on rt; non-obst renal calc on rt; not much seen in terms of bladder cancer or mets    # Acute Transaminitis - 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;   #intra/extra hep duct dil - NOT new   - s/p CCY;  (CBD 15mm in 2019 and now 17mm) - will be followed by GI as outpatient with MRCP   - transaminitis resolving     # Acute on Chronic macro Anemia  Hb 7.0 (baseline Hb 7-9 per HIE)  JOSE R negative  S/P 1u pRBC   hb is now 8    # Diarrhea, resolved    # Hypothyroidism  obtain TSH  c/w levothyroxine 75mcg PO QD    # Hypertension  c/w amlodipine 5mg PO QD-- can increase to 10mg as per nephrology  c/w Lopressor 25mg PO BID    # Dyslipidemia  hold statin until LFTs better    # COPD - stable  c/w Spiriva 1 puff QD (therapeutic interchange for Incruse Ellipta)    # Depression  c/w duloxetine DR 60mg PO QD    # DVT PPX: heparin 5000U SQ Q12H

## 2022-07-29 NOTE — PHYSICAL THERAPY INITIAL EVALUATION ADULT - PERTINENT HX OF CURRENT PROBLEM, REHAB EVAL
89F w/ h/o HTN, DLD, COPD, hypothyroidism, iron deficiency anemia, bladder cancer (not on active treatment), RCC (s/p left nephrectomy), and lung cancer (s/p left lobectomy) p/w 4 days of progressively worsening abdominal pain a/w severe diarrhea.

## 2022-07-30 LAB
-  AMIKACIN: SIGNIFICANT CHANGE UP
-  AMOXICILLIN/CLAVULANIC ACID: SIGNIFICANT CHANGE UP
-  AMPICILLIN/SULBACTAM: SIGNIFICANT CHANGE UP
-  AMPICILLIN: SIGNIFICANT CHANGE UP
-  AZTREONAM: SIGNIFICANT CHANGE UP
-  CEFAZOLIN: SIGNIFICANT CHANGE UP
-  CEFEPIME: SIGNIFICANT CHANGE UP
-  CEFOXITIN: SIGNIFICANT CHANGE UP
-  CEFTRIAXONE: SIGNIFICANT CHANGE UP
-  CIPROFLOXACIN: SIGNIFICANT CHANGE UP
-  ERTAPENEM: SIGNIFICANT CHANGE UP
-  GENTAMICIN: SIGNIFICANT CHANGE UP
-  IMIPENEM: SIGNIFICANT CHANGE UP
-  LEVOFLOXACIN: SIGNIFICANT CHANGE UP
-  MEROPENEM: SIGNIFICANT CHANGE UP
-  NITROFURANTOIN: SIGNIFICANT CHANGE UP
-  PIPERACILLIN/TAZOBACTAM: SIGNIFICANT CHANGE UP
-  TIGECYCLINE: SIGNIFICANT CHANGE UP
-  TOBRAMYCIN: SIGNIFICANT CHANGE UP
-  TRIMETHOPRIM/SULFAMETHOXAZOLE: SIGNIFICANT CHANGE UP
ALBUMIN SERPL ELPH-MCNC: 3.2 G/DL — LOW (ref 3.5–5.2)
ALP SERPL-CCNC: 145 U/L — HIGH (ref 30–115)
ALT FLD-CCNC: 189 U/L — HIGH (ref 0–41)
ANION GAP SERPL CALC-SCNC: 10 MMOL/L — SIGNIFICANT CHANGE UP (ref 7–14)
ANION GAP SERPL CALC-SCNC: 11 MMOL/L — SIGNIFICANT CHANGE UP (ref 7–14)
AST SERPL-CCNC: 39 U/L — SIGNIFICANT CHANGE UP (ref 0–41)
BASOPHILS # BLD AUTO: 0.02 K/UL — SIGNIFICANT CHANGE UP (ref 0–0.2)
BASOPHILS NFR BLD AUTO: 0.3 % — SIGNIFICANT CHANGE UP (ref 0–1)
BILIRUB SERPL-MCNC: 0.4 MG/DL — SIGNIFICANT CHANGE UP (ref 0.2–1.2)
BLD GP AB SCN SERPL QL: SIGNIFICANT CHANGE UP
BUN SERPL-MCNC: 45 MG/DL — HIGH (ref 10–20)
BUN SERPL-MCNC: 46 MG/DL — HIGH (ref 10–20)
CALCIUM SERPL-MCNC: 9 MG/DL — SIGNIFICANT CHANGE UP (ref 8.5–10.1)
CALCIUM SERPL-MCNC: 9.2 MG/DL — SIGNIFICANT CHANGE UP (ref 8.5–10.1)
CEA SERPL-MCNC: 3.5 NG/ML — SIGNIFICANT CHANGE UP (ref 0–3.8)
CHLORIDE SERPL-SCNC: 108 MMOL/L — SIGNIFICANT CHANGE UP (ref 98–110)
CHLORIDE SERPL-SCNC: 112 MMOL/L — HIGH (ref 98–110)
CO2 SERPL-SCNC: 16 MMOL/L — LOW (ref 17–32)
CO2 SERPL-SCNC: 18 MMOL/L — SIGNIFICANT CHANGE UP (ref 17–32)
CREAT SERPL-MCNC: 2.6 MG/DL — HIGH (ref 0.7–1.5)
CREAT SERPL-MCNC: 2.6 MG/DL — HIGH (ref 0.7–1.5)
CULTURE RESULTS: SIGNIFICANT CHANGE UP
EGFR: 16 ML/MIN/1.73M2 — LOW
EGFR: 16 ML/MIN/1.73M2 — LOW
EOSINOPHIL # BLD AUTO: 0.17 K/UL — SIGNIFICANT CHANGE UP (ref 0–0.7)
EOSINOPHIL NFR BLD AUTO: 2.8 % — SIGNIFICANT CHANGE UP (ref 0–8)
GLUCOSE SERPL-MCNC: 114 MG/DL — HIGH (ref 70–99)
GLUCOSE SERPL-MCNC: 126 MG/DL — HIGH (ref 70–99)
HCT VFR BLD CALC: 21.8 % — LOW (ref 37–47)
HGB BLD-MCNC: 7.1 G/DL — LOW (ref 12–16)
IMM GRANULOCYTES NFR BLD AUTO: 0.7 % — HIGH (ref 0.1–0.3)
LKM AB SER-ACNC: <20.1 UNITS — SIGNIFICANT CHANGE UP (ref 0–20)
LYMPHOCYTES # BLD AUTO: 0.96 K/UL — LOW (ref 1.2–3.4)
LYMPHOCYTES # BLD AUTO: 16 % — LOW (ref 20.5–51.1)
MAGNESIUM SERPL-MCNC: 1.6 MG/DL — LOW (ref 1.8–2.4)
MCHC RBC-ENTMCNC: 32.1 PG — HIGH (ref 27–31)
MCHC RBC-ENTMCNC: 32.6 G/DL — SIGNIFICANT CHANGE UP (ref 32–37)
MCV RBC AUTO: 98.6 FL — SIGNIFICANT CHANGE UP (ref 81–99)
METHOD TYPE: SIGNIFICANT CHANGE UP
MONOCYTES # BLD AUTO: 0.63 K/UL — HIGH (ref 0.1–0.6)
MONOCYTES NFR BLD AUTO: 10.5 % — HIGH (ref 1.7–9.3)
NEUTROPHILS # BLD AUTO: 4.19 K/UL — SIGNIFICANT CHANGE UP (ref 1.4–6.5)
NEUTROPHILS NFR BLD AUTO: 69.7 % — SIGNIFICANT CHANGE UP (ref 42.2–75.2)
NRBC # BLD: 0 /100 WBCS — SIGNIFICANT CHANGE UP (ref 0–0)
ORGANISM # SPEC MICROSCOPIC CNT: SIGNIFICANT CHANGE UP
ORGANISM # SPEC MICROSCOPIC CNT: SIGNIFICANT CHANGE UP
PLATELET # BLD AUTO: 130 K/UL — SIGNIFICANT CHANGE UP (ref 130–400)
POTASSIUM SERPL-MCNC: 4.5 MMOL/L — SIGNIFICANT CHANGE UP (ref 3.5–5)
POTASSIUM SERPL-MCNC: 4.6 MMOL/L — SIGNIFICANT CHANGE UP (ref 3.5–5)
POTASSIUM SERPL-SCNC: 4.5 MMOL/L — SIGNIFICANT CHANGE UP (ref 3.5–5)
POTASSIUM SERPL-SCNC: 4.6 MMOL/L — SIGNIFICANT CHANGE UP (ref 3.5–5)
PROT SERPL-MCNC: 5.4 G/DL — LOW (ref 6–8)
RBC # BLD: 2.21 M/UL — LOW (ref 4.2–5.4)
RBC # FLD: 16.1 % — HIGH (ref 11.5–14.5)
SODIUM SERPL-SCNC: 136 MMOL/L — SIGNIFICANT CHANGE UP (ref 135–146)
SODIUM SERPL-SCNC: 139 MMOL/L — SIGNIFICANT CHANGE UP (ref 135–146)
SPECIMEN SOURCE: SIGNIFICANT CHANGE UP
TSH SERPL-MCNC: 2.26 UIU/ML — SIGNIFICANT CHANGE UP (ref 0.27–4.2)
WBC # BLD: 6.01 K/UL — SIGNIFICANT CHANGE UP (ref 4.8–10.8)
WBC # FLD AUTO: 6.01 K/UL — SIGNIFICANT CHANGE UP (ref 4.8–10.8)

## 2022-07-30 PROCEDURE — 99233 SBSQ HOSP IP/OBS HIGH 50: CPT

## 2022-07-30 RX ADMIN — SODIUM CHLORIDE 100 MILLILITER(S): 9 INJECTION, SOLUTION INTRAVENOUS at 10:43

## 2022-07-30 RX ADMIN — CEFTRIAXONE 100 MILLIGRAM(S): 500 INJECTION, POWDER, FOR SOLUTION INTRAMUSCULAR; INTRAVENOUS at 11:37

## 2022-07-30 RX ADMIN — Medication 25 MILLIGRAM(S): at 17:30

## 2022-07-30 RX ADMIN — Medication 100 MILLIGRAM(S): at 03:00

## 2022-07-30 RX ADMIN — DULOXETINE HYDROCHLORIDE 60 MILLIGRAM(S): 30 CAPSULE, DELAYED RELEASE ORAL at 11:08

## 2022-07-30 RX ADMIN — AMLODIPINE BESYLATE 5 MILLIGRAM(S): 2.5 TABLET ORAL at 10:40

## 2022-07-30 RX ADMIN — HEPARIN SODIUM 5000 UNIT(S): 5000 INJECTION INTRAVENOUS; SUBCUTANEOUS at 06:05

## 2022-07-30 RX ADMIN — Medication 1300 MILLIGRAM(S): at 17:30

## 2022-07-30 RX ADMIN — Medication 75 MICROGRAM(S): at 06:05

## 2022-07-30 RX ADMIN — SODIUM CHLORIDE 100 MILLILITER(S): 9 INJECTION, SOLUTION INTRAVENOUS at 17:29

## 2022-07-30 RX ADMIN — Medication 25 MILLIGRAM(S): at 10:40

## 2022-07-30 RX ADMIN — HEPARIN SODIUM 5000 UNIT(S): 5000 INJECTION INTRAVENOUS; SUBCUTANEOUS at 17:29

## 2022-07-30 RX ADMIN — Medication 1300 MILLIGRAM(S): at 06:05

## 2022-07-30 NOTE — PROGRESS NOTE ADULT - ASSESSMENT
Pt with PMH of Bladder Ca, Lt nephrectomy, Lung Ca, HTN, admitted with diarrhea, found to have GRACY.    Gracy on CKD 4 - baseline creat 2 .0 mg%  due to volume depletion likely prerenal  hypotensive on admission now hypertensive   CTAP no hydro on the right kidney, multiple nonobstructing calculi, 1 cm old lesion, likely hemorrhagic cyst  cont LR at 100 cc per hour   repeat BMP in AM / creat better   Met acidosis cont  Na bicarb 1300 mg bid  Hyponatremia - resolved   encourage solute intake if not NPO     Diarrhea w/u/ follow LFTs    BP noted if remains high increase amlodipine to 10 mg q24h    will follow .

## 2022-07-30 NOTE — PROGRESS NOTE ADULT - ASSESSMENT
Pt with PMH of Bladder Ca, Lt nephrectomy, Lung Ca, HTN, admitted with diarrhea, found to have Acute Kidney Injury.    A/P:   Abdominal Pain:   UTI:   Diarrhea: improved.   UA is positive.   Urine cx is growing E.Coli.   Continue Rocephin.     Acute Kidney Injury; CKD 4 w/ solitary Kidney ; s/p Lt nephrectomy  Metabolic acidosis: normal anion gap, HCO3: 16 low.   likely pre-renal, UA   Cr is improving 2.6 from 3.5, baseline 2.2  Abdomen CT showed no hydronephrosis, non obstructive renal stone on right.   Decrease IV fluid.   continue NaHCO3 until improving acidosis.   Nephrology follow up.     Acute Anemia on chronic macrocytic Anemia:   Hb was 7.1 s/p 1 packed RBC transfusion.   Hb dropped again   Reported dark stool but taking iron supplement, no evidence of melena or hematochezia so far, continue to monitor.     Hypertension  Continue amlodipine 5mg PO QD, can increase to 10mg if she persists to have high blood pressures.  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   #intra/extra hep duct dil - NOT new   - s/p CCY;  (CBD 15mm in 2019 and now 17mm) - will be followed by GI as outpatient with MRCP   caution while on Rocephin.       Diarrhea, resolved    Hypothyroidism  TSH 2.3 normal.   c/w levothyroxine 75mcg PO QD    COPD - stable  c/w Spiriva 1 puff QD (therapeutic interchange for Incruse Ellipta)    # Depression  c/w duloxetine DR 60mg PO QD    # DVT PPX: heparin 5000U SQ Q12H  #Progress Note Handoff:  Pending (specify):  improving anemia, Acute Kidney Injury,   Family discussion:  Disposition: Home.

## 2022-07-30 NOTE — PROGRESS NOTE ADULT - SUBJECTIVE AND OBJECTIVE BOX
Nephrology progress note    THIS IS AN INCOMPLETE NOTE . FULL NOTE TO FOLLOW SHORTLY    Patient is seen and examined, events over the last 24 h noted .    Allergies:  Contrast Dye (Hives; Rash)  sulfa drugs (Urticaria)    Hospital Medications:   MEDICATIONS  (STANDING):  amLODIPine   Tablet 5 milliGRAM(s) Oral daily  cefTRIAXone   IVPB 1000 milliGRAM(s) IV Intermittent every 24 hours  DULoxetine 60 milliGRAM(s) Oral daily  heparin   Injectable 5000 Unit(s) SubCutaneous every 12 hours  lactated ringers. 1000 milliLiter(s) (100 mL/Hr) IV Continuous <Continuous>  levothyroxine 75 MICROGram(s) Oral daily  metoprolol tartrate 25 milliGRAM(s) Oral two times a day  sodium bicarbonate 1300 milliGRAM(s) Oral two times a day  tiotropium 18 MICROgram(s) Capsule 1 Capsule(s) Inhalation daily        VITALS:  T(F): 97.1 (22 @ 05:19), Max: 97.1 (22 @ 13:54)  HR: 86 (22 @ 08:18)  BP: 106/59 (22 @ 05:19)  RR: 18 (22 @ 05:19)  SpO2: 100% (22 @ 08:18)  Wt(kg): --     @ 07:01  -   @ 07:00  --------------------------------------------------------  IN: 0 mL / OUT: 1203 mL / NET: -1203 mL          PHYSICAL EXAM:  Constitutional: NAD  HEENT: anicteric sclera, oropharynx clear, MMM  Neck: No JVD  Respiratory: CTAB, no wheezes, rales or rhonchi  Cardiovascular: S1, S2, RRR  Gastrointestinal: BS+, soft, NT/ND  Extremities: No cyanosis or clubbing. No peripheral edema  :  No enrique.   Skin: No rashes    LABS:      139  |  112<H>  |  46<H>  ----------------------------<  126<H>  4.5   |  16<L>  |  2.6<H>    Ca    9.0      2022 01:03  Phos  3.6       Mg     1.9         TPro  5.8<L>  /  Alb  3.5  /  TBili  0.8  /  DBili      /  AST  354<H>  /  ALT  502<H>  /  AlkPhos  204<H>                            7.1    6.01  )-----------( 130      ( 2022 07:45 )             21.8       Urine Studies:  Urinalysis Basic - ( 2022 13:25 )    Color: Orange / Appearance: Clear / S.025 / pH:   Gluc:  / Ketone: Negative  / Bili: Large / Urobili: <2 mg/dL   Blood:  / Protein: 300 mg/dL / Nitrite: Positive   Leuk Esterase: Large / RBC: 32 /HPF /  /HPF   Sq Epi:  / Non Sq Epi: 8 /HPF / Bacteria: Moderate      Osmolality, Random Urine: 408 mos/kg ( @ 12:58)  Sodium, Random Urine: 60.0 mmoL/L ( @ 12:58)  Creatinine, Random Urine: 84 mg/dL ( @ 12:58)  Protein/Creatinine Ratio Calculation: 1.5 Ratio ( @ 12:58)      Iron 52, TIBC 246, %sat 21      [21 @ 11:00]  Ferritin 857      [21 @ 11:00]  HbA1c 5.3      [19 @ 12:21]    HBsAg Nonreact      [22 @ 09:34]  HCV 0.14, Nonreact      [22 @ 09:34]        RADIOLOGY & ADDITIONAL STUDIES:   Nephrology progress note  Patient is seen and examined, events over the last 24 h noted .  Lying in bed comfortable     Allergies:  Contrast Dye (Hives; Rash)  sulfa drugs (Urticaria)    Hospital Medications:   MEDICATIONS  (STANDING):  amLODIPine   Tablet 5 milliGRAM(s) Oral daily  cefTRIAXone   IVPB 1000 milliGRAM(s) IV Intermittent every 24 hours  DULoxetine 60 milliGRAM(s) Oral daily  heparin   Injectable 5000 Unit(s) SubCutaneous every 12 hours  lactated ringers. 1000 milliLiter(s) (100 mL/Hr) IV Continuous <Continuous>  levothyroxine 75 MICROGram(s) Oral daily  metoprolol tartrate 25 milliGRAM(s) Oral two times a day  sodium bicarbonate 1300 milliGRAM(s) Oral two times a day  tiotropium 18 MICROgram(s) Capsule 1 Capsule(s) Inhalation daily        VITALS:  T(F): 97.1 (22 @ 05:19), Max: 97.1 (22 @ 13:54)  HR: 86 (22 @ 08:18)  BP: 106/59 (22 @ 05:19)  RR: 18 (22 @ 05:19)  SpO2: 100% (22 @ 08:18)       @ 07:01  -   @ 07:00  --------------------------------------------------------  IN: 0 mL / OUT: 1203 mL / NET: -1203 mL          PHYSICAL EXAM:  Constitutional: NAD  Respiratory: CTAB,   Cardiovascular: S1, S2, RRR  Gastrointestinal: BS+, soft, NT/ND  Extremities: No cyanosis or clubbing. No peripheral edema  Skin: No rashes    LABS:      139  |  112<H>  |  46<H>  ----------------------------<  126<H>  4.5   |  16<L>  |  2.6<H>    Creatinine Trend: 2.6<--, 2.6<--, 2.9<--, 3.3<--, 3.5<--    Ca    9.0      2022 01:03  Phos  3.6       Mg     1.9         TPro  5.8<L>  /  Alb  3.5  /  TBili  0.8  /  DBili      /  AST  354<H>  /  ALT  502<H>  /  AlkPhos  204<H>                            7.1    6.01  )-----------( 130      ( 2022 07:45 )             21.8       Urine Studies:  Urinalysis Basic - ( 2022 13:25 )    Color: Orange / Appearance: Clear / S.025 / pH:   Gluc:  / Ketone: Negative  / Bili: Large / Urobili: <2 mg/dL   Blood:  / Protein: 300 mg/dL / Nitrite: Positive   Leuk Esterase: Large / RBC: 32 /HPF /  /HPF   Sq Epi:  / Non Sq Epi: 8 /HPF / Bacteria: Moderate      Osmolality, Random Urine: 408 mos/kg ( @ 12:58)  Sodium, Random Urine: 60.0 mmoL/L ( @ 12:58)  Creatinine, Random Urine: 84 mg/dL ( @ 12:58)  Protein/Creatinine Ratio Calculation: 1.5 Ratio ( @ 12:58)      Iron 52, TIBC 246, %sat 21      [21 @ 11:00]  Ferritin 857      [21 @ 11:00]  HbA1c 5.3      [19 @ 12:21]    HBsAg Nonreact      [22 @ 09:34]  HCV 0.14, Nonreact      [22 @ 09:34]        RADIOLOGY & ADDITIONAL STUDIES:

## 2022-07-30 NOTE — PROGRESS NOTE ADULT - SUBJECTIVE AND OBJECTIVE BOX
DRAKE QUIÑONES  89y  Female      Patient is a 89y old  Female who presents with a chief complaint of Abdominal Pain (30 Jul 2022 09:09)      INTERVAL HPI/OVERNIGHT EVENTS:  Patient had episodes of high blood pressure which required a stat dose of labetalol overnight. She expressed worry about this.     REVIEW OF SYSTEMS:  CONSTITUTIONAL: No fever, weight loss, or fatigue  RESPIRATORY: No cough, wheezing, chills or hemoptysis; No shortness of breath  CARDIOVASCULAR: No chest pain, palpitations, dizziness, or leg swelling  GASTROINTESTINAL: No abdominal or epigastric pain. No nausea, vomiting, or hematemesis; No diarrhea or constipation. No melena or hematochezia.  GENITOURINARY: No dysuria, frequency, hematuria, or incontinence  NEUROLOGICAL: No headaches, memory loss, loss of strength, numbness, or tremors  SKIN: No itching, burning, rashes, or lesions     FAMILY HISTORY:  Family history of cancer in sister (Sibling)      T(C): 36.2 (07-30-22 @ 05:19), Max: 36.2 (07-29-22 @ 13:54)  HR: 65 (07-30-22 @ 10:38) (58 - 86)  BP: 139/63 (07-30-22 @ 10:38) (106/59 - 199/83)  RR: 18 (07-30-22 @ 05:19) (18 - 18)  SpO2: 100% (07-30-22 @ 08:18) (95% - 100%)  Wt(kg): --Vital Signs Last 24 Hrs  T(C): 36.2 (30 Jul 2022 05:19), Max: 36.2 (29 Jul 2022 13:54)  T(F): 97.1 (30 Jul 2022 05:19), Max: 97.1 (29 Jul 2022 13:54)  HR: 65 (30 Jul 2022 10:38) (58 - 86)  BP: 139/63 (30 Jul 2022 10:38) (106/59 - 199/83)  BP(mean): --  RR: 18 (30 Jul 2022 05:19) (18 - 18)  SpO2: 100% (30 Jul 2022 08:18) (95% - 100%)    Parameters below as of 30 Jul 2022 08:18  Patient On (Oxygen Delivery Method): room air      PHYSICAL EXAM:  GENERAL: NAD, well-groomed, well-developed  HEAD:  Atraumatic, Normocephalic  NERVOUS SYSTEM:  Alert & Oriented X3, Good concentration; Motor Strength 5/5 B/L upper and lower extremities;   CHEST/LUNG: Clear to percussion bilaterally; No rales, rhonchi, wheezing, or rubs  HEART: Regular rate and rhythm; No murmurs, rubs, or gallops  ABDOMEN: Soft, Nontender, Nondistended; Bowel sounds present  EXTREMITIES:  2+ Peripheral Pulses, No clubbing, cyanosis, or edema  LYMPH: No lymphadenopathy noted  SKIN: No rashes or lesions    Consultant(s) Notes Reviewed:  [x ] YES  [ ] NO  Care Discussed with Consultants/Other Providers [ x] YES  [ ] NO    .  LABS:                         7.1    6.01  )-----------( 130      ( 30 Jul 2022 07:45 )             21.8     07-30    136  |  108  |  45<H>  ----------------------------<  114<H>  4.6   |  18  |  2.6<H>    Ca    9.2      30 Jul 2022 07:45  Mg     1.6     07-30    TPro  5.4<L>  /  Alb  3.2<L>  /  TBili  0.4  /  DBili  x   /  AST  39  /  ALT  189<H>  /  AlkPhos  145<H>  07-30      RADIOLOGY, EKG & ADDITIONAL TESTS: Reviewed.         Imaging Personally Reviewed:  [ ] YES  [ ] NO  amLODIPine   Tablet 5 milliGRAM(s) Oral daily  DULoxetine 60 milliGRAM(s) Oral daily  heparin   Injectable 5000 Unit(s) SubCutaneous every 12 hours  lactated ringers. 1000 milliLiter(s) IV Continuous <Continuous>  levothyroxine 75 MICROGram(s) Oral daily  metoprolol tartrate 25 milliGRAM(s) Oral two times a day  sodium bicarbonate 1300 milliGRAM(s) Oral two times a day  tiotropium 18 MICROgram(s) Capsule 1 Capsule(s) Inhalation daily      HEALTH ISSUES - PROBLEM Dx:      # Urinary Tract Infection; no sepsis  - UA +  - ceftriaxone 1g IV QD - continue till sensitivity is back and make changes as needed   - f/u UCx--shows E coli-- no sensitivity yet  - Recently rx'd Bactrim x5 days - following which she developed the diarrhea and   - awaiting for her MANA to settle   - Patient in acidosis - was given a bicarbonate dose   - repeat BMP - watch for bicarb value     # Acute Kidney Injury; CKD 4 w/ solitary Kidney ; s/p Lt nephrectomy  IVFs: ringer's [id number]/hr-- baseline around 2  seen by renal-- fluid changed-- her BP very high in am--restarted home meds  CT A/P: no hydro on rt; non-obst renal calc on rt; not much seen in terms of bladder cancer or mets    # Hypertension  c/w amlodipine 5mg PO QD-- can increase to 10mg if she persists to have high blood pressures.  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;   #intra/extra hep duct dil - NOT new   - s/p CCY;  (CBD 15mm in 2019 and now 17mm) - will be followed by GI as outpatient with MRCP   - transaminitis resolving     # Acute on Chronic macro Anemia  Hb is now 7.1 - follow repeat value and transfuse if she's actively bleeding or has a <7  Hb 7.0 (baseline Hb 7-9 per HIE)  JOSE R negative  S/P 1u pRBC     # Diarrhea, resolved    # Hypothyroidism  obtain TSH  c/w levothyroxine 75mcg PO QD    # Dyslipidemia  hold statin until LFTs better    # COPD - stable  c/w Spiriva 1 puff QD (therapeutic interchange for Incruse Ellipta)    # Depression  c/w duloxetine DR 60mg PO QD    # DVT PPX: heparin 5000U SQ Q12H

## 2022-07-31 LAB
ALBUMIN SERPL ELPH-MCNC: 3.2 G/DL — LOW (ref 3.5–5.2)
ALP SERPL-CCNC: 143 U/L — HIGH (ref 30–115)
ALT FLD-CCNC: 124 U/L — HIGH (ref 0–41)
ANION GAP SERPL CALC-SCNC: 9 MMOL/L — SIGNIFICANT CHANGE UP (ref 7–14)
AST SERPL-CCNC: 22 U/L — SIGNIFICANT CHANGE UP (ref 0–41)
BASOPHILS # BLD AUTO: 0.04 K/UL — SIGNIFICANT CHANGE UP (ref 0–0.2)
BASOPHILS NFR BLD AUTO: 0.7 % — SIGNIFICANT CHANGE UP (ref 0–1)
BILIRUB SERPL-MCNC: 0.4 MG/DL — SIGNIFICANT CHANGE UP (ref 0.2–1.2)
BUN SERPL-MCNC: 39 MG/DL — HIGH (ref 10–20)
CALCIUM SERPL-MCNC: 9.1 MG/DL — SIGNIFICANT CHANGE UP (ref 8.5–10.1)
CHLORIDE SERPL-SCNC: 111 MMOL/L — HIGH (ref 98–110)
CO2 SERPL-SCNC: 20 MMOL/L — SIGNIFICANT CHANGE UP (ref 17–32)
CREAT SERPL-MCNC: 2.2 MG/DL — HIGH (ref 0.7–1.5)
EGFR: 20 ML/MIN/1.73M2 — LOW
EOSINOPHIL # BLD AUTO: 0.23 K/UL — SIGNIFICANT CHANGE UP (ref 0–0.7)
EOSINOPHIL NFR BLD AUTO: 4.2 % — SIGNIFICANT CHANGE UP (ref 0–8)
GLUCOSE SERPL-MCNC: 96 MG/DL — SIGNIFICANT CHANGE UP (ref 70–99)
HCT VFR BLD CALC: 22.1 % — LOW (ref 37–47)
HGB BLD-MCNC: 7.1 G/DL — LOW (ref 12–16)
IMM GRANULOCYTES NFR BLD AUTO: 0.5 % — HIGH (ref 0.1–0.3)
LYMPHOCYTES # BLD AUTO: 0.87 K/UL — LOW (ref 1.2–3.4)
LYMPHOCYTES # BLD AUTO: 15.7 % — LOW (ref 20.5–51.1)
MAGNESIUM SERPL-MCNC: 1.6 MG/DL — LOW (ref 1.8–2.4)
MCHC RBC-ENTMCNC: 31.4 PG — HIGH (ref 27–31)
MCHC RBC-ENTMCNC: 32.1 G/DL — SIGNIFICANT CHANGE UP (ref 32–37)
MCV RBC AUTO: 97.8 FL — SIGNIFICANT CHANGE UP (ref 81–99)
MONOCYTES # BLD AUTO: 0.65 K/UL — HIGH (ref 0.1–0.6)
MONOCYTES NFR BLD AUTO: 11.8 % — HIGH (ref 1.7–9.3)
NEUTROPHILS # BLD AUTO: 3.71 K/UL — SIGNIFICANT CHANGE UP (ref 1.4–6.5)
NEUTROPHILS NFR BLD AUTO: 67.1 % — SIGNIFICANT CHANGE UP (ref 42.2–75.2)
NRBC # BLD: 0 /100 WBCS — SIGNIFICANT CHANGE UP (ref 0–0)
PLATELET # BLD AUTO: 137 K/UL — SIGNIFICANT CHANGE UP (ref 130–400)
POTASSIUM SERPL-MCNC: 4.9 MMOL/L — SIGNIFICANT CHANGE UP (ref 3.5–5)
POTASSIUM SERPL-SCNC: 4.9 MMOL/L — SIGNIFICANT CHANGE UP (ref 3.5–5)
PROT SERPL-MCNC: 5.3 G/DL — LOW (ref 6–8)
RBC # BLD: 2.26 M/UL — LOW (ref 4.2–5.4)
RBC # FLD: 16 % — HIGH (ref 11.5–14.5)
SODIUM SERPL-SCNC: 140 MMOL/L — SIGNIFICANT CHANGE UP (ref 135–146)
WBC # BLD: 5.53 K/UL — SIGNIFICANT CHANGE UP (ref 4.8–10.8)
WBC # FLD AUTO: 5.53 K/UL — SIGNIFICANT CHANGE UP (ref 4.8–10.8)

## 2022-07-31 PROCEDURE — 99232 SBSQ HOSP IP/OBS MODERATE 35: CPT

## 2022-07-31 RX ORDER — AMLODIPINE BESYLATE 2.5 MG/1
10 TABLET ORAL DAILY
Refills: 0 | Status: DISCONTINUED | OUTPATIENT
Start: 2022-08-01 | End: 2022-08-03

## 2022-07-31 RX ORDER — AMLODIPINE BESYLATE 2.5 MG/1
5 TABLET ORAL ONCE
Refills: 0 | Status: COMPLETED | OUTPATIENT
Start: 2022-07-31 | End: 2022-07-31

## 2022-07-31 RX ADMIN — Medication 25 MILLIGRAM(S): at 17:07

## 2022-07-31 RX ADMIN — SODIUM CHLORIDE 100 MILLILITER(S): 9 INJECTION, SOLUTION INTRAVENOUS at 02:56

## 2022-07-31 RX ADMIN — TIOTROPIUM BROMIDE 1 CAPSULE(S): 18 CAPSULE ORAL; RESPIRATORY (INHALATION) at 09:13

## 2022-07-31 RX ADMIN — HEPARIN SODIUM 5000 UNIT(S): 5000 INJECTION INTRAVENOUS; SUBCUTANEOUS at 05:44

## 2022-07-31 RX ADMIN — HEPARIN SODIUM 5000 UNIT(S): 5000 INJECTION INTRAVENOUS; SUBCUTANEOUS at 17:07

## 2022-07-31 RX ADMIN — AMLODIPINE BESYLATE 5 MILLIGRAM(S): 2.5 TABLET ORAL at 05:44

## 2022-07-31 RX ADMIN — AMLODIPINE BESYLATE 5 MILLIGRAM(S): 2.5 TABLET ORAL at 21:08

## 2022-07-31 RX ADMIN — DULOXETINE HYDROCHLORIDE 60 MILLIGRAM(S): 30 CAPSULE, DELAYED RELEASE ORAL at 11:40

## 2022-07-31 RX ADMIN — Medication 1300 MILLIGRAM(S): at 05:44

## 2022-07-31 RX ADMIN — Medication 75 MICROGRAM(S): at 05:44

## 2022-07-31 RX ADMIN — Medication 25 MILLIGRAM(S): at 05:44

## 2022-07-31 NOTE — PROGRESS NOTE ADULT - SUBJECTIVE AND OBJECTIVE BOX
Nephrology progress note    THIS IS AN INCOMPLETE NOTE . FULL NOTE TO FOLLOW SHORTLY    Patient is seen and examined, events over the last 24 h noted .    Allergies:  Contrast Dye (Hives; Rash)  sulfa drugs (Urticaria)    Hospital Medications:   MEDICATIONS  (STANDING):  amLODIPine   Tablet 5 milliGRAM(s) Oral daily  DULoxetine 60 milliGRAM(s) Oral daily  heparin   Injectable 5000 Unit(s) SubCutaneous every 12 hours  lactated ringers. 1000 milliLiter(s) (100 mL/Hr) IV Continuous <Continuous>  levothyroxine 75 MICROGram(s) Oral daily  metoprolol tartrate 25 milliGRAM(s) Oral two times a day  sodium bicarbonate 1300 milliGRAM(s) Oral two times a day  tiotropium 18 MICROgram(s) Capsule 1 Capsule(s) Inhalation daily        VITALS:  T(F): 96.4 (22 @ 04:42), Max: 98.1 (22 @ 19:08)  HR: 56 (22 @ 08:27)  BP: 174/74 (22 @ 04:42)  RR: 18 (22 @ 04:42)  SpO2: 92% (22 @ 08:27)  Wt(kg): --     @ 07:  -   @ 07:00  --------------------------------------------------------  IN: 0 mL / OUT: 1203 mL / NET: -1203 mL     @ 07:  -   @ 07:00  --------------------------------------------------------  IN: 1300 mL / OUT: 0 mL / NET: 1300 mL          PHYSICAL EXAM:  Constitutional: NAD  HEENT: anicteric sclera, oropharynx clear, MMM  Neck: No JVD  Respiratory: CTAB, no wheezes, rales or rhonchi  Cardiovascular: S1, S2, RRR  Gastrointestinal: BS+, soft, NT/ND  Extremities: No cyanosis or clubbing. No peripheral edema  :  No enrique.   Skin: No rashes    LABS:      136  |  108  |  45<H>  ----------------------------<  114<H>  4.6   |  18  |  2.6<H>    Ca    9.2      2022 07:45  Mg     1.6         TPro  5.4<L>  /  Alb  3.2<L>  /  TBili  0.4  /  DBili      /  AST  39  /  ALT  189<H>  /  AlkPhos  145<H>                            7.1    6.01  )-----------( 130      ( 2022 07:45 )             21.8       Urine Studies:  Urinalysis Basic - ( 2022 13:25 )    Color: Orange / Appearance: Clear / S.025 / pH:   Gluc:  / Ketone: Negative  / Bili: Large / Urobili: <2 mg/dL   Blood:  / Protein: 300 mg/dL / Nitrite: Positive   Leuk Esterase: Large / RBC: 32 /HPF /  /HPF   Sq Epi:  / Non Sq Epi: 8 /HPF / Bacteria: Moderate      Osmolality, Random Urine: 408 mos/kg ( @ 12:58)  Sodium, Random Urine: 60.0 mmoL/L ( @ 12:58)  Creatinine, Random Urine: 84 mg/dL ( @ 12:58)  Protein/Creatinine Ratio Calculation: 1.5 Ratio ( @ 12:58)      Iron 52, TIBC 246, %sat 21      [21 @ 11:00]  Ferritin 857      [21 @ 11:00]  HbA1c 5.3      [19 @ 12:21]  TSH 2.26      [22 @ 07:45]    HBsAg Nonreact      [22 @ 09:34]  HCV 0.14, Nonreact      [22 @ 09:34]        RADIOLOGY & ADDITIONAL STUDIES:   Nephrology progress note  Patient is seen and examined, events over the last 24 h noted .  Lying in bed comfortable   Allergies:  Contrast Dye (Hives; Rash)  sulfa drugs (Urticaria)    Hospital Medications:   MEDICATIONS  (STANDING):  amLODIPine   Tablet 5 milliGRAM(s) Oral daily  DULoxetine 60 milliGRAM(s) Oral daily  heparin   Injectable 5000 Unit(s) SubCutaneous every 12 hours  lactated ringers. 1000 milliLiter(s) (100 mL/Hr) IV Continuous <Continuous>  levothyroxine 75 MICROGram(s) Oral daily  metoprolol tartrate 25 milliGRAM(s) Oral two times a day  sodium bicarbonate 1300 milliGRAM(s) Oral two times a day  tiotropium 18 MICROgram(s) Capsule 1 Capsule(s) Inhalation daily        VITALS:  T(F): 96.4 (22 @ 04:42), Max: 98.1 (22 @ 19:08)  HR: 56 (22 @ 08:27)  BP: 174/74 (22 @ 04:42)  RR: 18 (22 @ 04:42)  SpO2: 92% (22 @ 08:27)       @ 07:01  -   @ 07:00  --------------------------------------------------------  IN: 0 mL / OUT: 1203 mL / NET: -1203 mL     @ 07:  -   @ 07:00  --------------------------------------------------------  IN: 1300 mL / OUT: 0 mL / NET: 1300 mL          PHYSICAL EXAM:  Constitutional: NAD  Neck: No JVD  Respiratory: CTAB,   Cardiovascular: S1, S2, RRR  Gastrointestinal: BS+, soft, NT/ND  Extremities: No cyanosis or clubbing. No peripheral edema  :  No enrique.   Skin: No rashes    LABS:      140  |  111<H>  |  39<H>  ----------------------------<  96  4.9   |  20  |  2.2<H>    Ca    9.1      2022 08:41  Mg     1.6         TPro  5.3<L>  /  Alb  3.2<L>  /  TBili  0.4  /  DBili  x   /  AST  22  /  ALT  124<H>  /  AlkPhos  143<H>      136  |  108  |  45<H>  ----------------------------<  114<H>  4.6   |  18  |  2.6<H>    Ca    9.2      2022 07:45  Mg     1.6         TPro  5.4<L>  /  Alb  3.2<L>  /  TBili  0.4  /  DBili      /  AST  39  /  ALT  189<H>  /  AlkPhos  145<H>                            7.1    6.01  )-----------( 130      ( 2022 07:45 )             21.8       Urine Studies:  Urinalysis Basic - ( 2022 13:25 )    Color: Orange / Appearance: Clear / S.025 / pH:   Gluc:  / Ketone: Negative  / Bili: Large / Urobili: <2 mg/dL   Blood:  / Protein: 300 mg/dL / Nitrite: Positive   Leuk Esterase: Large / RBC: 32 /HPF /  /HPF   Sq Epi:  / Non Sq Epi: 8 /HPF / Bacteria: Moderate      Osmolality, Random Urine: 408 mos/kg ( @ 12:58)  Sodium, Random Urine: 60.0 mmoL/L ( @ 12:58)  Creatinine, Random Urine: 84 mg/dL ( @ 12:58)  Protein/Creatinine Ratio Calculation: 1.5 Ratio ( @ 12:58)      Iron 52, TIBC 246, %sat 21      [21 @ 11:00]  Ferritin 857      [21 @ 11:00]  HbA1c 5.3      [19 @ 12:21]  TSH 2.26      [22 @ 07:45]    HBsAg Nonreact      [22 @ 09:34]  HCV 0.14, Nonreact      [22 @ 09:34]        RADIOLOGY & ADDITIONAL STUDIES:

## 2022-07-31 NOTE — PROGRESS NOTE ADULT - ASSESSMENT
Pt with PMH of Bladder Ca, Lt nephrectomy, Lung Ca, HTN, admitted with diarrhea, found to have GRACY.    Gracy on CKD 4 - baseline creat 2 .0 mg%  due to volume depletion likely prerenal  hypotensive on admission now hypertensive   CTAP no hydro on the right kidney, multiple nonobstructing calculi, 1 cm old lesion, likely hemorrhagic cyst  can DC IVF if tolerates po   Met acidosis cont  Na bicarb 1300 mg bid  Hyponatremia - resolved   encourage solute intake if not NPO     Diarrhea w/u/ follow LFTs    BP noted if remains high increase amlodipine to 10 mg q24h    will sign off recall PRN / call using TEAMS or on [phone] / will need oP renal follow up

## 2022-07-31 NOTE — PROGRESS NOTE ADULT - ASSESSMENT
Pt with PMH of Bladder Ca, Lt nephrectomy, Lung Ca, HTN, admitted with diarrhea, found to have Acute Kidney Injury.    A/P:   Abdominal Pain:   UTI:   Diarrhea: improved.   UA is positive.   Urine cx is growing E.Coli.   completed Rocephin.     Acute Kidney Injury; CKD 4 w/ solitary Kidney ; s/p Lt nephrectomy  Metabolic acidosis: normal anion gap, HCO3: 16 low.   likely pre-renal, UA   Cr is improving 2.2 from 3.5, baseline 2.2  Abdomen CT showed no hydronephrosis, non obstructive renal stone on right.   Discontinue IV fluid and  NaHCO3 , HCO3 20.   Nephrology follow up.     Acute Anemia on chronic macrocytic Anemia:   Hb was 7.1 s/p 1 packed RBC transfusion.   Hb dropped again to 7.1, stable today.   Reported dark stool but taking iron supplement, no evidence of melena or hematochezia so far, continue to monitor.     Hypertension:   Continue amlodipine 5mg PO QD, can increase to 10mg if she persists to have high blood pressures.  Continue Lopressor 25mg PO BID, IV fluid and NaHCO3 tab discontinued may help to control BP.     Acute Transaminitis - 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; from Bactrim.   intra/extra hep duct dil - NOT new   s/p CCY;  (CBD 15mm in 2019 and now 17mm) - will be followed by GI as outpatient with MRCP     Hypothyroidism  TSH 2.3 normal.   c/w levothyroxine 75mcg PO QD    COPD - stable  c/w Spiriva 1 puff QD (therapeutic interchange for Incruse Ellipta)    Depression: continue duloxetine DR 60mg PO QD    DVT PPX: heparin 5000U SQ Q12H  #Progress Note Handoff:  Pending (specify):   Family discussion: with her daughter Evelyn on the phone [phone], discussed, Acute Kidney Injury, High BP, disposition, patient doesn't want to go to [facility], but her family is concerning about her frequent ED visit and admission, would like more help regarding placement, for a facility vs hospice, palliative team consulted.    Disposition: Home vs SNF.

## 2022-07-31 NOTE — PROGRESS NOTE ADULT - SUBJECTIVE AND OBJECTIVE BOX
DRAKE QUIÑONES  89y  Female      Patient is a 89y old  Female who presents with a chief complaint of Abdominal Pain (31 Jul 2022 08:34)      INTERVAL HPI/OVERNIGHT EVENTS:  She feels weak, no diarrhea.   Vital Signs Last 24 Hrs  T(C): 36.8 (31 Jul 2022 14:37), Max: 36.8 (31 Jul 2022 14:37)  T(F): 98.3 (31 Jul 2022 14:37), Max: 98.3 (31 Jul 2022 14:37)  HR: 64 (31 Jul 2022 14:37) (56 - 65)  BP: 172/70 (31 Jul 2022 14:37) (127/73 - 174/74)  BP(mean): --  RR: 18 (31 Jul 2022 14:37) (18 - 18)  SpO2: 92% (31 Jul 2022 08:27) (92% - 92%)    Parameters below as of 31 Jul 2022 08:27  Patient On (Oxygen Delivery Method): room air          07-30-22 @ 07:01 - 07-31-22 @ 07:00  --------------------------------------------------------  IN: 1300 mL / OUT: 0 mL / NET: 1300 mL    07-31-22 @ 07:01  -  07-31-22 @ 14:50  --------------------------------------------------------  IN: 0 mL / OUT: 300 mL / NET: -300 mL            Consultant(s) Notes Reviewed:  [x ] YES  [ ] NO          MEDICATIONS  (STANDING):  amLODIPine   Tablet 5 milliGRAM(s) Oral daily  DULoxetine 60 milliGRAM(s) Oral daily  heparin   Injectable 5000 Unit(s) SubCutaneous every 12 hours  levothyroxine 75 MICROGram(s) Oral daily  metoprolol tartrate 25 milliGRAM(s) Oral two times a day  tiotropium 18 MICROgram(s) Capsule 1 Capsule(s) Inhalation daily    MEDICATIONS  (PRN):      LABS                          7.1    5.53  )-----------( 137      ( 31 Jul 2022 08:41 )             22.1     07-31    140  |  111<H>  |  39<H>  ----------------------------<  96  4.9   |  20  |  2.2<H>    Ca    9.1      31 Jul 2022 08:41  Mg     1.6     07-31    TPro  5.3<L>  /  Alb  3.2<L>  /  TBili  0.4  /  DBili  x   /  AST  22  /  ALT  124<H>  /  AlkPhos  143<H>  07-31          Lactate Trend  07-27 @ 07:54 Lactate:1.4         CAPILLARY BLOOD GLUCOSE          Culture - Blood (collected 07-28-22 @ 14:15)  Source: .Blood None  Preliminary Report (07-29-22 @ 23:01):    No growth to date.    Culture - Urine (collected 07-27-22 @ 13:37)  Source: Clean Catch Clean Catch (Midstream)  Final Report (07-30-22 @ 19:16):    >100,000 CFU/ml Escherichia coli  Organism: Escherichia coli (07-30-22 @ 19:16)  Organism: Escherichia coli (07-30-22 @ 19:16)      -  Amikacin: S <=16      -  Amoxicillin/Clavulanic Acid: S <=8/4 Consider reserving for cystitis when ampicillin/sulbactam is resistant      -  Ampicillin: R >16 These ampicillin results predict results for amoxicillin      -  Ampicillin/Sulbactam: R >16/8 Enterobacter, Klebsiella aerogenes, Citrobacter, and Serratia may develop resistance during prolonged therapy (3-4 days)      -  Aztreonam: S <=4      -  Cefazolin: S 4 ([email or web address]) For uncomplicated UTI with K. pneumoniae, E. coli, or P. mirablis: GEOVANNA <=16 is sensitive and GEOVANNA >=32 is resistant. This also predicts results for oral agents cefaclor, cefdinir, cefpodoxime, cefprozil, cefuroxime axetil, cephalexin and locarbef for uncomplicated UTI. Note that some isolates may be susceptible to these agents while testing resistant to cefazolin.      -  Cefepime: S <=2      -  Cefoxitin: S <=8      -  Ceftriaxone: S <=1 Enterobacter, Klebsiella aerogenes, Citrobacter, and Serratia may develop resistance during prolonged therapy      -  Ciprofloxacin: R >2      -  Ertapenem: S <=0.5      -  Gentamicin: S <=2      -  Imipenem: S <=1      -  Levofloxacin: R >4      -  Meropenem: S <=1      -  Nitrofurantoin: S <=32 Should not be used to treat pyelonephritis      -  Piperacillin/Tazobactam: S <=8      -  Tigecycline: S <=2      -  Tobramycin: S <=2      -  Trimethoprim/Sulfamethoxazole: R >2/38      Method Type: GEOVANNA        RADIOLOGY & ADDITIONAL TESTS:    Imaging Personally Reviewed:  [ ] YES  [ ] NO    HEALTH ISSUES - PROBLEM Dx:          PHYSICAL EXAM:  GENERAL: NAD, well-developed.  HEAD:  Atraumatic, Normocephalic.  EYES: EOMI, PERRLA, conjunctiva and sclera clear.  NECK: Supple, No JVD.  CHEST/LUNG: Clear to auscultation bilaterally; No wheeze.  HEART: Regular rate and rhythm; S1 S2. SM 2/6 on apex.   ABDOMEN: Soft, Nontender, Nondistended; Bowel sounds present.  EXTREMITIES:  2+ Peripheral Pulses, No clubbing, cyanosis, or edema.  PSYCH: AAOx3.  NEUROLOGY: non-focal.  SKIN: No rashes or lesions.

## 2022-08-01 ENCOUNTER — TRANSCRIPTION ENCOUNTER (OUTPATIENT)
Age: 87
End: 2022-08-01

## 2022-08-01 DIAGNOSIS — Z51.5 ENCOUNTER FOR PALLIATIVE CARE: ICD-10-CM

## 2022-08-01 DIAGNOSIS — C67.9 MALIGNANT NEOPLASM OF BLADDER, UNSPECIFIED: ICD-10-CM

## 2022-08-01 DIAGNOSIS — N18.9 CHRONIC KIDNEY DISEASE, UNSPECIFIED: ICD-10-CM

## 2022-08-01 DIAGNOSIS — D63.8 ANEMIA IN OTHER CHRONIC DISEASES CLASSIFIED ELSEWHERE: ICD-10-CM

## 2022-08-01 LAB
ALBUMIN SERPL ELPH-MCNC: 3.5 G/DL — SIGNIFICANT CHANGE UP (ref 3.5–5.2)
ALP SERPL-CCNC: 139 U/L — HIGH (ref 30–115)
ALT FLD-CCNC: 96 U/L — HIGH (ref 0–41)
ANION GAP SERPL CALC-SCNC: 12 MMOL/L — SIGNIFICANT CHANGE UP (ref 7–14)
AST SERPL-CCNC: 23 U/L — SIGNIFICANT CHANGE UP (ref 0–41)
BASOPHILS # BLD AUTO: 0.04 K/UL — SIGNIFICANT CHANGE UP (ref 0–0.2)
BASOPHILS NFR BLD AUTO: 0.5 % — SIGNIFICANT CHANGE UP (ref 0–1)
BILIRUB SERPL-MCNC: 0.5 MG/DL — SIGNIFICANT CHANGE UP (ref 0.2–1.2)
BUN SERPL-MCNC: 34 MG/DL — HIGH (ref 10–20)
CALCIUM SERPL-MCNC: 9.5 MG/DL — SIGNIFICANT CHANGE UP (ref 8.5–10.1)
CHLORIDE SERPL-SCNC: 106 MMOL/L — SIGNIFICANT CHANGE UP (ref 98–110)
CO2 SERPL-SCNC: 21 MMOL/L — SIGNIFICANT CHANGE UP (ref 17–32)
CREAT SERPL-MCNC: 2.2 MG/DL — HIGH (ref 0.7–1.5)
EGFR: 20 ML/MIN/1.73M2 — LOW
EOSINOPHIL # BLD AUTO: 0.21 K/UL — SIGNIFICANT CHANGE UP (ref 0–0.7)
EOSINOPHIL NFR BLD AUTO: 2.9 % — SIGNIFICANT CHANGE UP (ref 0–8)
GLUCOSE SERPL-MCNC: 163 MG/DL — HIGH (ref 70–99)
HCT VFR BLD CALC: 23.7 % — LOW (ref 37–47)
HGB BLD-MCNC: 7.8 G/DL — LOW (ref 12–16)
IMM GRANULOCYTES NFR BLD AUTO: 0.5 % — HIGH (ref 0.1–0.3)
LYMPHOCYTES # BLD AUTO: 1.03 K/UL — LOW (ref 1.2–3.4)
LYMPHOCYTES # BLD AUTO: 14 % — LOW (ref 20.5–51.1)
MAGNESIUM SERPL-MCNC: 1.5 MG/DL — LOW (ref 1.8–2.4)
MCHC RBC-ENTMCNC: 32 PG — HIGH (ref 27–31)
MCHC RBC-ENTMCNC: 32.9 G/DL — SIGNIFICANT CHANGE UP (ref 32–37)
MCV RBC AUTO: 97.1 FL — SIGNIFICANT CHANGE UP (ref 81–99)
MONOCYTES # BLD AUTO: 0.81 K/UL — HIGH (ref 0.1–0.6)
MONOCYTES NFR BLD AUTO: 11 % — HIGH (ref 1.7–9.3)
NEUTROPHILS # BLD AUTO: 5.23 K/UL — SIGNIFICANT CHANGE UP (ref 1.4–6.5)
NEUTROPHILS NFR BLD AUTO: 71.1 % — SIGNIFICANT CHANGE UP (ref 42.2–75.2)
NRBC # BLD: 0 /100 WBCS — SIGNIFICANT CHANGE UP (ref 0–0)
PLATELET # BLD AUTO: 164 K/UL — SIGNIFICANT CHANGE UP (ref 130–400)
POTASSIUM SERPL-MCNC: 4.7 MMOL/L — SIGNIFICANT CHANGE UP (ref 3.5–5)
POTASSIUM SERPL-SCNC: 4.7 MMOL/L — SIGNIFICANT CHANGE UP (ref 3.5–5)
PROT SERPL-MCNC: 5.9 G/DL — LOW (ref 6–8)
RBC # BLD: 2.44 M/UL — LOW (ref 4.2–5.4)
RBC # FLD: 15.8 % — HIGH (ref 11.5–14.5)
SARS-COV-2 RNA SPEC QL NAA+PROBE: SIGNIFICANT CHANGE UP
SODIUM SERPL-SCNC: 139 MMOL/L — SIGNIFICANT CHANGE UP (ref 135–146)
WBC # BLD: 7.36 K/UL — SIGNIFICANT CHANGE UP (ref 4.8–10.8)
WBC # FLD AUTO: 7.36 K/UL — SIGNIFICANT CHANGE UP (ref 4.8–10.8)

## 2022-08-01 PROCEDURE — 99498 ADVNCD CARE PLAN ADDL 30 MIN: CPT

## 2022-08-01 PROCEDURE — 99223 1ST HOSP IP/OBS HIGH 75: CPT

## 2022-08-01 PROCEDURE — 99232 SBSQ HOSP IP/OBS MODERATE 35: CPT

## 2022-08-01 PROCEDURE — 99497 ADVNCD CARE PLAN 30 MIN: CPT | Mod: 25

## 2022-08-01 RX ORDER — MAGNESIUM OXIDE 400 MG ORAL TABLET 241.3 MG
400 TABLET ORAL DAILY
Refills: 0 | Status: DISCONTINUED | OUTPATIENT
Start: 2022-08-01 | End: 2022-08-03

## 2022-08-01 RX ORDER — HYDRALAZINE HCL 50 MG
10 TABLET ORAL EVERY 8 HOURS
Refills: 0 | Status: DISCONTINUED | OUTPATIENT
Start: 2022-08-01 | End: 2022-08-02

## 2022-08-01 RX ORDER — SIMVASTATIN 20 MG/1
20 TABLET, FILM COATED ORAL AT BEDTIME
Refills: 0 | Status: DISCONTINUED | OUTPATIENT
Start: 2022-08-01 | End: 2022-08-03

## 2022-08-01 RX ORDER — AMLODIPINE BESYLATE 2.5 MG/1
1 TABLET ORAL
Qty: 0 | Refills: 0 | DISCHARGE

## 2022-08-01 RX ORDER — AMLODIPINE BESYLATE 2.5 MG/1
1 TABLET ORAL
Qty: 0 | Refills: 0 | DISCHARGE
Start: 2022-08-01

## 2022-08-01 RX ORDER — ACETAMINOPHEN 500 MG
650 TABLET ORAL EVERY 6 HOURS
Refills: 0 | Status: DISCONTINUED | OUTPATIENT
Start: 2022-08-01 | End: 2022-08-03

## 2022-08-01 RX ORDER — HYDRALAZINE HCL 50 MG
25 TABLET ORAL ONCE
Refills: 0 | Status: COMPLETED | OUTPATIENT
Start: 2022-08-01 | End: 2022-08-01

## 2022-08-01 RX ADMIN — SIMVASTATIN 20 MILLIGRAM(S): 20 TABLET, FILM COATED ORAL at 21:20

## 2022-08-01 RX ADMIN — Medication 75 MICROGRAM(S): at 05:08

## 2022-08-01 RX ADMIN — MAGNESIUM OXIDE 400 MG ORAL TABLET 400 MILLIGRAM(S): 241.3 TABLET ORAL at 15:56

## 2022-08-01 RX ADMIN — TIOTROPIUM BROMIDE 1 CAPSULE(S): 18 CAPSULE ORAL; RESPIRATORY (INHALATION) at 07:50

## 2022-08-01 RX ADMIN — Medication 650 MILLIGRAM(S): at 06:51

## 2022-08-01 RX ADMIN — Medication 10 MILLIGRAM(S): at 21:20

## 2022-08-01 RX ADMIN — AMLODIPINE BESYLATE 10 MILLIGRAM(S): 2.5 TABLET ORAL at 05:08

## 2022-08-01 RX ADMIN — Medication 25 MILLIGRAM(S): at 17:56

## 2022-08-01 RX ADMIN — HEPARIN SODIUM 5000 UNIT(S): 5000 INJECTION INTRAVENOUS; SUBCUTANEOUS at 18:04

## 2022-08-01 RX ADMIN — Medication 10 MILLIGRAM(S): at 18:03

## 2022-08-01 RX ADMIN — Medication 650 MILLIGRAM(S): at 07:20

## 2022-08-01 RX ADMIN — HEPARIN SODIUM 5000 UNIT(S): 5000 INJECTION INTRAVENOUS; SUBCUTANEOUS at 05:08

## 2022-08-01 RX ADMIN — Medication 25 MILLIGRAM(S): at 06:52

## 2022-08-01 RX ADMIN — Medication 25 MILLIGRAM(S): at 05:08

## 2022-08-01 RX ADMIN — DULOXETINE HYDROCHLORIDE 60 MILLIGRAM(S): 30 CAPSULE, DELAYED RELEASE ORAL at 13:08

## 2022-08-01 NOTE — PROGRESS NOTE ADULT - SUBJECTIVE AND OBJECTIVE BOX
ISHMAEL, DRAKE  89y  Female  ***My note supersedes ALL resident notes that I sign.  My corrections for their notes are in my note.***    I can be reached directly on [email or web address] [id number]. My office number is [phone]. My personal cell number is [phone].    INTERVAL EVENTS: Here for f/u of MANA. Pt doing better. Pt is a little weak in terms of baseline walking. Using walker and participating w/ PT. RN notes some minor sundowning.    T(F): 97.5 (08-01-22 @ 13:21), Max: 99.2 (08-01-22 @ 04:50)  HR: 65 (08-01-22 @ 13:21) (60 - 73)  BP: 158/68 (08-01-22 @ 13:21) (134/63 - 200/90)  RR: 18 (08-01-22 @ 13:21) (18 - 18)  SpO2: 96% (08-01-22 @ 08:50) (96% - 96%)    Gen: NAD  HEENT: PERRL, EOMI, mouth clr, nose clr  Neck: no nodes, no JVD, thyroid nl  lungs: clr  hrt: s1 s2 rrr no murmur  abd: soft, NT/ND, no HS megaly  ext: no edema, no c/c  neuro: aa ox3, cn intact, can move all 4 ext - mild weakness b/l legs    LABS:                     7.8     (    97.1   7.36  )-----------( ---------      164      ( 01 Aug 2022 08:56 )             23.7    (    15.8     Hemoglobin: 7.8 g/dL (08-01 @ 08:56)  Hemoglobin: 7.1 g/dL (07-31 @ 08:41)  Hemoglobin: 7.1 g/dL (07-30 @ 07:45)  Hemoglobin: 8.0 g/dL (07-28 @ 09:34)    139   (   106   (   163      08-01-22 @ 08:56  ----------------------               4.7   (   21   (   34                             -----                        2.2  Ca  9.5   Mg  1.5    P   --     LFT  5.9  (  0.5  (  23       08-01-22 @ 08:56  -------------------------  3.5  (  139  (  96    Alb 3.5  T catherine 0.5     AST 23  ALT 96  08-01-22 @ 08:56 - much better  Alb 3.2  T catherine 0.4     AST 22    07-31-22 @ 08:41  Alb 3.2  T catherine 0.4     AST 39    07-30-22 @ 07:45  Alb 3.5  T catherine 0.8         07-28-22 @ 09:34    Culture - Blood (collected 07-28-22 @ 14:15)  Source: .Blood None  Preliminary Report (07-29-22 @ 23:01):    No growth to date.    Culture - Urine (collected 07-27-22 @ 13:37)  Source: Clean Catch Clean Catch (Midstream)  Final Report (07-30-22 @ 19:16):    >100,000 CFU/ml Escherichia coli  Organism: Escherichia coli (07-30-22 @ 19:16)  Organism: Escherichia coli (07-30-22 @ 19:16)      -  Amikacin: S <=16      -  Amoxicillin/Clavulanic Acid: S <=8/4 Consider reserving for cystitis when ampicillin/sulbactam is resistant      -  Ampicillin: R >16 These ampicillin results predict results for amoxicillin      -  Ampicillin/Sulbactam: R >16/8 Enterobacter, Klebsiella aerogenes, Citrobacter, and Serratia may develop resistance during prolonged therapy (3-4 days)      -  Aztreonam: S <=4      -  Cefazolin: S 4 ([email or web address]) For uncomplicated UTI with K. pneumoniae, E. coli, or P. mirablis: GEOVANNA <=16 is sensitive and GEOVANNA >=32 is resistant. This also predicts results for oral agents cefaclor, cefdinir, cefpodoxime, cefprozil, cefuroxime axetil, cephalexin and locarbef for uncomplicated UTI. Note that some isolates may be susceptible to these agents while testing resistant to cefazolin.      -  Cefepime: S <=2      -  Cefoxitin: S <=8      -  Ceftriaxone: S <=1 Enterobacter, Klebsiella aerogenes, Citrobacter, and Serratia may develop resistance during prolonged therapy      -  Ciprofloxacin: R >2      -  Ertapenem: S <=0.5      -  Gentamicin: S <=2      -  Imipenem: S <=1      -  Levofloxacin: R >4      -  Meropenem: S <=1      -  Nitrofurantoin: S <=32 Should not be used to treat pyelonephritis      -  Piperacillin/Tazobactam: S <=8      -  Tigecycline: S <=2      -  Tobramycin: S <=2      -  Trimethoprim/Sulfamethoxazole: R >2/38      Method Type: GEOVANNA    RADIOLOGY & ADDITIONAL TESTS:      MEDICATIONS:    acetaminophen     Tablet .. 650 milliGRAM(s) Oral every 6 hours PRN  amLODIPine   Tablet 10 milliGRAM(s) Oral daily  DULoxetine 60 milliGRAM(s) Oral daily  heparin   Injectable 5000 Unit(s) SubCutaneous every 12 hours  levothyroxine 75 MICROGram(s) Oral daily  metoprolol tartrate 25 milliGRAM(s) Oral two times a day  simvastatin 20 milliGRAM(s) Oral at bedtime  tiotropium 18 MICROgram(s) Capsule 1 Capsule(s) Inhalation daily

## 2022-08-01 NOTE — CONSULT NOTE ADULT - SUBJECTIVE AND OBJECTIVE BOX
HPI:  89F w/ h/o HTN, DLD, COPD, hypothyroidism, iron deficiency anemia, bladder cancer (not on active treatment), RCC (s/p left nephrectomy), and lung cancer (s/p left lobectomy) p/w 4 days of progressively worsening abdominal pain a/w severe diarrhea. Abdominal pain localized to suprapubic area. No known aggravating or alleviating factors. Recently diagnosed w/ UTI by PMD and rx'd Bactrim x5 days. Developed severe diarrhea yesterday (unable to count number of episodes). Stool was black in color, but chronic for pt as she takes oral iron supplementation. Had one episode of diarrhea this AM, but no repeat episodes. Abdominal pain has significantly improved today compared to yesterday. No fevers, CP, SOB, or LE swelling. No recent travel or sick contacts.    In ED, pt's BP 98/52, but improved to 133/58 after 1L NS bolus. UA concerning for UTI, so pt given ceftriaxone x1. Labs demonstrated anemia (Hb 7.0), for which pt received 1u pRBC. Labs also concerning for acute transaminitis. Pt admitted to medicine for further w/u.    Attd: pt lives on the 1st floor on her own and her family is above her and help her a lot; in the home, pt is quite independent: showers and dresses herself; feeds herself; walks fine w/ cane; does some light chores; pt rarely takes stairs to go to 2nd floor (gets very winded); pt has active bladder cancer, but is not bothered by it; she will NOT go for any more surgeries for the remainder of her life; she will go for tests and accept most care in order to cont living - she feels she has good QOL; she had abd pain and diarrhea; pt took Bactrim for 5 days as outpt - she says the med did not agree w/ her; she is feeling better today than when she arrived;  (27 Jul 2022 20:26)    PERTINENT PM/SXH:   COPD (chronic obstructive pulmonary disease)    Cancer    SOB (shortness of breath)    HTN (hypertension)    Other type of osteoarthritis    Bruise    Colitis    Back pain, unspecified back location, unspecified back pain laterality, unspecified chronicity    Eye problem    COPD with emphysema    Hypothyroidism    Bladder cancer    Osteoarthritis    Dyslipidemia      History of hip replacement, total, right    History of total left knee replacement (TKR)    History of nephrectomy    Malignant neoplasm of urinary bladder, unspecified site    H/O breast surgery    History of lung surgery      FAMILY HISTORY:  Family history of cancer in sister (Sibling)      ITEMS NOT CHECKED ARE NOT PRESENT    SOCIAL HISTORY:   Significant other/partner[ ]  Children[ x]  Confucianism/Spirituality:  Substance hx:  [ ]   Tobacco hx:  [ ]   Alcohol hx: [ ]   Living Situation: [ ]Home  [ ]Long term care  [ ]Rehab [ ]Other  Home Services: [ ] HHA [ ] Rosette RN [ ] Hospice  Occupation:  Home Opioid hx:  [ ] Y [ ] N [ ] I-Stop Reference No:    ADVANCE DIRECTIVES:    DNR  MOLST  [ ]  Living Will  [ ]   DECISION MAKER(s):  [ x] Health Care Proxy(s)  [ ] Surrogate(s)  [ ] Guardian           Name(s): Phone Number(s):  Jm Rhoades [phone] - HCP  Evelyn Rhoades [phone] - [facility]   BASELINE (I)ADL(s) (prior to admission):  [place]: [ ]Total  [x ] Moderate [ ]Dependent  Palliative Performance Status Version 2:         %    http://npcrc.org/files/news/palliative_performance_scale_ppsv2.pdf    Allergies    Contrast Dye (Hives; Rash)  sulfa drugs (Urticaria)    Intolerances    MEDICATIONS  (STANDING):  amLODIPine   Tablet 10 milliGRAM(s) Oral daily  DULoxetine 60 milliGRAM(s) Oral daily  heparin   Injectable 5000 Unit(s) SubCutaneous every 12 hours  levothyroxine 75 MICROGram(s) Oral daily  metoprolol tartrate 25 milliGRAM(s) Oral two times a day  simvastatin 20 milliGRAM(s) Oral at bedtime  tiotropium 18 MICROgram(s) Capsule 1 Capsule(s) Inhalation daily    MEDICATIONS  (PRN):  acetaminophen     Tablet .. 650 milliGRAM(s) Oral every 6 hours PRN Mild Pain (1 - 3)    PRESENT SYMPTOMS: [ ]Unable to obtain due to poor mentation   Source if other than patient:  [ ]Family   [ ]Team     Pain: [ ]yes [ x]no  QOL impact -   Location -                    Aggravating factors -  Quality -  Radiation -  Timing-  Severity (0-10 scale):  Minimal acceptable level (0-10 scale):     CPOT:    https://www.sccm.org/getattachment/gcy29u16-5f7p-6x7m-9v1e-5080c8364v4h/Critical-Care-Pain-Observation-Tool-(CPOT)      PAIN AD Score:     http://geriatrictoolkit.[email or web address]/[email or web address].pdf (press ctrl +  left click to view)    Dyspnea:                           [ ]Mild [ ]Moderate [ ]Severe  Anxiety:                             [ ]Mild [ ]Moderate [ ]Severe  Fatigue:                             [ ]Mild [ ]Moderate [ ]Severe  Nausea:                             [ ]Mild [ ]Moderate [ ]Severe  Loss of appetite:              [ ]Mild [ ]Moderate [ ]Severe  Constipation:                    [ ]Mild [ ]Moderate [ ]Severe    Other Symptoms:  [ x]All other review of systems negative     Palliative Performance Status Version 2:         %    http://npcrc.org/files/news/palliative_performance_scale_ppsv2.pdf  PHYSICAL EXAM:  Vital Signs Last 24 Hrs  T(C): 36.4 (01 Aug 2022 13:21), Max: 37.3 (01 Aug 2022 04:50)  T(F): 97.5 (01 Aug 2022 13:21), Max: 99.2 (01 Aug 2022 04:50)  HR: 65 (01 Aug 2022 13:21) (60 - 73)  BP: 158/68 (01 Aug 2022 13:21) (134/63 - 200/90)  BP(mean): --  RR: 18 (01 Aug 2022 13:21) (18 - 18)  SpO2: 96% (01 Aug 2022 08:50) (96% - 96%)    Parameters below as of 01 Aug 2022 08:50  Patient On (Oxygen Delivery Method): room air     I&O's Summary    31 Jul 2022 07:01  -  01 Aug 2022 07:00  --------------------------------------------------------  IN: 0 mL / OUT: 300 mL / NET: -300 mL      GENERAL:  [x ]Alert  [x ]Oriented x 3  [ ]Lethargic  [ ]Cachexia  [ ]Unarousable  [ ]Verbal  [ ]Non-Verbal  Behavioral:   [ ] Anxiety  [ ] Delirium [ ] Agitation [ ] Other  HEENT:  [x ]Normal   [ ]Dry mouth   [ ]ET Tube/Trach  [ ]Oral lesions  PULMONARY:   [x ]Clear [ ]Tachypnea  [ ]Audible excessive secretions   [ ]Rhonchi        [ ]Right [ ]Left [ ]Bilateral  [ ]Crackles        [ ]Right [ ]Left [ ]Bilateral  [ ]Wheezing     [ ]Right [ ]Left [ ]Bilateral  [ ]Diminished breath sounds [ ]right [ ]left [ ]bilateral  CARDIOVASCULAR:    [x ]Regular [ ]Irregular [ ]Tachy  [ ]Michael [ ]Murmur [ ]Other  GASTROINTESTINAL:  [ x]Soft  [ ]Distended   [ ]+BS  [ ]Non tender [ ]Tender  [ ]PEG [ ]OGT/ NGT  Last BM:   GENITOURINARY:  [x ]Normal [ ] Incontinent   [ ]Oliguria/Anuria   [ ]Barrios  MUSCULOSKELETAL:   [ ]Normal   [x ]Weakness  [ ]Bed/Wheelchair bound [ ]Edema  NEUROLOGIC:   [ x]No focal deficits  [ ]Cognitive impairment  [ ]Dysphagia [ ]Dysarthria [ ]Paresis [ ]Other   SKIN:   [x ]Normal    [ ]Rash  [ ]Pressure ulcer(s)       Present on admission [ ]y [ ]n      LABS: reviewed                         7.8    7.36  )-----------( 164      ( 01 Aug 2022 08:56 )             23.7   08-01    139  |  106  |  34<H>  ----------------------------<  163<H>  4.7   |  21  |  2.2<H>    Ca    9.5      01 Aug 2022 08:56  Mg     1.5     08-01    TPro  5.9<L>  /  Alb  3.5  /  TBili  0.5  /  DBili  x   /  AST  23  /  ALT  96<H>  /  AlkPhos  139<H>  08-01        RADIOLOGY & ADDITIONAL STUDIES:    PROTEIN CALORIE MALNUTRITION PRESENT: [ ]mild [ ]moderate [ ]severe [ ]underweight [ ]morbid obesity  https://www.andeal.org/vault/2440/web/files/ONC/Table_Clinical%20Characteristics%20to%20Document%20Malnutrition-White%20JV%20et%20al%202012.pdf    Height (cm): 162.6 (07-27-22 @ 15:57), 167.6 (04-06-22 @ 15:37), 160 (12-22-21 @ 13:43)  Weight (kg): 66.8 (07-27-22 @ 19:02), 62.1 (04-06-22 @ 15:37), 62.6 (12-22-21 @ 13:43)  BMI (kg/m2): 25.3 (07-27-22 @ 19:02), 23.5 (07-27-22 @ 15:57), 22.1 (04-06-22 @ 15:37)    [ ]PPSV2 < or = to 30% [ ]significant weight loss  [ ]poor nutritional intake  [ ]anasarca      [ ]Artificial Nutrition      REFERRALS:   [ ]Chaplaincy  [ ]Hospice  [ ]Child Life  [x ]Social Work  [x ]Case management [ ]Holistic Therapy     Goals of Care Document:        CC: diarrhea    HPI:  89F w/ h/o HTN, DLD, COPD, hypothyroidism, iron deficiency anemia, bladder cancer (not on active treatment), RCC (s/p left nephrectomy), and lung cancer (s/p left lobectomy) p/w 4 days of progressively worsening abdominal pain a/w severe diarrhea. Abdominal pain localized to suprapubic area. No known aggravating or alleviating factors. Recently diagnosed w/ UTI by PMD and rx'd Bactrim x5 days. Developed severe diarrhea yesterday (unable to count number of episodes). Stool was black in color, but chronic for pt as she takes oral iron supplementation. Had one episode of diarrhea this AM, but no repeat episodes. Abdominal pain has significantly improved today compared to yesterday. No fevers, CP, SOB, or LE swelling. No recent travel or sick contacts.    In ED, pt's BP 98/52, but improved to 133/58 after 1L NS bolus. UA concerning for UTI, so pt given ceftriaxone x1. Labs demonstrated anemia (Hb 7.0), for which pt received 1u pRBC. Labs also concerning for acute transaminitis. Pt admitted to medicine for further w/u.    Attd: pt lives on the 1st floor on her own and her family is above her and help her a lot; in the home, pt is quite independent: showers and dresses herself; feeds herself; walks fine w/ cane; does some light chores; pt rarely takes stairs to go to 2nd floor (gets very winded); pt has active bladder cancer, but is not bothered by it; she will NOT go for any more surgeries for the remainder of her life; she will go for tests and accept most care in order to cont living - she feels she has good QOL; she had abd pain and diarrhea; pt took Bactrim for 5 days as outpt - she says the med did not agree w/ her; she is feeling better today than when she arrived;  (27 Jul 2022 20:26)    PERTINENT PM/SXH:   COPD (chronic obstructive pulmonary disease)    Cancer    SOB (shortness of breath)    HTN (hypertension)    Other type of osteoarthritis    Bruise    Colitis    Back pain, unspecified back location, unspecified back pain laterality, unspecified chronicity    Eye problem    COPD with emphysema    Hypothyroidism    Bladder cancer    Osteoarthritis    Dyslipidemia      History of hip replacement, total, right    History of total left knee replacement (TKR)    History of nephrectomy    Malignant neoplasm of urinary bladder, unspecified site    H/O breast surgery    History of lung surgery      FAMILY HISTORY:  Family history of cancer in sister (Sibling)      ITEMS NOT CHECKED ARE NOT PRESENT    SOCIAL HISTORY:   Significant other/partner[ ]  Children[ x]  Temple/Spirituality:  Substance hx:  [ ]   Tobacco hx:  [ ]   Alcohol hx: [ ]   Living Situation: [ ]Home  [ ]Long term care  [ ]Rehab [ ]Other  Home Services: [ ] HHA [ ] Visting RN [ ] Hospice  Occupation:  Home Opioid hx:  [x] Y [ ] N [x ] I-Stop Reference No:  Reference #: [id number]      Others' Prescriptions  Patient Name: Jayda RhoadesBirth Date: 04/15/1927  Address: [address] [place], [place] [address]Sex: Female  Rx Written	Rx Dispensed	Drug	Quantity	Days Supply	Prescriber Name	Prescriber Bonny #	Payment Method	Dispenser  01/26/2022	01/28/2022	hydrocodone-acetaminophen 5-325 mg tablet	14	7	Em Be MD	[id number]	Medicare	[facility] [id number]      ADVANCE DIRECTIVES:    DNR  MOLST  [ ]  Living Will  [ ]   DECISION MAKER(s):  [ x] Health Care Proxy(s)  [ ] Surrogate(s)  [ ] Guardian           Name(s): Phone Number(s):  Jm Rhoades [phone] - [facility]  Evelyn Rhoades [phone] - [facility]   BASELINE (I)ADL(s) (prior to admission):  [place]: [ ]Total  [x ] Moderate [ ]Dependent  Palliative Performance Status Version 2:        40 %    http://npcrc.org/files/news/palliative_performance_scale_ppsv2.pdf    Allergies    Contrast Dye (Hives; Rash)  sulfa drugs (Urticaria)    Intolerances    MEDICATIONS  (STANDING):  amLODIPine   Tablet 10 milliGRAM(s) Oral daily  DULoxetine 60 milliGRAM(s) Oral daily  heparin   Injectable 5000 Unit(s) SubCutaneous every 12 hours  levothyroxine 75 MICROGram(s) Oral daily  metoprolol tartrate 25 milliGRAM(s) Oral two times a day  simvastatin 20 milliGRAM(s) Oral at bedtime  tiotropium 18 MICROgram(s) Capsule 1 Capsule(s) Inhalation daily    MEDICATIONS  (PRN):  acetaminophen     Tablet .. 650 milliGRAM(s) Oral every 6 hours PRN Mild Pain (1 - 3)    PRESENT SYMPTOMS: [ ]Unable to obtain due to poor mentation   Source if other than patient:  [ ]Family   [ ]Team     Pain: [ ]yes [ x]no  QOL impact -   Location -                    Aggravating factors -  Quality -  Radiation -  Timing-  Severity (0-10 scale):  Minimal acceptable level (0-10 scale):       Dyspnea:                           [ ]Mild [ ]Moderate [ ]Severe  Anxiety:                             [ ]Mild [ ]Moderate [ ]Severe  Fatigue:                             [ ]Mild [ ]Moderate [ ]Severe  Nausea:                             [ ]Mild [ ]Moderate [ ]Severe  Loss of appetite:              [ ]Mild [ ]Moderate [ ]Severe  Constipation:                    [ ]Mild [ ]Moderate [ ]Severe    Other Symptoms:  [ x]All other review of systems negative     Palliative Performance Status Version 2:         30%    http://npcrc.org/files/news/palliative_performance_scale_ppsv2.pdf    PHYSICAL EXAM:  Vital Signs Last 24 Hrs  T(C): 36.4 (01 Aug 2022 13:21), Max: 37.3 (01 Aug 2022 04:50)  T(F): 97.5 (01 Aug 2022 13:21), Max: 99.2 (01 Aug 2022 04:50)  HR: 65 (01 Aug 2022 13:21) (60 - 73)  BP: 158/68 (01 Aug 2022 13:21) (134/63 - 200/90)  BP(mean): --  RR: 18 (01 Aug 2022 13:21) (18 - 18)  SpO2: 96% (01 Aug 2022 08:50) (96% - 96%)    Parameters below as of 01 Aug 2022 08:50  Patient On (Oxygen Delivery Method): room air     I&O's Summary    31 Jul 2022 07:01  -  01 Aug 2022 07:00  --------------------------------------------------------  IN: 0 mL / OUT: 300 mL / NET: -300 mL      GENERAL:  [x ]Alert  [x ]Oriented x 3  [ ]Lethargic  [ ]Cachexia  [ ]Unarousable  [ ]Verbal  [ ]Non-Verbal  Behavioral:   [ ] Anxiety  [ ] Delirium [ ] Agitation [ ] Other  HEENT:  [x ]Normal   [ ]Dry mouth   [ ]ET Tube/Trach  [ ]Oral lesions  PULMONARY:   [x ]Clear [ ]Tachypnea  [ ]Audible excessive secretions   [ ]Rhonchi        [ ]Right [ ]Left [ ]Bilateral  [ ]Crackles        [ ]Right [ ]Left [ ]Bilateral  [ ]Wheezing     [ ]Right [ ]Left [ ]Bilateral  [ ]Diminished breath sounds [ ]right [ ]left [ ]bilateral  CARDIOVASCULAR:    [x ]Regular [ ]Irregular [ ]Tachy  [ ]Michael [ ]Murmur [ ]Other  GASTROINTESTINAL:  [ x]Soft  [ ]Distended   [ ]+BS  [ ]Non tender [ ]Tender  [ ]PEG [ ]OGT/ NGT  Last BM:   GENITOURINARY:  [x ]Normal [ ] Incontinent   [ ]Oliguria/Anuria   [ ]Barrios  MUSCULOSKELETAL:   [ ]Normal   [x ]Weakness  [ ]Bed/Wheelchair bound [ ]Edema  NEUROLOGIC:   [ x]No focal deficits  [ ]Cognitive impairment  [ ]Dysphagia [ ]Dysarthria [ ]Paresis [ ]Other   SKIN:   [x ]Normal    [ ]Rash  [ ]Pressure ulcer(s)       Present on admission [ ]y [ ]n      LABS: reviewed                         7.8    7.36  )-----------( 164      ( 01 Aug 2022 08:56 )             23.7   08-01    139  |  106  |  34<H>  ----------------------------<  163<H>  4.7   |  21  |  2.2<H>    Ca    9.5      01 Aug 2022 08:56  Mg     1.5     08-01    TPro  5.9<L>  /  Alb  3.5  /  TBili  0.5  /  DBili  x   /  AST  23  /  ALT  96<H>  /  AlkPhos  139<H>  08-01      RADIOLOGY & ADDITIONAL STUDIES: reviewed  < from: US Abdomen Upper Quadrant Right (07.27.22 @ 21:39) >  IMPRESSION:  1.  No definite sonographic evidence of liver parenchymal abnormality.  2.  Surgically absent gallbladder with intra and extrahepatic biliary   ductal dilatation. Common bile duct measures 17 mm on the current exam,   slightly increased from CT from 2019 it measured approximately 15 mm.  3.  Normal hepatic Doppler.    < from: 12 Lead ECG (07.27.22 @ 08:01) >  Ventricular Rate 60 BPM    Atrial Rate 60 BPM    P-R Interval 204 ms    QRS Duration 138 ms    Q-T Interval 496 ms    QTC Calculation(Bazett) 496 ms    R Axis -29 degrees    T Axis -20 degrees    Diagnosis Line Normal sinus rhythm with sinus arrhythmia  Right bundle branch block  Minimal voltage criteria for LVH, may be normal variant ( R in aVL )  Abnormal ECG    < end of copied text >      < end of copied text >      PROTEIN CALORIE MALNUTRITION PRESENT: [ ]mild [ ]moderate [ ]severe [ ]underweight [ ]morbid obesity  https://www.andeal.org/vault/2440/web/files/ONC/Table_Clinical%20Characteristics%20to%20Document%20Malnutrition-White%20JV%20et%20al%335838.pdf    Height (cm): 162.6 (07-27-22 @ 15:57), 167.6 (04-06-22 @ 15:37), 160 (12-22-21 @ 13:43)  Weight (kg): 66.8 (07-27-22 @ 19:02), 62.1 (04-06-22 @ 15:37), 62.6 (12-22-21 @ 13:43)  BMI (kg/m2): 25.3 (07-27-22 @ 19:02), 23.5 (07-27-22 @ 15:57), 22.1 (04-06-22 @ 15:37)    [ ]PPSV2 < or = to 30% [ ]significant weight loss  [ ]poor nutritional intake  [ ]anasarca      [ ]Artificial Nutrition      REFERRALS:   [ ]Chaplaincy  [ ]Hospice  [ ]Child Life  [x ]Social Work  [x ]Case management [ ]Holistic Therapy     Goals of Care Document:

## 2022-08-01 NOTE — DISCHARGE NOTE PROVIDER - NSDCFUSCHEDAPPT_GEN_ALL_CORE_FT
[facility]  [facility] [address]   Scheduled Appointment: 08/04/2022    Amish Farris  [facility]  [facility] [address]  Scheduled Appointment: 08/11/2022    [facility]  [facility] [address]   Scheduled Appointment: 08/11/2022    [facility]  [facility] [address]   Scheduled Appointment: 08/25/2022

## 2022-08-01 NOTE — DISCHARGE NOTE PROVIDER - NSDCMRMEDTOKEN_GEN_ALL_CORE_FT
amLODIPine 5 mg oral tablet: 1 tab(s) orally once a day  DULoxetine 60 mg oral delayed release capsule: 1 cap(s) orally once a day  Incruse Ellipta 62.5 mcg/inh inhalation powder: 1 puff(s) inhaled once a day  levothyroxine 75 mcg (0.075 mg) oral tablet: 1 tab(s) orally once a day  Metoprolol Tartrate 25 mg oral tablet: 1 tab(s) orally 2 times a day  simvastatin 20 mg oral tablet: 1 tab(s) orally once a day (at bedtime)   amLODIPine 10 mg oral tablet: 1 tab(s) orally once a day  DULoxetine 60 mg oral delayed release capsule: 1 cap(s) orally once a day  Incruse Ellipta 62.5 mcg/inh inhalation powder: 1 puff(s) inhaled once a day  levothyroxine 75 mcg (0.075 mg) oral tablet: 1 tab(s) orally once a day  Metoprolol Tartrate 25 mg oral tablet: 1 tab(s) orally 2 times a day  simvastatin 20 mg oral tablet: 1 tab(s) orally once a day (at bedtime)   albuterol 90 mcg/inh inhalation aerosol: 1 puff(s) inhaled every 4 hours, As needed, Wheezing  amLODIPine 10 mg oral tablet: 1 tab(s) orally once a day  cefpodoxime 100 mg oral tablet: 1 tab(s) orally once a day, for 4 more days, last dose August 7th.   DULoxetine 60 mg oral delayed release capsule: 1 cap(s) orally once a day  hydrALAZINE 10 mg oral tablet: 1 tab(s) orally 2 times a day  Incruse Ellipta 62.5 mcg/inh inhalation powder: 1 puff(s) inhaled once a day  levothyroxine 75 mcg (0.075 mg) oral tablet: 1 tab(s) orally once a day  lidocaine 4% topical film: Apply topically to affected area once a day  Metoprolol Tartrate 25 mg oral tablet: 1 tab(s) orally 2 times a day  simvastatin 20 mg oral tablet: 1 tab(s) orally once a day (at bedtime)

## 2022-08-01 NOTE — DISCHARGE NOTE PROVIDER - HOSPITAL COURSE
89 yo patient who presented for abdominal pain and diarrhea. Patient had localized pain to the suprapubic area. Patients blood pressure on admission was 98/52, so she was given a 1 L bolus, and blood pressure went up to 133/58. Patient was treated out patient for     Patient was admitted for possible sepsis and started of ceftriaxone 1g in 24 hours. She was also found to have an MANA on CKD with a creatine bump to 3.2 (baseline 2.2), as well as a liver injury with AST >1300 and ALT >800.     The patient was 89 yo patient who presented for abdominal pain and diarrhea. Patient had localized pain to the suprapubic area. Patients blood pressure on admission was 98/52, so she was given a 1 L bolus, and blood pressure went up to 133/58. Patient was treated out patient for UTI by her primary care doctor with Bactrim.    Patient was admitted for possible sepsis and started of ceftriaxone 1g in 24 hours. Urine culture resulted with >100,000 colonies of E coli. The E coli was resistant to bactrim which is why the patient had the worsening UTI.     She was also found to have an MANA on CKD with a creatine bump to 3.2 (baseline 2.2), as well as a liver injury with AST >1300 and ALT >800. The bump in creatinine was most likely prerenal 2/2 dehydration and diarrhea. The liver injury was most likely due to the bactrim, however the patients statin was also held during the hospitalization. Patient now clear to restart the statin and needs repeat CMP within the next 2 weeks in order to monitor the LFT's.    The patient is now medically stable. After discussion with the family, patient will go to short term rehab in order to regain some strength and independence and a finalized plan will be made there.

## 2022-08-01 NOTE — DISCHARGE NOTE PROVIDER - CARE PROVIDER_API CALL
Haim Lee)  Internal Medicine; Nephrology  [address] [place] [address]  Phone: [phone]  Fax: [phone]  Follow Up Time: 1 month

## 2022-08-01 NOTE — DISCHARGE NOTE PROVIDER - NSDCCPCAREPLAN_GEN_ALL_CORE_FT
PRINCIPAL DISCHARGE DIAGNOSIS  Diagnosis: Acute UTI  Assessment and Plan of Treatment: A viral respiratory infection is an illness that affects parts of the body used for breathing, like the lungs, nose, and throat. It is caused by a germ called a virus. Symptoms can include runny nose, coughing, sneezing, fatigue, body aches, sore throat, fever, or headache. Over the counter medicine can be used to manage the symptoms but the infection typically goes away on its own in 5 to 10 days.   SEEK IMMEDIATE MEDICAL CARE IF YOU HAVE ANY OF THE FOLLOWING SYMPTOMS: shortness of breath, chest pain, fever over 10 days, or lightheadedness/dizziness.        SECONDARY DISCHARGE DIAGNOSES  Diagnosis: MANA (acute kidney injury)  Assessment and Plan of Treatment: You were noted to have a temporary insult to your kidney function either at the time that you arrived at the hospital or during your stay here. We have monitored your kidney function with blood work during your time here and you are at a level that no longer requires continued hospital level care. We do recommend that you follow up to continually have your kidney function checked. You can follow up with your primary care doctor, or, if recommended in the discharge paperwork, you should follow up with a kidney specialist called a nephrologist.      Diagnosis: Anemia  Assessment and Plan of Treatment:     Diagnosis: Acute UTI  Assessment and Plan of Treatment:     Diagnosis: Abdominal pain  Assessment and Plan of Treatment:     Diagnosis: Elevated LFTs  Assessment and Plan of Treatment:

## 2022-08-01 NOTE — CONSULT NOTE ADULT - PROBLEM SELECTOR RECOMMENDATION 9
Family meeting today, palliative care introduced  DNR/DNI placed  Plan for rehab in SNF, if patient has a decline family is aware of hospice option Family meeting today, palliative care introduced  DNR/DNI placed  New MOLST filled out   Plan for rehab in SNF, if patient has a decline family is aware of hospice option

## 2022-08-01 NOTE — CONSULT NOTE ADULT - ASSESSMENT
89yFemale being evaluated for goals of care and symptom management. Pt denies pain or dyspnea. She has medically improved came in with renal and hepatic failure that reversed after IV hydration and treatment. Pt also had diarrhea that resolved as well. Pt has CKD stage 3-4 so her GFR is in the 20s at baseline and she has anemia r/t kidney failure. She is a candidate for rehab. Despite previous cancers, none are metastatic and primary team did not feel she was hospice appropriate as yet.         See Recs below.    Please call [phone] with questions or concerns 24/7.   We will continue to follow.

## 2022-08-01 NOTE — CONSULT NOTE ADULT - CONVERSATION DETAILS
Patient deferred all decisions to her son Jm and his wife Evelyn despite being alert and oriented x 3   HCP form with Jm and Evelyn listed in chart already    met with Jm and Evelyn, they know mom's wishes. She is 89 and had a few different cancers. They explained her wish was never to be on machines. She is starting to decline physically and she does want rehab and to see if she can improve.     If patient keeps declining family made aware of the option of hospice care.     Son and daughter in law agreed with DNR/DNI, MOLST completed. Patient oked all decisions made by Julio

## 2022-08-01 NOTE — CONSULT NOTE ADULT - NS ATTEND AMEND GEN_ALL_CORE FT
Patient seen and examined on 7/28/2022; during the GI-Advanced endoscopy rounds, case discussed, assessment and plan as above
Patient seen at bedside  Alert and oriented  She wished to defer decision making to son  Son wishes to make patinet DNR/DNI with ongoing medical management and rehab on discharge  Will follow  High Risk as patient made DNR/DNI

## 2022-08-01 NOTE — CONSULT NOTE ADULT - PROBLEM SELECTOR RECOMMENDATION 2
Ongoing medical management  H/O nephrectomy r/t RCC   labs improved during hospital stay, pt has chronic GFR in the 20s  Supportive care  nephrology follow up, family would not consent to dialysis as per son

## 2022-08-01 NOTE — PROGRESS NOTE ADULT - ASSESSMENT
89F w/ h/o HTN, DLD, COPD, hypothyroidism, iron deficiency anemia, bladder cancer (not on active treatment), RCC (s/p left nephrectomy - in remission), and lung cancer (s/p left lobectomy - in remission) p/w 4 days of progressively worsening abdominal pain a/w severe diarrhea. Admission labs concerning for acute transaminitis, MANA, and acute on chronic anemia.     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    # Urinary Tract Infection; no sepsis  Recently rx'd Bactrim x5 days (current E Coli resist to bactrim)  UA + (positive nitrite, large LE, , and moderate bacteria)  abx: completed ceftriaxone x3 days  BCx x1: neg  UCx: E Coli > 100K -> suscept to cephalo    # Acute Kidney Injury (improved); CKD 4 w/ solitary Kidney (base Cr upper 1s to 2); s/p Lt nephrectomy  Uosm 408; Dylan 60  calc FeNa: need UCr, Dylan and BMP  IVFs: d/c  renal eval  CT A/P: no hydro on rt; non-obst renal calc on rt; not much seen in terms of bladder cancer or mets    # Acute Transaminitis - suspect drug-Induced liver Injury 2/2 bactrim; s/p CCY; intra/extra hep duct dil - NOT new (CBD 15mm in 2019 and now 17mm)  acute transaminitis (AST/ALT 1694/802) - Liver enzymes WNL in April 2022; PT/PTT nl  CT A/P demonstrates intrahepatic and extrahepatic ductal dilatation, but no masses noted.  lack of eosinophilia  Adv GI eval for duct dil: noted - no further w/u given resolution of LFTs and lack of pain  cancel MRCP   CEA lvl 3.5 (OK)  RUQ U/S: no thrombosis  acute hepatitis panel (r/o acute viral hepatitis): neg  autoimmune markers (AMA, SMA): neg  tylenol and alcohol levels are nl  OK to feed    # Acute hemolytic anemia on chronic macro anemia of CKD  Hb 7.0 (baseline Hb 7-9 per HIE)  keep hgb >7  JOSE R negative  S/P 1u pRBC   reticulocyte count 4.7%, haptoglobin <20 and  - prob had some hemolysis 2/2 bactrim; since hgb going up, will hold off on further testing  TSH 2.26  check iron, tibc, ferr, B12, Fol    # Diarrhea, resolved    # Hypothyroidism  TSH 2.26  c/w levothyroxine 75mcg PO QD    # Hypertension  c/w amlodipine 10mg PO QD  c/w Lopressor 25mg PO BID  add hydralazine 10mg po q8    # Dyslipidemia  resume statin zocor 20mg po q24 (max dose w/ max norvasc)  check LFTs w/ in 2 wks at NH    # COPD - stable  c/w Spiriva 1 puff QD (therapeutic interchange for Incruse Ellipta)    # Depression  c/w duloxetine DR 60mg PO QD    # DVT PPX: heparin 5000U SQ Q12H    # GI PPX: none indicated    # ACTIVITY: independ at home; walking 30' w/ RW and contact guard - also dizzy during ambulation    # CODE STATUS: Full Code     # I updated son and his wife at bedside - they feel pt should go to STR (I agree)    DISPO: off abx; anemia w/u;   eventually, pt will go to STR at SNF (prefer [facility]) - f/u CM - anticipate d/c jimmy

## 2022-08-02 LAB
ANION GAP SERPL CALC-SCNC: 10 MMOL/L — SIGNIFICANT CHANGE UP (ref 7–14)
BUN SERPL-MCNC: 36 MG/DL — HIGH (ref 10–20)
CALCIUM SERPL-MCNC: 9.4 MG/DL — SIGNIFICANT CHANGE UP (ref 8.5–10.1)
CHLORIDE SERPL-SCNC: 104 MMOL/L — SIGNIFICANT CHANGE UP (ref 98–110)
CO2 SERPL-SCNC: 22 MMOL/L — SIGNIFICANT CHANGE UP (ref 17–32)
CREAT SERPL-MCNC: 2.1 MG/DL — HIGH (ref 0.7–1.5)
CULTURE RESULTS: SIGNIFICANT CHANGE UP
EGFR: 21 ML/MIN/1.73M2 — LOW
FERRITIN SERPL-MCNC: 692 NG/ML — HIGH (ref 15–150)
FOLATE SERPL-MCNC: 6.1 NG/ML — SIGNIFICANT CHANGE UP
GLUCOSE SERPL-MCNC: 119 MG/DL — HIGH (ref 70–99)
HCT VFR BLD CALC: 25.4 % — LOW (ref 37–47)
HGB BLD-MCNC: 8 G/DL — LOW (ref 12–16)
IRON SATN MFR SERPL: 18 % — SIGNIFICANT CHANGE UP (ref 15–50)
IRON SATN MFR SERPL: 39 UG/DL — SIGNIFICANT CHANGE UP (ref 35–150)
MAGNESIUM SERPL-MCNC: 1.6 MG/DL — LOW (ref 1.8–2.4)
MCHC RBC-ENTMCNC: 30.3 PG — SIGNIFICANT CHANGE UP (ref 27–31)
MCHC RBC-ENTMCNC: 31.5 G/DL — LOW (ref 32–37)
MCV RBC AUTO: 96.2 FL — SIGNIFICANT CHANGE UP (ref 81–99)
NRBC # BLD: 0 /100 WBCS — SIGNIFICANT CHANGE UP (ref 0–0)
PLATELET # BLD AUTO: 170 K/UL — SIGNIFICANT CHANGE UP (ref 130–400)
POTASSIUM SERPL-MCNC: 5.3 MMOL/L — HIGH (ref 3.5–5)
POTASSIUM SERPL-SCNC: 5.3 MMOL/L — HIGH (ref 3.5–5)
RBC # BLD: 2.64 M/UL — LOW (ref 4.2–5.4)
RBC # FLD: 15.6 % — HIGH (ref 11.5–14.5)
SODIUM SERPL-SCNC: 136 MMOL/L — SIGNIFICANT CHANGE UP (ref 135–146)
SPECIMEN SOURCE: SIGNIFICANT CHANGE UP
TIBC SERPL-MCNC: 222 UG/DL — SIGNIFICANT CHANGE UP (ref 220–430)
UIBC SERPL-MCNC: 183 UG/DL — SIGNIFICANT CHANGE UP (ref 110–370)
VIT B12 SERPL-MCNC: 486 PG/ML — SIGNIFICANT CHANGE UP (ref 232–1245)
WBC # BLD: 7.89 K/UL — SIGNIFICANT CHANGE UP (ref 4.8–10.8)
WBC # FLD AUTO: 7.89 K/UL — SIGNIFICANT CHANGE UP (ref 4.8–10.8)

## 2022-08-02 PROCEDURE — 99233 SBSQ HOSP IP/OBS HIGH 50: CPT

## 2022-08-02 RX ORDER — LIDOCAINE 4 G/100G
1 CREAM TOPICAL EVERY 24 HOURS
Refills: 0 | Status: DISCONTINUED | OUTPATIENT
Start: 2022-08-02 | End: 2022-08-03

## 2022-08-02 RX ORDER — FERROUS SULFATE 325(65) MG
325 TABLET ORAL DAILY
Refills: 0 | Status: DISCONTINUED | OUTPATIENT
Start: 2022-08-02 | End: 2022-08-02

## 2022-08-02 RX ORDER — CEFPODOXIME PROXETIL 100 MG
100 TABLET ORAL DAILY
Refills: 0 | Status: DISCONTINUED | OUTPATIENT
Start: 2022-08-02 | End: 2022-08-03

## 2022-08-02 RX ORDER — ALBUTEROL 90 UG/1
1 AEROSOL, METERED ORAL EVERY 4 HOURS
Refills: 0 | Status: DISCONTINUED | OUTPATIENT
Start: 2022-08-02 | End: 2022-08-03

## 2022-08-02 RX ORDER — HYDRALAZINE HCL 50 MG
25 TABLET ORAL
Refills: 0 | Status: DISCONTINUED | OUTPATIENT
Start: 2022-08-02 | End: 2022-08-02

## 2022-08-02 RX ORDER — HYDRALAZINE HCL 50 MG
10 TABLET ORAL
Refills: 0 | Status: DISCONTINUED | OUTPATIENT
Start: 2022-08-02 | End: 2022-08-03

## 2022-08-02 RX ORDER — SODIUM CHLORIDE 9 MG/ML
500 INJECTION INTRAMUSCULAR; INTRAVENOUS; SUBCUTANEOUS ONCE
Refills: 0 | Status: COMPLETED | OUTPATIENT
Start: 2022-08-02 | End: 2022-08-02

## 2022-08-02 RX ADMIN — SODIUM CHLORIDE 1000 MILLILITER(S): 9 INJECTION INTRAMUSCULAR; INTRAVENOUS; SUBCUTANEOUS at 14:58

## 2022-08-02 RX ADMIN — Medication 650 MILLIGRAM(S): at 14:57

## 2022-08-02 RX ADMIN — Medication 10 MILLIGRAM(S): at 18:15

## 2022-08-02 RX ADMIN — SIMVASTATIN 20 MILLIGRAM(S): 20 TABLET, FILM COATED ORAL at 21:16

## 2022-08-02 RX ADMIN — Medication 10 MILLIGRAM(S): at 05:28

## 2022-08-02 RX ADMIN — HEPARIN SODIUM 5000 UNIT(S): 5000 INJECTION INTRAVENOUS; SUBCUTANEOUS at 18:16

## 2022-08-02 RX ADMIN — Medication 650 MILLIGRAM(S): at 14:59

## 2022-08-02 RX ADMIN — DULOXETINE HYDROCHLORIDE 60 MILLIGRAM(S): 30 CAPSULE, DELAYED RELEASE ORAL at 12:44

## 2022-08-02 RX ADMIN — Medication 100 MILLIGRAM(S): at 12:44

## 2022-08-02 RX ADMIN — Medication 75 MICROGRAM(S): at 05:29

## 2022-08-02 RX ADMIN — MAGNESIUM OXIDE 400 MG ORAL TABLET 400 MILLIGRAM(S): 241.3 TABLET ORAL at 12:44

## 2022-08-02 RX ADMIN — AMLODIPINE BESYLATE 10 MILLIGRAM(S): 2.5 TABLET ORAL at 05:28

## 2022-08-02 RX ADMIN — Medication 25 MILLIGRAM(S): at 05:29

## 2022-08-02 RX ADMIN — TIOTROPIUM BROMIDE 1 CAPSULE(S): 18 CAPSULE ORAL; RESPIRATORY (INHALATION) at 08:04

## 2022-08-02 RX ADMIN — HEPARIN SODIUM 5000 UNIT(S): 5000 INJECTION INTRAVENOUS; SUBCUTANEOUS at 05:29

## 2022-08-02 RX ADMIN — Medication 25 MILLIGRAM(S): at 18:16

## 2022-08-02 NOTE — PROGRESS NOTE ADULT - SUBJECTIVE AND OBJECTIVE BOX
DRAKE QUIÑONES 89y Female  MRN#: [id number]   Hospital Day: 6d    SUBJECTIVE  Patient is a 89y old Female who presents with a chief complaint of Abdominal Pain (01 Aug 2022 15:43)  Currently admitted to medicine with the primary diagnosis of Acute UTI      INTERVAL HPI AND OVERNIGHT EVENTS:  Patient was examined and seen at bedside. This morning she is resting comfortably in bed and reports no issues or overnight events.    REVIEW OF SYMPTOMS:  CONSTITUTIONAL: No weakness, fevers or chills; No headaches  ENT: No sore throat or difficulty swallowing  NECK: No pain or stiffness  RESPIRATORY: No cough; No shortness of breath  CARDIOVASCULAR: No chest pain or palpitations  GASTROINTESTINAL: No abdominal or epigastric pain; No nausea, vomiting, or hematemesis; No diarrhea or constipation; No melena or hematochezia  GENITOURINARY: Intermittent dysuria  MUSCULOSKELETAL: No joint pain, no muscle pain, no weakness  SKIN: No itching or rashes    OBJECTIVE  PAST MEDICAL & SURGICAL HISTORY  HTN (hypertension)    COPD with emphysema    Hypothyroidism    Bladder cancer  not being actively treated    Osteoarthritis    Dyslipidemia    History of hip replacement, total, right    History of total left knee replacement (TKR)    History of nephrectomy  Left    Malignant neoplasm of urinary bladder, unspecified site  Removal x 3    H/O breast surgery  25% calcification removed    History of lung surgery      ALLERGIES:  Contrast Dye (Hives; Rash)  sulfa drugs (Urticaria)    MEDICATIONS:  STANDING MEDICATIONS  amLODIPine   Tablet 10 milliGRAM(s) Oral daily  cefpodoxime 100 milliGRAM(s) Oral daily  DULoxetine 60 milliGRAM(s) Oral daily  heparin   Injectable 5000 Unit(s) SubCutaneous every 12 hours  hydrALAZINE 25 milliGRAM(s) Oral two times a day  levothyroxine 75 MICROGram(s) Oral daily  magnesium oxide 400 milliGRAM(s) Oral daily  metoprolol tartrate 25 milliGRAM(s) Oral two times a day  simvastatin 20 milliGRAM(s) Oral at bedtime  tiotropium 18 MICROgram(s) Capsule 1 Capsule(s) Inhalation daily    PRN MEDICATIONS  acetaminophen     Tablet .. 650 milliGRAM(s) Oral every 6 hours PRN      VITAL SIGNS: Last 24 Hours  T(C): 36.7 (02 Aug 2022 05:04), Max: 36.7 (02 Aug 2022 05:04)  T(F): 98.1 (02 Aug 2022 05:04), Max: 98.1 (02 Aug 2022 05:04)  HR: 63 (02 Aug 2022 05:04) (60 - 74)  BP: 152/70 (02 Aug 2022 05:04) (134/63 - 190/84)  BP(mean): --  RR: 18 (02 Aug 2022 05:04) (18 - 18)  SpO2: 93% (02 Aug 2022 08:15) (93% - 93%)    LABS:                        8.0    7.89  )-----------( 170      ( 02 Aug 2022 08:43 )             25.4     08-01    139  |  106  |  34<H>  ----------------------------<  163<H>  4.7   |  21  |  2.2<H>    Ca    9.5      01 Aug 2022 08:56  Mg     1.5     08-01    TPro  5.9<L>  /  Alb  3.5  /  TBili  0.5  /  DBili  x   /  AST  23  /  ALT  96<H>  /  AlkPhos  139<H>  08-01      PHYSICAL EXAM:  CONSTITUTIONAL: No acute distress, well-developed, well-groomed, AAOx3  HEAD: Atraumatic, normocephalic  EYES: EOM intact, PERRLA, conjunctiva and sclera clear  ENT: Moist mucous membranes  PULMONARY: Clear to auscultation bilaterally  CARDIOVASCULAR: Regular rate and rhythm  GASTROINTESTINAL: Soft, non-tender, non-distended; bowel sounds present  MUSCULOSKELETAL: 2+ peripheral pulses; no clubbing, no cyanosis, no edema  SKIN: No rashes or lesions; warm and dry

## 2022-08-02 NOTE — PROGRESS NOTE ADULT - ASSESSMENT
Assessment and Plan:  89F w/ pmh HTN, DLD, COPD, hypothyroidism, iron deficiency anemia, bladder cancer (not on active treatment), RCC (s/p left nephrectomy - in remission), and lung cancer (s/p left lobectomy - in remission) p/w 4 days of progressively worsening abdominal pain a/w severe diarrhea. Admission labs concerning for acute transaminitis, MANA, and acute on chronic anemia.     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    # Urinary Tract Infection; no sepsis  -Recently rx'd Bactrim x5 days (current E Coli resist to bactrim)  -UA + (positive nitrite, large LE, , and moderate bacteria)  -Completed ceftriaxone x3 days  -Blood culture x1: neg  -Urine culture E Coli > 100K -> suscept to cephalo    Plan:  -d/c on vantin 100mg daily for 5 days     # Acute Kidney Injury (improved); CKD 4 w/ solitary Kidney (base Cr upper 1s to 2); s/p Lt nephrectomy  -Uosm 408; Dylan 60  -calc FeNa: need UCr, Dylan and BMP  -IVFs: d/c  -renal eval  -CT A/P: no hydro on rt; non-obst renal calc on rt; not much seen in terms of bladder cancer or mets    Plan:  - Resolved    # Acute Transaminitis - suspect drug-Induced liver Injury 2/2 bactrim  -CT A/P demonstrates intrahepatic and extrahepatic ductal dilatation, but no masses noted.  -lack of eosinophilia  -Adv GI eval for duct dil: noted - no further w/u given resolution of LFTs and lack of pain  -cancel MRCP   -CEA lvl 3.5  -RUQ U/S: no thrombosis  -acute hepatitis panel (r/o acute viral hepatitis): negative  -autoimmune markers (AMA, SMA): negative  -tylenol and alcohol levels are normal      # Acute hemolytic anemia on chronic macro anemia of CKD  -Hb 7.8 (baseline Hb 7-9 per HIE)  -keep hgb >7  -JOSE R negative  -S/P 1u pRBC   -reticulocyte count 4.7%, haptoglobin <20 and  - hemolysis most likely 2/2 bactrim use   -TSH 2.26    # Diarrhea, resolved    # Hypothyroidism  -TSH 2.26  -c/w levothyroxine 75mcg PO QD    # Hypertension  -c/w amlodipine 10mg PO QD  -c/w Lopressor 25mg PO BID  -Change hydralazine to PO 25mg BID     # Dyslipidemia  -resume statin zocor 20mg po q24 (max dose w/ max norvasc)  -check LFTs w/ in 2 wks at NH    # COPD - stable  -c/w Spiriva 1 puff QD (therapeutic interchange for Incruse Ellipta)    # Depression  -c/w duloxetine DR 60mg PO QD    # DVT PPX: heparin 5000U SQ Q12H    # GI PPX: none indicated    # ACTIVITY: independ at home; walking 30' w/ RW and contact guard - also dizzy during ambulation    # CODE STATUS: Full Code     Disp today to Short term rehab

## 2022-08-02 NOTE — PROGRESS NOTE ADULT - ASSESSMENT
89F w/ h/o HTN, DLD, COPD, hypothyroidism, iron deficiency anemia, bladder cancer (not on active treatment), RCC (s/p left nephrectomy - in remission), and lung cancer (s/p left lobectomy - in remission) p/w 4 days of progressively worsening abdominal pain a/w severe diarrhea. Admission labs concerning for acute transaminitis, MANA, and acute on chronic anemia.     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    # Urinary Tract Infection; no sepsis  Recently rx'd Bactrim x5 days (current E Coli resist to bactrim)  UA + (positive nitrite, large LE, , and moderate bacteria)  abx: completed ceftriaxone x3 days, but w/ dysuria -> start vantin 100mg po q12 for 5 days  BCx x1: neg  UCx: E Coli > 100K -> suscept to cephalo    # Acute Kidney Injury (improved); CKD 4 w/ solitary Kidney (base Cr upper 1s to 2); s/p Lt nephrectomy  Uosm 408; Dylan 60  calc FeNa: 1.72%  renal eval noted  CT A/P: no hydro on rt; non-obst renal calc on rt; not much seen in terms of bladder cancer or mets  Cr back to baseline    # Acute Transaminitis - suspect drug-Induced liver Injury 2/2 bactrim; s/p CCY; intra/extra hep duct dil - NOT new (CBD 15mm in 2019 and now 17mm)  acute transaminitis (AST/ALT 1694/802) - Liver enzymes WNL in April 2022; PT/PTT nl  CT A/P demonstrates intrahepatic and extrahepatic ductal dilatation, but no masses noted  lack of eosinophilia  Adv GI eval for duct dil: noted - no further w/u given resolution of LFTs and lack of pain  cancel MRCP   CEA lvl 3.5 (OK)  RUQ U/S: no thrombosis  acute hepatitis panel (r/o acute viral hepatitis): neg  autoimmune markers (AMA, SMA): neg  tylenol and alcohol levels are nl  OK to feed    # Acute hemolytic anemia on chronic macro anemia of CKD  Hb 7.0 (baseline Hb 7-9 per HIE)  keep hgb >7  JOSE R negative  S/P 1u pRBC   reticulocyte count 4.7%, haptoglobin <20 and  - prob had some hemolysis 2/2 bactrim; since hgb going up, will hold off on further testing  TSH 2.26  iron sat 18%,ferr 692: would STOP daily iron, not needed  B12, Fol: both nl    # Diarrhea, resolved    # Hypothyroidism  TSH 2.26  c/w levothyroxine 75mcg PO QD    # Hypertension w/ orthostatic HoTN  needs to get up slowly w/ PT  needs to be OOBTC more  give 500cc IVFs x1  c/w amlodipine 10mg PO QD  c/w Lopressor 25mg PO BID  decr hydralazine 10mg po q12    # Dyslipidemia  resume statin zocor 20mg po q24 (max dose w/ max norvasc)  check LFTs w/ in 2 wks at NH    # COPD - stable  c/w Spiriva 1 puff QD (therapeutic interchange for Incruse Ellipta)  add albuterol 1 puff po q4 prn wheeze    # Depression  c/w duloxetine DR 60mg PO QD    # DVT PPX: heparin 5000U SQ Q12H    # GI PPX: none indicated    # ACTIVITY: independ at home; walking 30' w/ RW and contact guard - also dizzy during ambulation    # CODE STATUS: Full Code     # I updated son and his wife at bedside     DISPO: orthostatic precautions; IVFs 500cc; decr hydralazine  eventually, pt will go to STR at SNF (prefer [facility]) - f/u CM - anticipate d/c jimmy

## 2022-08-02 NOTE — CHART NOTE - NSCHARTNOTEFT_GEN_A_CORE
Palliative care consult completed meeting held w team and palliative care yesterday. Pt comfortable plan for rehab. Family aware if patient declines medically they could consider hospice. Pt a+o x 3 and made her son the HCP. She defers all medical decisions to him and her dtr in law. Pt has no pain or dyspnea     Recommendations  - DNR/DNI - MOLST in chart  - D/C to rehab in SNF  - Palliative care team will sign off reconsult as needed
PALLIATIVE MEDICINE INTERDISCIPLINARY TEAM NOTE    Provider:                                         [ X ] Initial visit        Met with: [  X ] Patient  [  X ] Family  [   ] Other:    Primary Language: [ X  ] English [   ] Other*:                      *Interpretation provided by:    SUPPORT DIAGNOSES            (Check all that apply)    [   ] EOL issues  [ X  ] Advanced Illness  [   ] Cultural / spiritual concerns  [   ] Pain / suffering  [   ] Dementia / AMS  [   ] Other:  [  X ] AD issues  [   ] Grief / loss / sadness  [   ] Discharge issues  [  X ] Distress / coping    PSYCHOSOCIAL ASSESSMENT OF PATIENT         (Check all that apply)    [  X ] Initial Assessment            [   ] Reassessment          [   ] Not Applicable this visit    Pain/suffering acuity:  [ X  ] None to mild (0-3)           [   ] Moderate (4-6)        [   ] High (7-10)    Mental Status:  [ X  ] Alert/oriented (x3)          [   ] Confused/Altered(x2/x1)         [   ] Non-resp    Functional status:  [   ] Independent w ADLs      [  X ] Needs Assistance             [   ] Bedbound/Full Care    Coping:  [  X ] Coping well                     [   ] Coping w/difficulty            [   ] Poor coping    Support system:  [ X  ] Strong                              [   ] Adequate                        [   ] Inadequate      Past history and medications for:     [ ] Anxiety       [ ] Depression    [ ] Sleep disorders       SERVICE PROVIDED  [   ]Discharge support / facilitation  [   ]AD / goals of care counseling                                 [   ]EOL / death / bereavement counseling  [ X ]Counseling / support                                            [   ]Family meeting  [   ]Prayer / sacrament / ritual                                      [   ]Referral   [   ]Other                                                                       NOTE and Plan of Care (PoC):    patient is a 89 y/o F with noted pmhx, presenting with c/o abdominal pain. palliative team met with patient's son/HCP Jm and dtr-in-law Evelyn. We also met with patient. Patient AOx4. She deferred medical decision to her son. Reviewed role of palliative care with family. discussed goals with family. Jm wished to make patient DNR/DNI. He does not want her to suffer. He and his wife wished for patient to go to STR. patient also in agreement with STR. unit CM notified. all questions answered. support rendered to patient and family. will follow. [id number]

## 2022-08-02 NOTE — PROGRESS NOTE ADULT - SUBJECTIVE AND OBJECTIVE BOX
DRAKE QUIÑONES  89y  Female  ***My note supersedes ALL resident notes that I sign.  My corrections for their notes are in my note.***    I can be reached directly on [employer] [id number]. My office number is [phone]. My personal cell number is [phone].    INTERVAL EVENTS: Here for f/u of HTN. Pt was orthostatic and dizzy when she  tried walking w/ PT today. Pt still wants to try to tx high BP, but at same time cannot be too aggressive because of response to BP meds. Pt was told to sit up at edge of bed for 5 min before standing and then to stand for a minute before walking. She also should be OOBTC more. This will help her get used to a more upright position. Pt also felt that the dysuria was returning.    T(F): 98.1 (08-02-22 @ 05:04), Max: 98.1 (08-02-22 @ 05:04)  HR: 63 (08-02-22 @ 05:04) (63 - 74)  BP: 152/70 (08-02-22 @ 05:04) (152/70 - 190/84) + orthostatic w/ walking  RR: 18 (08-02-22 @ 05:04) (18 - 18)  SpO2: 93% (08-02-22 @ 08:15) (93% - 93%)    Gen: NAD  HEENT: PERRL, EOMI, mouth clr, nose clr  Neck: no nodes, no JVD, thyroid nl  lungs: clr  hrt: s1 s2 rrr no murmur  abd: soft, NT/ND, no HS megaly  ext: no edema, no c/c  neuro: aa ox3, cn intact, can move all 4 ext    LABS:                      8.0     (    96.2   7.89  )-----------( ---------      170      ( 02 Aug 2022 08:43 )             25.4    (    15.6     136   (   104   (   119      08-02-22 @ 08:43  ----------------------               5.3   (   22   (   36                             -----                        2.1  Ca  9.4   Mg  1.6    P   --     Alb 3.5  T catherine 0.5     AST 23  ALT 96  08-01-22 @ 08:56  Alb 3.2  T catherine 0.4     AST 22    07-31-22 @ 08:41  Alb 3.2  T catherine 0.4     AST 39    07-30-22 @ 07:45    Culture - Blood (collected 07-28-22 @ 14:15)  Source: .Blood None  Preliminary Report (07-29-22 @ 23:01):    No growth to date.    Culture - Urine (collected 07-27-22 @ 13:37)  Source: Clean Catch Clean Catch (Midstream)  Final Report (07-30-22 @ 19:16):    >100,000 CFU/ml Escherichia coli  Organism: Escherichia coli (07-30-22 @ 19:16)  Organism: Escherichia coli (07-30-22 @ 19:16)      -  Amikacin: S <=16      -  Amoxicillin/Clavulanic Acid: S <=8/4 Consider reserving for cystitis when ampicillin/sulbactam is resistant      -  Ampicillin: R >16 These ampicillin results predict results for amoxicillin      -  Ampicillin/Sulbactam: R >16/8 Enterobacter, Klebsiella aerogenes, Citrobacter, and Serratia may develop resistance during prolonged therapy (3-4 days)      -  Aztreonam: S <=4      -  Cefazolin: S 4 (MIC_CL_COM_ENTERIC_CEFAZU) For uncomplicated UTI with K. pneumoniae, E. coli, or P. mirablis: GEOVANNA <=16 is sensitive and GEOVANNA >=32 is resistant. This also predicts results for oral agents cefaclor, cefdinir, cefpodoxime, cefprozil, cefuroxime axetil, cephalexin and locarbef for uncomplicated UTI. Note that some isolates may be susceptible to these agents while testing resistant to cefazolin.      -  Cefepime: S <=2      -  Cefoxitin: S <=8      -  Ceftriaxone: S <=1 Enterobacter, Klebsiella aerogenes, Citrobacter, and Serratia may develop resistance during prolonged therapy      -  Ciprofloxacin: R >2      -  Ertapenem: S <=0.5      -  Gentamicin: S <=2      -  Imipenem: S <=1      -  Levofloxacin: R >4      -  Meropenem: S <=1      -  Nitrofurantoin: S <=32 Should not be used to treat pyelonephritis      -  Piperacillin/Tazobactam: S <=8      -  Tigecycline: S <=2      -  Tobramycin: S <=2      -  Trimethoprim/Sulfamethoxazole: R >2/38      Method Type: GEOVANNA    RADIOLOGY & ADDITIONAL TESTS:    MEDICATIONS:  cefpodoxime 100 milliGRAM(s) Oral daily    acetaminophen     Tablet .. 650 milliGRAM(s) Oral every 6 hours PRN  amLODIPine   Tablet 10 milliGRAM(s) Oral daily  DULoxetine 60 milliGRAM(s) Oral daily  heparin   Injectable 5000 Unit(s) SubCutaneous every 12 hours  hydrALAZINE 10 milliGRAM(s) Oral two times a day  levothyroxine 75 MICROGram(s) Oral daily  magnesium oxide 400 milliGRAM(s) Oral daily  metoprolol tartrate 25 milliGRAM(s) Oral two times a day  simvastatin 20 milliGRAM(s) Oral at bedtime  tiotropium 18 MICROgram(s) Capsule 1 Capsule(s) Inhalation daily

## 2022-08-03 VITALS
SYSTOLIC BLOOD PRESSURE: 152 MMHG | TEMPERATURE: 98 F | HEART RATE: 73 BPM | RESPIRATION RATE: 16 BRPM | DIASTOLIC BLOOD PRESSURE: 72 MMHG

## 2022-08-03 LAB
ANA PAT FLD IF-IMP: ABNORMAL
ANA TITR SER: ABNORMAL

## 2022-08-03 PROCEDURE — 99239 HOSP IP/OBS DSCHRG MGMT >30: CPT

## 2022-08-03 RX ORDER — IPRATROPIUM/ALBUTEROL SULFATE 18-103MCG
3 AEROSOL WITH ADAPTER (GRAM) INHALATION ONCE
Refills: 0 | Status: COMPLETED | OUTPATIENT
Start: 2022-08-03 | End: 2022-08-03

## 2022-08-03 RX ORDER — LIDOCAINE 4 G/100G
1 CREAM TOPICAL
Qty: 0 | Refills: 0 | DISCHARGE
Start: 2022-08-03

## 2022-08-03 RX ORDER — CEFPODOXIME PROXETIL 100 MG
1 TABLET ORAL
Qty: 0 | Refills: 0 | DISCHARGE
Start: 2022-08-03 | End: 2022-08-07

## 2022-08-03 RX ORDER — HYDRALAZINE HCL 50 MG
1 TABLET ORAL
Qty: 0 | Refills: 0 | DISCHARGE
Start: 2022-08-03

## 2022-08-03 RX ORDER — ALBUTEROL 90 UG/1
1 AEROSOL, METERED ORAL
Qty: 0 | Refills: 0 | DISCHARGE
Start: 2022-08-03

## 2022-08-03 RX ADMIN — Medication 100 MILLIGRAM(S): at 11:28

## 2022-08-03 RX ADMIN — Medication 3 MILLILITER(S): at 13:47

## 2022-08-03 RX ADMIN — Medication 10 MILLIGRAM(S): at 05:47

## 2022-08-03 RX ADMIN — DULOXETINE HYDROCHLORIDE 60 MILLIGRAM(S): 30 CAPSULE, DELAYED RELEASE ORAL at 11:28

## 2022-08-03 RX ADMIN — Medication 75 MICROGRAM(S): at 05:47

## 2022-08-03 RX ADMIN — HEPARIN SODIUM 5000 UNIT(S): 5000 INJECTION INTRAVENOUS; SUBCUTANEOUS at 05:47

## 2022-08-03 RX ADMIN — TIOTROPIUM BROMIDE 1 CAPSULE(S): 18 CAPSULE ORAL; RESPIRATORY (INHALATION) at 08:48

## 2022-08-03 RX ADMIN — MAGNESIUM OXIDE 400 MG ORAL TABLET 400 MILLIGRAM(S): 241.3 TABLET ORAL at 11:28

## 2022-08-03 RX ADMIN — Medication 25 MILLIGRAM(S): at 05:47

## 2022-08-03 RX ADMIN — AMLODIPINE BESYLATE 10 MILLIGRAM(S): 2.5 TABLET ORAL at 05:48

## 2022-08-03 NOTE — PROGRESS NOTE ADULT - ASSESSMENT
ASSESSMENT & PLAN:  89F w/ pmh HTN, DLD, COPD, hypothyroidism, iron deficiency anemia, bladder cancer (not on active treatment), RCC (s/p left nephrectomy - in remission), and lung cancer (s/p left lobectomy - in remission) p/w 4 days of progressively worsening abdominal pain a/w severe diarrhea. Admission labs concerning for acute transaminitis, MANA, and acute on chronic anemia.     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    # Urinary Tract Infection; no sepsis  -Recently rx'd Bactrim x5 days (current E Coli resist to bactrim)  -UA + (positive nitrite, large LE, , and moderate bacteria)  -Completed ceftriaxone x3 days  -Blood culture x1: neg  -Urine culture E Coli > 100K -> suscept to cephalo    Plan:  -d/c on vantin 100mg daily for 5 days     # Acute Kidney Injury (improved); CKD 4 w/ solitary Kidney (base Cr upper 1s to 2); s/p Lt nephrectomy  -Uosm 408; Dylan 60  -calc FeNa: need UCr, Dylan and BMP  -IVFs: d/c  -renal eval  -CT A/P: no hydro on rt; non-obst renal calc on rt; not much seen in terms of bladder cancer or mets    Plan:  - Resolved    # Acute Transaminitis - suspect drug-Induced liver Injury 2/2 bactrim  -CT A/P demonstrates intrahepatic and extrahepatic ductal dilatation, but no masses noted.  -lack of eosinophilia  -Adv GI eval for duct dil: noted - no further w/u given resolution of LFTs and lack of pain  -cancel MRCP   -CEA lvl 3.5  -RUQ U/S: no thrombosis  -acute hepatitis panel (r/o acute viral hepatitis): negative  -autoimmune markers (AMA, SMA): negative  -tylenol and alcohol levels are normal    Plan:  -Resolved       # Acute hemolytic anemia on chronic macro anemia of CKD  -Hb 7.8 (baseline Hb 7-9 per HIE)  -keep hgb >7  -JOSE R negative  -S/P 1u pRBC   -reticulocyte count 4.7%, haptoglobin <20 and  - hemolysis most likely 2/2 bactrim use   -TSH 2.26    Plan:  -Out patient follow up     # Diarrhea, resolved    # Hypothyroidism  -TSH 2.26    Plan:  -c/w levothyroxine 75mcg PO QD    # Hypertension  -c/w amlodipine 10mg PO QD  -c/w Lopressor 25mg PO BID  -C/w hydralazine to PO 25mg BID     # Dyslipidemia  -resume statin zocor 20mg po q24 (max dose w/ max norvasc)  -check LFTs w/ in 2 wks at NH    # COPD - stable  -c/w Spiriva 1 puff QD (therapeutic interchange for Incruse Ellipta)    # Depression  -c/w duloxetine DR 60mg PO QD    # DVT PPX: heparin 5000U SQ Q12H    # GI PPX: none indicated    # ACTIVITY: independ at home; walking 30' w/ RW and contact guard - also dizzy during ambulation    # CODE STATUS: Full Code     Was supposed to leave yesterday, however patient stood up with physical therapy and became orthostatic. Patient was given a 500 cc bolus and stayed overnight for observation.

## 2022-08-03 NOTE — PROGRESS NOTE ADULT - PROVIDER SPECIALTY LIST ADULT
Internal Medicine
Internal Medicine
Hospitalist
Internal Medicine
Internal Medicine
Nephrology
Internal Medicine
Internal Medicine
Nephrology
Nephrology
Hospitalist
Hospitalist
Internal Medicine
Internal Medicine

## 2022-08-03 NOTE — PROGRESS NOTE ADULT - SUBJECTIVE AND OBJECTIVE BOX
ISHMAEL, DRAKE  89y  Female  ***My note supersedes ALL resident notes that I sign.  My corrections for their notes are in my note.***    I can be reached directly on [email or web address]. My office number is [phone]. My personal cell number is [phone].    INTERVAL EVENTS: Here for f/u of orthostasis. Pt still w/ some trouble w/ dizziness w/ walking, but doing better today. Pt understands that it will take time to get used to being up and about and that rehab ctr will help this. She is OK w/ d/c today and her son definitely wants her to go.    T(F): 97.7 (08-03-22 @ 05:27), Max: 98.2 (08-02-22 @ 20:50)  HR: 73 (08-03-22 @ 05:27) (68 - 88)  BP: 152/72 (08-03-22 @ 05:27) (131/63 - 152/72)  RR: 16 (08-03-22 @ 05:27) (16 - 18)  SpO2: --    Gen: NAD  HEENT: PERRL, EOMI, mouth clr, nose clr  Neck: no nodes, no JVD, thyroid nl  lungs: clr  hrt: s1 s2 rrr no murmur  abd: soft, NT/ND, no HS megaly  ext: no edema, no c/c  neuro: aa ox3, cn intact, can move all 4 ext    LABS:                      8.0     (    96.2   7.89  )-----------( ---------      170      ( 02 Aug 2022 08:43 )             25.4    (    15.6     RADIOLOGY & ADDITIONAL TESTS:    MEDICATIONS:  cefpodoxime 100 milliGRAM(s) Oral daily    acetaminophen     Tablet .. 650 milliGRAM(s) Oral every 6 hours PRN  ALBUTerol    90 MICROgram(s) HFA Inhaler 1 Puff(s) Inhalation every 4 hours PRN  amLODIPine   Tablet 10 milliGRAM(s) Oral daily  DULoxetine 60 milliGRAM(s) Oral daily  heparin   Injectable 5000 Unit(s) SubCutaneous every 12 hours  hydrALAZINE 10 milliGRAM(s) Oral two times a day  levothyroxine 75 MICROGram(s) Oral daily  lidocaine   4% Patch 1 Patch Transdermal every 24 hours PRN  magnesium oxide 400 milliGRAM(s) Oral daily  metoprolol tartrate 25 milliGRAM(s) Oral two times a day  simvastatin 20 milliGRAM(s) Oral at bedtime  tiotropium 18 MICROgram(s) Capsule 1 Capsule(s) Inhalation daily

## 2022-08-03 NOTE — PROGRESS NOTE ADULT - SUBJECTIVE AND OBJECTIVE BOX
DRAKE QUIÑONES 89y Female  MRN#: [id number]   Hospital Day: 7d    SUBJECTIVE  Patient is a 89y old Female who presents with a chief complaint of Abdominal Pain (02 Aug 2022 16:04)  Currently admitted to medicine with the primary diagnosis of Acute UTI      INTERVAL HPI AND OVERNIGHT EVENTS:  Patient was examined and seen at bedside. This morning she is resting comfortably in bed and reports no issues or overnight events.    REVIEW OF SYMPTOMS:  Denies    OBJECTIVE  PAST MEDICAL & SURGICAL HISTORY  HTN (hypertension)    COPD with emphysema    Hypothyroidism    Bladder cancer  not being actively treated    Osteoarthritis    Dyslipidemia    History of hip replacement, total, right    History of total left knee replacement (TKR)    History of nephrectomy  Left    Malignant neoplasm of urinary bladder, unspecified site  Removal x 3    H/O breast surgery  25% calcification removed    History of lung surgery      ALLERGIES:  Contrast Dye (Hives; Rash)  sulfa drugs (Urticaria)    MEDICATIONS:  STANDING MEDICATIONS  amLODIPine   Tablet 10 milliGRAM(s) Oral daily  cefpodoxime 100 milliGRAM(s) Oral daily  DULoxetine 60 milliGRAM(s) Oral daily  heparin   Injectable 5000 Unit(s) SubCutaneous every 12 hours  hydrALAZINE 10 milliGRAM(s) Oral two times a day  levothyroxine 75 MICROGram(s) Oral daily  magnesium oxide 400 milliGRAM(s) Oral daily  metoprolol tartrate 25 milliGRAM(s) Oral two times a day  simvastatin 20 milliGRAM(s) Oral at bedtime  tiotropium 18 MICROgram(s) Capsule 1 Capsule(s) Inhalation daily    PRN MEDICATIONS  acetaminophen     Tablet .. 650 milliGRAM(s) Oral every 6 hours PRN  ALBUTerol    90 MICROgram(s) HFA Inhaler 1 Puff(s) Inhalation every 4 hours PRN  lidocaine   4% Patch 1 Patch Transdermal every 24 hours PRN      VITAL SIGNS: Last 24 Hours  T(C): 36.5 (03 Aug 2022 05:27), Max: 36.8 (02 Aug 2022 20:50)  T(F): 97.7 (03 Aug 2022 05:27), Max: 98.2 (02 Aug 2022 20:50)  HR: 73 (03 Aug 2022 05:27) (68 - 88)  BP: 152/72 (03 Aug 2022 05:27) (131/63 - 152/72)  BP(mean): --  RR: 16 (03 Aug 2022 05:27) (16 - 18)  SpO2: --    LABS:                        8.0    7.89  )-----------( 170      ( 02 Aug 2022 08:43 )             25.4     08-02    136  |  104  |  36<H>  ----------------------------<  119<H>  5.3<H>   |  22  |  2.1<H>    Ca    9.4      02 Aug 2022 08:43  Mg     1.6     08-02      PHYSICAL EXAM:  CONSTITUTIONAL: No acute distress, well-developed, well-groomed, AAOx3  HEAD: Atraumatic, normocephalic  EYES: EOM intact, PERRLA, conjunctiva and sclera clear  ENT: Moist mucous membranes  PULMONARY: Clear to auscultation bilaterally  CARDIOVASCULAR: Regular rate and rhythm  GASTROINTESTINAL: Soft, non-tender, non-distended  MUSCULOSKELETAL: 2+ peripheral pulses; no clubbing, no cyanosis, no edema  SKIN: No rashes or lesions; warm and dry

## 2022-08-03 NOTE — PROGRESS NOTE ADULT - ASSESSMENT
89F w/ h/o HTN, DLD, COPD, hypothyroidism, iron deficiency anemia, bladder cancer (not on active treatment), RCC (s/p left nephrectomy - in remission), and lung cancer (s/p left lobectomy - in remission) p/w 4 days of progressively worsening abdominal pain a/w severe diarrhea. Admission labs concerning for acute transaminitis, MANA, and acute on chronic anemia.     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    # Urinary Tract Infection; no sepsis  Recently rx'd Bactrim x5 days (current E Coli resist to bactrim)  UA + (positive nitrite, large LE, , and moderate bacteria)  abx: completed ceftriaxone x3 days, but w/ dysuria -> c/w vantin 100mg po q12 for 5 days (8/2-8/7)  BCx x1: neg  UCx: E Coli > 100K -> suscept to cephalo    # Acute Kidney Injury (improved); CKD 4 w/ solitary Kidney (base Cr upper 1s to 2); s/p Lt nephrectomy  Uosm 408; Dylan 60  calc FeNa: 1.72%  renal eval noted  CT A/P: no hydro on rt; non-obst renal calc on rt; not much seen in terms of bladder cancer or mets  Cr back to baseline    # Acute Transaminitis - suspect drug-Induced liver Injury 2/2 bactrim; s/p CCY; intra/extra hep duct dil - NOT new (CBD 15mm in 2019 and now 17mm)  acute transaminitis (AST/ALT 1694/802) - Liver enzymes WNL in April 2022; PT/PTT nl  CT A/P demonstrates intrahepatic and extrahepatic ductal dilatation, but no masses noted  lack of eosinophilia  Adv GI eval for duct dil: noted - no further w/u given resolution of LFTs and lack of pain  cancel MRCP   CEA lvl 3.5 (OK)  RUQ U/S: no thrombosis  acute hepatitis panel (r/o acute viral hepatitis): neg  autoimmune markers (AMA, SMA): neg  tylenol and alcohol levels are nl  OK to feed    # Acute hemolytic anemia (prob 2/2 bactrim) on chronic macro anemia of CKD  Hb 7.0 (baseline Hb 7-9 per HIE)  keep hgb >7  JOSE R negative  S/P 1u pRBC   reticulocyte count 4.7%, haptoglobin <20 and  - prob had some hemolysis 2/2 bactrim; since hgb going up, will hold off on further testing  TSH 2.26  iron sat 18%,ferr 692: would STOP daily iron, not needed  B12, Fol: both nl    # Diarrhea, resolved    # Hypothyroidism  TSH 2.26  c/w levothyroxine 75mcg PO QD    # Hypertension w/ orthostatic HoTN  needs to get up slowly w/ PT  needs to be OOBTC more  c/w amlodipine 10mg PO QD  c/w Lopressor 25mg PO BID  c/w hydralazine 10mg po q12    # Dyslipidemia  c/w statin zocor 20mg po q24 (max dose w/ max norvasc)  check LFTs w/ in 2 wks at NH    # COPD - stable  c/w Spiriva 1 puff QD (therapeutic interchange for Incruse Ellipta)  c/w albuterol 1 puff po q4 prn wheeze    # Depression  c/w duloxetine DR 60mg PO QD    # DVT PPX: heparin 5000U SQ Q12H    # GI PPX: none indicated    # ACTIVITY: independ at home; walking 30' w/ RW and contact guard - also dizzy during ambulation    # CODE STATUS: Full Code     # I updated son at bedside     DISPO: orthostatic precautions;   pt will go to [facility] at [facility] ([facility] today - f/u CM

## 2022-08-04 ENCOUNTER — APPOINTMENT (OUTPATIENT)
Dept: INFUSION THERAPY | Facility: CLINIC | Age: 87
End: 2022-08-04

## 2022-08-10 DIAGNOSIS — Z91.041 RADIOGRAPHIC DYE ALLERGY STATUS: ICD-10-CM

## 2022-08-10 DIAGNOSIS — J43.9 EMPHYSEMA, UNSPECIFIED: ICD-10-CM

## 2022-08-10 DIAGNOSIS — Z96.641 PRESENCE OF RIGHT ARTIFICIAL HIP JOINT: ICD-10-CM

## 2022-08-10 DIAGNOSIS — N39.0 URINARY TRACT INFECTION, SITE NOT SPECIFIED: ICD-10-CM

## 2022-08-10 DIAGNOSIS — E87.1 HYPO-OSMOLALITY AND HYPONATREMIA: ICD-10-CM

## 2022-08-10 DIAGNOSIS — E86.0 DEHYDRATION: ICD-10-CM

## 2022-08-10 DIAGNOSIS — I12.9 HYPERTENSIVE CHRONIC KIDNEY DISEASE WITH STAGE 1 THROUGH STAGE 4 CHRONIC KIDNEY DISEASE, OR UNSPECIFIED CHRONIC KIDNEY DISEASE: ICD-10-CM

## 2022-08-10 DIAGNOSIS — E87.2 ACIDOSIS: ICD-10-CM

## 2022-08-10 DIAGNOSIS — R10.9 UNSPECIFIED ABDOMINAL PAIN: ICD-10-CM

## 2022-08-10 DIAGNOSIS — I95.9 HYPOTENSION, UNSPECIFIED: ICD-10-CM

## 2022-08-10 DIAGNOSIS — Z20.822 CONTACT WITH AND (SUSPECTED) EXPOSURE TO COVID-19: ICD-10-CM

## 2022-08-10 DIAGNOSIS — N18.4 CHRONIC KIDNEY DISEASE, STAGE 4 (SEVERE): ICD-10-CM

## 2022-08-10 DIAGNOSIS — Z88.2 ALLERGY STATUS TO SULFONAMIDES: ICD-10-CM

## 2022-08-10 DIAGNOSIS — D64.9 ANEMIA, UNSPECIFIED: ICD-10-CM

## 2022-08-10 DIAGNOSIS — N17.9 ACUTE KIDNEY FAILURE, UNSPECIFIED: ICD-10-CM

## 2022-08-10 DIAGNOSIS — Z96.652 PRESENCE OF LEFT ARTIFICIAL KNEE JOINT: ICD-10-CM

## 2022-08-10 DIAGNOSIS — B96.20 UNSPECIFIED ESCHERICHIA COLI [E. COLI] AS THE CAUSE OF DISEASES CLASSIFIED ELSEWHERE: ICD-10-CM

## 2022-08-10 DIAGNOSIS — Z85.51 PERSONAL HISTORY OF MALIGNANT NEOPLASM OF BLADDER: ICD-10-CM

## 2022-08-11 ENCOUNTER — APPOINTMENT (OUTPATIENT)
Dept: HEMATOLOGY ONCOLOGY | Facility: CLINIC | Age: 87
End: 2022-08-11

## 2022-08-11 ENCOUNTER — LABORATORY RESULT (OUTPATIENT)
Age: 87
End: 2022-08-11

## 2022-08-11 ENCOUNTER — APPOINTMENT (OUTPATIENT)
Dept: INFUSION THERAPY | Facility: CLINIC | Age: 87
End: 2022-08-11

## 2022-08-11 VITALS
DIASTOLIC BLOOD PRESSURE: 57 MMHG | TEMPERATURE: 97.3 F | BODY MASS INDEX: 24.41 KG/M2 | SYSTOLIC BLOOD PRESSURE: 116 MMHG | HEIGHT: 64 IN | RESPIRATION RATE: 14 BRPM | WEIGHT: 143 LBS | OXYGEN SATURATION: 98 % | HEART RATE: 61 BPM

## 2022-08-11 PROBLEM — M19.90 UNSPECIFIED OSTEOARTHRITIS, UNSPECIFIED SITE: Chronic | Status: ACTIVE | Noted: 2022-07-27

## 2022-08-11 PROBLEM — C67.9 MALIGNANT NEOPLASM OF BLADDER, UNSPECIFIED: Chronic | Status: ACTIVE | Noted: 2022-07-27

## 2022-08-11 PROBLEM — E78.5 HYPERLIPIDEMIA, UNSPECIFIED: Chronic | Status: ACTIVE | Noted: 2022-07-27

## 2022-08-11 LAB
HCT VFR BLD CALC: 25.1 %
HCT VFR BLD CALC: 25.3 %
HGB BLD-MCNC: 8.1 G/DL
HGB BLD-MCNC: 8.2 G/DL
MCHC RBC-ENTMCNC: 30.9 PG
MCHC RBC-ENTMCNC: 31 PG
MCHC RBC-ENTMCNC: 32.3 G/DL
MCHC RBC-ENTMCNC: 32.4 G/DL
MCV RBC AUTO: 95.5 FL
MCV RBC AUTO: 96.2 FL
PLATELET # BLD AUTO: 248 K/UL
PLATELET # BLD AUTO: 265 K/UL
PMV BLD: 9 FL
PMV BLD: 9.2 FL
RBC # BLD: 2.61 M/UL
RBC # BLD: 2.65 M/UL
RBC # FLD: 15.4 %
RBC # FLD: 15.4 %
RETICS # AUTO: 2.2 %
RETICS AGGREG/RBC NFR: 58.3 K/UL
WBC # FLD AUTO: 6.07 K/UL
WBC # FLD AUTO: 6.27 K/UL

## 2022-08-11 PROCEDURE — 99213 OFFICE O/P EST LOW 20 MIN: CPT

## 2022-08-11 RX ORDER — ERYTHROPOIETIN 10000 [IU]/ML
20000 INJECTION, SOLUTION INTRAVENOUS; SUBCUTANEOUS ONCE
Refills: 0 | Status: COMPLETED | OUTPATIENT
Start: 2022-08-11 | End: 2022-08-11

## 2022-08-11 RX ADMIN — ERYTHROPOIETIN 20000 UNIT(S): 10000 INJECTION, SOLUTION INTRAVENOUS; SUBCUTANEOUS at 12:34

## 2022-08-12 LAB
DEPRECATED KAPPA LC FREE/LAMBDA SER: 1.61 RATIO
DIRECT COOMBS: NORMAL
IGA SER QL IEP: 439 MG/DL
IGG SER QL IEP: 1048 MG/DL
IGM SER QL IEP: 36 MG/DL
KAPPA LC CSF-MCNC: 10.07 MG/DL
KAPPA LC SERPL-MCNC: 16.23 MG/DL
LDH SERPL-CCNC: 235

## 2022-08-13 NOTE — HISTORY OF PRESENT ILLNESS
[de-identified] : \par 89 year old female with history of COPD , Aortic stenosis , AAA , left nephrectomy ( renal pelvis cancer ) , right midpole renal mass , bladder neck cancer , declined treatment. History of malignant hyperthermia , Right lung mass ( decreased on last CT scan ) referred for chronic normocytic anemia present at least since 2019. She was transfused 2 years ago and recently for symptomatic anemia with Hgb : 7.6. Review of records show baseline Hgb around 8. she denies hematochezia , she is on iron 2X /day fro 2 weeks and is well tolerated. last ferritin > 800 in 12/2021. Cr : 2.2 GFR : 20 \par System review : she complains of generalized weakness , uses a cane to ambulate , mild dyspnea on exertion , lives in assisted living and is capable of all self care ( bathing , cookin ) she denies fever ,bone pain , significant weight loss. She complains of mild suprapubic pain with urination , no hematuria. \par \par \par  \par  [de-identified] : 8/11/2022 Patient returns for chronic anemia , work up significant for elevated creatinine , low haptoglobin with reticulocytosis , Trace paraproteinemia .She was admitted recently for UTI and had adverse reaction to bactrim ( severe diarrhea) , she  is currently in rehab.

## 2022-08-13 NOTE — PHYSICAL EXAM
[Normal] : normoactive bowel sounds, soft and nontender, no hepatosplenomegaly or masses appreciated [de-identified] : pale  , well preserved elderly female , conversant , oriented 4X

## 2022-08-13 NOTE — ASSESSMENT
[FreeTextEntry1] : 89 year old female with multiple medical problems , chronic normocytic anemia , baseline Hgb around 8 , required transfusion recently for symptomatic anemia . at least partly due to CKD , chronic disease. NO definite evidence of bleeding . \par low haptoglobin , reticulocytosis , rule out hemolysis ( valve related ? ) , trace paraprotein. low EPO . \par History of renal cancer, inoperable bladder cancer ? Right lung mass . \par Aortic stenosis , AAA .\par Plan : \par          coomb's direct , LDH , myeloma panel . smear. \par          start EPO 20,000 every 2 weeks . \par          folic acid 1 mg daily .\par          follow up in 4 weeks .

## 2022-08-18 NOTE — PROGRESS NOTE ADULT - SUBJECTIVE AND OBJECTIVE BOX
Patient is a 89y old  Female who presents with a chief complaint of worsening diarrhea and abdominal pain for 2 days duration (23 Jul 2019 06:44)    Patient was seen and examined.  Denies diarrhea today  Doing better  Denies any other complaints.  All systems reviewed positive history as mentioned above.    PAST MEDICAL & SURGICAL HISTORY:  COPD with emphysema  Colitis: with diarrhea  Other type of osteoarthritis  HTN (hypertension)  Cancer: Bladder 1981  COPD (chronic obstructive pulmonary disease)  H/O breast surgery: 25% calcification removed  Malignant neoplasm of urinary bladder, unspecified site: Removal x 3  History of nephrectomy: Left  History of total left knee replacement (TKR)  History of hip replacement, total, right    Allergies  Contrast Dye (Hives; Rash)  No Known Drug Allergies    MEDICATIONS  (STANDING):  ALBUTerol/ipratropium for Nebulization 3 milliLiter(s) Nebulizer every 6 hours  amLODIPine   Tablet 2.5 milliGRAM(s) Oral <User Schedule>  chlorhexidine 4% Liquid 1 Application(s) Topical <User Schedule>  DULoxetine 60 milliGRAM(s) Oral daily  heparin  Injectable 5000 Unit(s) SubCutaneous every 8 hours  levothyroxine 75 MICROGram(s) Oral daily  metoprolol tartrate 25 milliGRAM(s) Oral two times a day  pantoprazole    Tablet 40 milliGRAM(s) Oral before breakfast  vancomycin    Solution 125 milliGRAM(s) Oral every 6 hours    MEDICATIONS  (PRN):    Vital Signs Last 24 Hrs  T(C): 36.2  T(F): 97.2  HR: 65  BP: 109/64  BP(mean): --  RR: 18  SpO2: 96%    O/E:  Awake, alert, not in distress.  HEENT: atraumatic, EOMI.  Chest: clear.  CVS: SIS2 +, no murmur.  P/A: Soft, BS+  CNS: non focal.  Ext: no edema feet.  Skin: no rash, no ulcers.  All systems reviewed positive findings as above.    POCT Blood Glucose.: 109 mg/dL (22 Jul 2019 16:17)                        7.4<L>  5.37  )-----------( 165      ( 23 Jul 2019 08:09 )             23.2<L>                        6.9<LL>  7.21  )-----------( 168      ( 22 Jul 2019 21:39 )             21.8<L>    07-23    137  |  110  |  36<H>  ----------------------------<  109<H>  5.2<H>   |  19  |  2.1<H>  07-22    138  |  109  |  45<H>  ----------------------------<  100<H>  5.3<H>   |  18  |  2.2<H>    Ca    9.1      23 Jul 2019 08:09  Ca    9.2      22 Jul 2019 08:18  Ca    9.0      22 Jul 2019 00:47  Ca    9.0      21 Jul 2019 16:35 no nausea/no vomiting/no diarrhea/no constipation/no change in bowel habits

## 2022-08-25 ENCOUNTER — APPOINTMENT (OUTPATIENT)
Dept: INFUSION THERAPY | Facility: CLINIC | Age: 87
End: 2022-08-25

## 2022-08-25 ENCOUNTER — LABORATORY RESULT (OUTPATIENT)
Age: 87
End: 2022-08-25

## 2022-08-25 LAB
HCT VFR BLD CALC: 24.5 %
HGB BLD-MCNC: 8.1 G/DL
MCHC RBC-ENTMCNC: 30.5 PG
MCHC RBC-ENTMCNC: 33.1 G/DL
MCV RBC AUTO: 92.1 FL
PLATELET # BLD AUTO: 144 K/UL
PMV BLD: 9.7 FL
RBC # BLD: 2.66 M/UL
RBC # FLD: 17.2 %
WBC # FLD AUTO: 7.79 K/UL

## 2022-09-08 ENCOUNTER — APPOINTMENT (OUTPATIENT)
Dept: INFUSION THERAPY | Facility: CLINIC | Age: 87
End: 2022-09-08

## 2022-09-08 ENCOUNTER — APPOINTMENT (OUTPATIENT)
Dept: HEMATOLOGY ONCOLOGY | Facility: CLINIC | Age: 87
End: 2022-09-08

## 2022-09-08 VITALS
WEIGHT: 137 LBS | BODY MASS INDEX: 23.39 KG/M2 | DIASTOLIC BLOOD PRESSURE: 58 MMHG | OXYGEN SATURATION: 96 % | TEMPERATURE: 97.6 F | SYSTOLIC BLOOD PRESSURE: 127 MMHG | RESPIRATION RATE: 14 BRPM | HEART RATE: 54 BPM | HEIGHT: 64 IN

## 2022-09-08 LAB
BASOPHILS # BLD AUTO: 0.05 K/UL
BASOPHILS NFR BLD AUTO: 0.6 %
EOSINOPHIL # BLD AUTO: 0.14 K/UL
EOSINOPHIL NFR BLD AUTO: 1.8 %
HCT VFR BLD CALC: 24.1 %
HGB BLD-MCNC: 7.8 G/DL
IMM GRANULOCYTES NFR BLD AUTO: 0.8 %
LYMPHOCYTES # BLD AUTO: 1.62 K/UL
LYMPHOCYTES NFR BLD AUTO: 20.9 %
MAN DIFF?: NORMAL
MCHC RBC-ENTMCNC: 30.1 PG
MCHC RBC-ENTMCNC: 32.4 G/DL
MCV RBC AUTO: 93.1 FL
MONOCYTES # BLD AUTO: 0.6 K/UL
MONOCYTES NFR BLD AUTO: 7.7 %
NEUTROPHILS # BLD AUTO: 5.29 K/UL
NEUTROPHILS NFR BLD AUTO: 68.2 %
PLATELET # BLD AUTO: 171 K/UL
RBC # BLD: 2.59 M/UL
RBC # FLD: 17.6 %
WBC # FLD AUTO: 7.76 K/UL

## 2022-09-08 PROCEDURE — 99213 OFFICE O/P EST LOW 20 MIN: CPT

## 2022-09-08 RX ORDER — ERYTHROPOIETIN 10000 [IU]/ML
30000 INJECTION, SOLUTION INTRAVENOUS; SUBCUTANEOUS ONCE
Refills: 0 | Status: COMPLETED | OUTPATIENT
Start: 2022-09-08 | End: 2022-09-08

## 2022-09-08 RX ADMIN — ERYTHROPOIETIN 30000 UNIT(S): 10000 INJECTION, SOLUTION INTRAVENOUS; SUBCUTANEOUS at 12:11

## 2022-09-12 NOTE — ASSESSMENT
[FreeTextEntry1] : 89 year old female with multiple medical problems , chronic normocytic anemia , baseline Hgb around 8 , required transfusion recently for symptomatic anemia . at least partly due to CKD , chronic disease. NO definite evidence of bleeding . \par low haptoglobin , reticulocytosis , rule out hemolysis ( valve related ? ) , trace paraprotein. low EPO . \par negative coomb's and myeloma panel . \par \par History of renal cancer, inoperable bladder cancer ? Right lung mass . \par Aortic stenosis , AAA .\par \par Plan : \par          change  EPO to  30,000 every 2 weeks . \par          folic acid 1 mg daily .\par          cbc in one week , consider transfusion .

## 2022-09-12 NOTE — PHYSICAL EXAM
[Normal] : no JVD, no calf tenderness, venous stasis changes, varices [de-identified] : pale  , well preserved elderly female , conversant , oriented 4X

## 2022-09-12 NOTE — HISTORY OF PRESENT ILLNESS
[de-identified] : 89 year old female with history of COPD , Aortic stenosis , AAA , left nephrectomy ( renal pelvis cancer ) , right midpole renal mass ,  bladder neck cancer , declined treatment . History of malignant hyperthermia , Right lung mass ( decreased on last CT scan ) referred for chronic normocytic anemia present at least since 2019 . She was transfused 2 years ago and recently for symptomatic anemia with Hgb : 7.6 . Review of records show baseline Hgb around  8 . she denies hematochezia , she is on iron 2X /day fro 2 weeks and is well tolerated . last ferritin > 800 in 12/2021 . Cr : 2.2 GFR : 20 \par System review : she complains of generalized weakness , uses a cane to ambulate , mild  dyspnea on exertion , lives in assisted living and is capable of all self care ( bathing , cookin ) she denies fever ,bone pain , significant weight loss. She complains of mild suprapubic pain with urination , no hematuria .  [de-identified] : 8/11/2022 Patient returns for chronic anemia , work up significant for elevated creatinine , low haptoglobin with reticulocytosis , Trace paraproteinemia .She was admitted recently for UTI and had adverse reaction to bactrim ( severe diarrhea) , she  is currently in rehab. \par \par 9/8/2022 patient returns for follow up , she was discharged from rehab , she received retacrit every 2 weeks X 2 , Hgb dropped slightly to 7.8 , she continues to complain of fatigue .

## 2022-09-14 ENCOUNTER — APPOINTMENT (OUTPATIENT)
Dept: HEMATOLOGY ONCOLOGY | Facility: CLINIC | Age: 87
End: 2022-09-14

## 2022-09-14 DIAGNOSIS — D64.9 ANEMIA, UNSPECIFIED: ICD-10-CM

## 2022-09-14 LAB
ABO + RH PNL BLD: NORMAL
BASOPHILS # BLD AUTO: 0.02 K/UL
BASOPHILS NFR BLD AUTO: 0.3 %
BLD GP AB SCN SERPL QL: NORMAL
EOSINOPHIL # BLD AUTO: 0.14 K/UL
EOSINOPHIL NFR BLD AUTO: 1.8 %
HCT VFR BLD CALC: 25 %
HGB BLD-MCNC: 7.9 G/DL
IMM GRANULOCYTES NFR BLD AUTO: 1.2 %
LYMPHOCYTES # BLD AUTO: 1.15 K/UL
LYMPHOCYTES NFR BLD AUTO: 15.1 %
MAN DIFF?: NORMAL
MCHC RBC-ENTMCNC: 30.5 PG
MCHC RBC-ENTMCNC: 31.6 G/DL
MCV RBC AUTO: 96.5 FL
MONOCYTES # BLD AUTO: 0.76 K/UL
MONOCYTES NFR BLD AUTO: 10 %
NEUTROPHILS # BLD AUTO: 5.44 K/UL
NEUTROPHILS NFR BLD AUTO: 71.6 %
PLATELET # BLD AUTO: 152 K/UL
RBC # BLD: 2.59 M/UL
RBC # FLD: 20 %
WBC # FLD AUTO: 7.6 K/UL

## 2022-09-16 ENCOUNTER — OUTPATIENT (OUTPATIENT)
Dept: OUTPATIENT SERVICES | Facility: HOSPITAL | Age: 87
LOS: 1 days | Discharge: HOME | End: 2022-09-16

## 2022-09-16 ENCOUNTER — APPOINTMENT (OUTPATIENT)
Dept: INFUSION THERAPY | Facility: CLINIC | Age: 87
End: 2022-09-16

## 2022-09-16 DIAGNOSIS — Z98.890 OTHER SPECIFIED POSTPROCEDURAL STATES: Chronic | ICD-10-CM

## 2022-09-16 DIAGNOSIS — Z96.641 PRESENCE OF RIGHT ARTIFICIAL HIP JOINT: Chronic | ICD-10-CM

## 2022-09-16 DIAGNOSIS — D64.9 ANEMIA, UNSPECIFIED: ICD-10-CM

## 2022-09-16 DIAGNOSIS — C67.9 MALIGNANT NEOPLASM OF BLADDER, UNSPECIFIED: Chronic | ICD-10-CM

## 2022-09-16 DIAGNOSIS — Z90.5 ACQUIRED ABSENCE OF KIDNEY: Chronic | ICD-10-CM

## 2022-09-16 DIAGNOSIS — Z96.652 PRESENCE OF LEFT ARTIFICIAL KNEE JOINT: Chronic | ICD-10-CM

## 2022-09-22 ENCOUNTER — APPOINTMENT (OUTPATIENT)
Dept: INFUSION THERAPY | Facility: CLINIC | Age: 87
End: 2022-09-22

## 2022-10-05 NOTE — PHYSICAL THERAPY INITIAL EVALUATION ADULT - MARITAL STATUS
Single [Left] : left shoulder [4 ___] : forward flexion 4[unfilled]/5 [5___] : external rotation 5[unfilled]/5 [] : no deformity [FreeTextEntry9] : FE: L 160A\par ER: L 40\par IR: L L1

## 2022-10-06 ENCOUNTER — APPOINTMENT (OUTPATIENT)
Dept: INFUSION THERAPY | Facility: CLINIC | Age: 87
End: 2022-10-06

## 2022-10-11 NOTE — H&P ADULT - DOES THIS PATIENT HAVE A HISTORY OF OR HAS BEEN DX WITH HEART FAILURE?
Pt not having any urine output tonight. No urine output documented since Rob was taken out yesterday. Pt states no urge to urinate. 531mLs in bladder per bladder scanner but multiple scans showed varying results. Sarmad Zhu MD notified. Per MD, [id number] to straight cath pt. 300mLs removed from pt's bladder upon straight cath before pt demanded removal of cath. Decreased urine flow was noted from bladder before removal of straight cath. no

## 2022-10-20 ENCOUNTER — APPOINTMENT (OUTPATIENT)
Dept: INFUSION THERAPY | Facility: CLINIC | Age: 87
End: 2022-10-20

## 2022-10-21 DIAGNOSIS — N17.9 ACUTE KIDNEY FAILURE, UNSPECIFIED: ICD-10-CM

## 2022-10-21 DIAGNOSIS — N18.4 CHRONIC KIDNEY DISEASE, STAGE 4 (SEVERE): ICD-10-CM

## 2022-10-21 DIAGNOSIS — Z85.51 PERSONAL HISTORY OF MALIGNANT NEOPLASM OF BLADDER: ICD-10-CM

## 2022-11-03 ENCOUNTER — APPOINTMENT (OUTPATIENT)
Dept: INFUSION THERAPY | Facility: CLINIC | Age: 87
End: 2022-11-03

## 2022-11-03 ENCOUNTER — APPOINTMENT (OUTPATIENT)
Dept: HEMATOLOGY ONCOLOGY | Facility: CLINIC | Age: 87
End: 2022-11-03

## 2023-03-28 NOTE — PROGRESS NOTE ADULT - SUBJECTIVE AND OBJECTIVE BOX
"Katie Griffiths is a 62 year old female that presents in clinic today for the following:     Chief Complaint   Patient presents with     Respiratory Problems     Pt is stating that they feel like something is in their lungs; Pt felt like something was tight  (over one month) and they began to have a cough around 2 weeks ago (03/14), Pt used inhaler, but states that it still feels like \"something is there.\"       The patient's allergies and medications were reviewed. The patient's vitals were obtained without incident. The patient does not have any other questions for the provider.     Carmen De Leon, EMT at 7:44 AM on 3/28/2023.  Primary Care Clinic: [phone]  "   DRAKE QUIÑONES  89y  Female      Patient is a 89y old  Female who presents with a chief complaint of Abdominal Pain (30 Jul 2022 11:41)      INTERVAL HPI/OVERNIGHT EVENTS:  Still with episode of dark stool, no fever, abdominal pain improved.   Vital Signs Last 24 Hrs  T(C): 36.6 (30 Jul 2022 14:53), Max: 36.6 (30 Jul 2022 14:53)  T(F): 97.8 (30 Jul 2022 14:53), Max: 97.8 (30 Jul 2022 14:53)  HR: 59 (30 Jul 2022 14:53) (58 - 86)  BP: 151/67 (30 Jul 2022 14:53) (106/59 - 199/83)  BP(mean): --  RR: 18 (30 Jul 2022 14:53) (18 - 18)  SpO2: 100% (30 Jul 2022 08:18) (95% - 100%)    Parameters below as of 30 Jul 2022 08:18  Patient On (Oxygen Delivery Method): room air          07-29-22 @ 07:01  -  07-30-22 @ 07:00  --------------------------------------------------------  IN: 0 mL / OUT: 1203 mL / NET: -1203 mL            Consultant(s) Notes Reviewed:  [x ] YES  [ ] NO          MEDICATIONS  (STANDING):  amLODIPine   Tablet 5 milliGRAM(s) Oral daily  DULoxetine 60 milliGRAM(s) Oral daily  heparin   Injectable 5000 Unit(s) SubCutaneous every 12 hours  lactated ringers. 1000 milliLiter(s) (100 mL/Hr) IV Continuous <Continuous>  levothyroxine 75 MICROGram(s) Oral daily  metoprolol tartrate 25 milliGRAM(s) Oral two times a day  sodium bicarbonate 1300 milliGRAM(s) Oral two times a day  tiotropium 18 MICROgram(s) Capsule 1 Capsule(s) Inhalation daily    MEDICATIONS  (PRN):      LABS                          7.1    6.01  )-----------( 130      ( 30 Jul 2022 07:45 )             21.8     07-30    136  |  108  |  45<H>  ----------------------------<  114<H>  4.6   |  18  |  2.6<H>    Ca    9.2      30 Jul 2022 07:45  Mg     1.6     07-30    TPro  5.4<L>  /  Alb  3.2<L>  /  TBili  0.4  /  DBili  x   /  AST  39  /  ALT  189<H>  /  AlkPhos  145<H>  07-30          Lactate Trend  07-27 @ 07:54 Lactate:1.4         CAPILLARY BLOOD GLUCOSE          Culture - Blood (collected 07-28-22 @ 14:15)  Source: .Blood None  Preliminary Report (07-29-22 @ 23:01):    No growth to date.    Culture - Urine (collected 07-27-22 @ 13:37)  Source: Clean Catch Clean Catch (Midstream)  Preliminary Report (07-29-22 @ 12:23):    >100,000 CFU/ml Escherichia coli        RADIOLOGY & ADDITIONAL TESTS:    Imaging Personally Reviewed:  [ ] YES  [ ] NO    HEALTH ISSUES - PROBLEM Dx:          PHYSICAL EXAM:  GENERAL: NAD, well-developed.  HEAD:  Atraumatic, Normocephalic.  EYES: EOMI, PERRLA, conjunctiva and sclera clear.  NECK: Supple, No JVD.  CHEST/LUNG: Clear to auscultation bilaterally; No wheeze.  HEART: Regular rate and rhythm; S1 S2.   ABDOMEN: Soft, Nontender, Nondistended; Bowel sounds present.  EXTREMITIES:  2+ Peripheral Pulses, No clubbing, cyanosis, or edema.  PSYCH: AAOx3.  NEUROLOGY: non-focal.  SKIN: No rashes or lesions.

## 2023-06-04 NOTE — ED ADULT NURSE NOTE - PAIN: PRESENCE, MLM
Medication history complete. Medications and allergies reviewed with patient and confirmed with .  denies pain/discomfort

## 2024-06-13 NOTE — PROGRESS NOTE ADULT - SUBJECTIVE AND OBJECTIVE BOX
Balloon deflated and removed at this time. Pt tolerating procedure well.  SUBJECTIVE:    Patient is a 89y old Female who presents with a chief complaint of Abdominal Pain (2022 08:58)    Currently admitted to medicine with the primary diagnosis of MANA (acute kidney injury)       Today is hospital day 2d.     PAST MEDICAL & SURGICAL HISTORY  HTN (hypertension)    COPD with emphysema    Hypothyroidism    Bladder cancer  not being actively treated    Osteoarthritis    Dyslipidemia    History of hip replacement, total, right    History of total left knee replacement (TKR)    History of nephrectomy  Left    Malignant neoplasm of urinary bladder, unspecified site  Removal x 3    H/O breast surgery  25% calcification removed    History of lung surgery      ALLERGIES:  Contrast Dye (Hives; Rash)  sulfa drugs (Urticaria)    MEDICATIONS:  STANDING MEDICATIONS  amLODIPine   Tablet 5 milliGRAM(s) Oral daily  cefTRIAXone   IVPB 1000 milliGRAM(s) IV Intermittent every 24 hours  DULoxetine 60 milliGRAM(s) Oral daily  heparin   Injectable 5000 Unit(s) SubCutaneous every 12 hours  levothyroxine 75 MICROGram(s) Oral daily  metoprolol tartrate 25 milliGRAM(s) Oral two times a day  sodium chloride 0.9%. 1000 milliLiter(s) IV Continuous <Continuous>  tiotropium 18 MICROgram(s) Capsule 1 Capsule(s) Inhalation daily    PRN MEDICATIONS    VITALS:   T(F): 96.8  HR: 65  BP: 190/81  RR: 18  SpO2: 99%    LABS:                        8.0    7.14  )-----------( 104      ( 2022 09:34 )             25.2     07-28    137  |  110  |  53<H>  ----------------------------<  141<H>  4.7   |  16<L>  |  3.3<H>    Ca    9.0      2022 09:34  Phos  3.6     07-28  Mg     1.9     -    TPro  5.8<L>  /  Alb  3.5  /  TBili  0.8  /  DBili  x   /  AST  354<H>  /  ALT  502<H>  /  AlkPhos  204<H>  07-28    PT/INR - ( 2022 09:34 )   PT: 12.30 sec;   INR: 1.07 ratio         PTT - ( 2022 09:34 )  PTT:33.9 sec  Urinalysis Basic - ( 2022 13:25 )    Color: Orange / Appearance: Clear / S.025 / pH: x  Gluc: x / Ketone: Negative  / Bili: Large / Urobili: <2 mg/dL   Blood: x / Protein: 300 mg/dL / Nitrite: Positive   Leuk Esterase: Large / RBC: 32 /HPF /  /HPF   Sq Epi: x / Non Sq Epi: 8 /HPF / Bacteria: Moderate            Culture - Urine (collected 2022 13:37)  Source: Clean Catch Clean Catch (Midstream)  Preliminary Report (2022 12:23):    >100,000 CFU/ml Escherichia coli          RADIOLOGY:    PHYSICAL EXAM:  GEN: No acute distress  LUNGS: Clear to auscultation bilaterally   HEART: S1/S2 present. RRR.   ABD/ GI: Soft, non-tender, non-distended. Bowel sounds present  EXT: NC/NC/NE/2+PP/ARORA  NEURO: AAOX3

## 2025-01-16 NOTE — H&P PST ADULT - SKIN
comprehension, sequencing, etc.) x10 Has previously met given cues and feedback, however, continues to be inconsistent based on attention to task and difficulty of story   [x]Met  []Partially met  [] Not met            Short-term Goal(s): Current Progress   Goal 1: Patient will answer social/pragmatic questions x10   []Met  []Partially met  [x]Not met   Goal 2: Patient will answer why/how questions x10 []Met  []Partially met  [x]Not met   Goal 3: Patient will follow 2 step directions containing instructional-level vocabulary x10 []Met  []Partially met  [x]Not met   Goal 4: Patient will participate in recall tasks following short story (i.e. comprehension, sequencing, etc.) x10 []Met  []Partially met  [x] Not met        MET  Long-term Goal(s): Current Progress   Goal 1: Patient will IND produce 4-5 word grammatical sentence x10   [x]Met  []Partially met  []Not met   Goal 2: Patient will participate in topic-driving conversation exchange across x8 turns [x]Met  []Partially met  [] Not met       Time Frame for Long Term Goals: 6 months by 1/4/2024    NEW  Long-term Goal(s): Current Progress   Goal 1: Patient will IND produce 4-5 word grammatical sentence x10   []Met  []Partially met  [x]Not met   Goal 2: Patient will participate in topic-driving conversation exchange across x8 turns []Met  []Partially met  [x] Not met     Rehab Potential  [] Excellent  [] Good   [x] Fair   [] Poor    Plan: Based on severity of deficits and rehab potential, this pt is likely to require therapy services lasting greater than one year. Same aged peers are able to consisently produce sentences of 5+ words with 100% intelligibility across listeners and contexts. They are able to decipher and interpret implicit and implied social skills. They are able to make predictions based on facts. They are able to decipher stories and answer a variety of questions regarding information presented.       Electronically signed by:    Demar Rice M.S.,  No lesions; no rash

## 2025-07-29 NOTE — PATIENT PROFILE ADULT - NSTRANSFERBELONGINGSDISPO_GEN_A_NUR
Grisel Martinez, APRN - CNP sent to [facility] Clinical Staff  Urine is showing bacteria and nitrites. Will send macrobid to pharmacy  Will address other labs at upcoming appt. Could we call lab and get an A1C added on? TS    Attempted to call patient. Got voicemail, left message to call the office. Lab couldn't add on A1C  
TS went over results at appointment  
Will discuss results at today's appointment.  
with patient